# Patient Record
Sex: FEMALE | Race: WHITE | NOT HISPANIC OR LATINO | Employment: OTHER | URBAN - METROPOLITAN AREA
[De-identification: names, ages, dates, MRNs, and addresses within clinical notes are randomized per-mention and may not be internally consistent; named-entity substitution may affect disease eponyms.]

---

## 2017-08-20 RX ORDER — GABAPENTIN 300 MG/1
CAPSULE ORAL
Qty: 270 CAPSULE | Refills: 0 | Status: SHIPPED | OUTPATIENT
Start: 2017-08-20 | End: 2017-11-07

## 2017-08-20 RX ORDER — POTASSIUM CHLORIDE 600 MG/1
CAPSULE, EXTENDED RELEASE ORAL
Qty: 90 CAPSULE | Refills: 0 | Status: SHIPPED | OUTPATIENT
Start: 2017-08-20 | End: 2017-11-18 | Stop reason: SDUPTHER

## 2017-08-23 RX ORDER — CYCLOBENZAPRINE HCL 10 MG
TABLET ORAL
Qty: 30 TABLET | Refills: 1 | Status: SHIPPED | OUTPATIENT
Start: 2017-08-23 | End: 2018-12-17 | Stop reason: ALTCHOICE

## 2017-08-29 RX ORDER — MONTELUKAST SODIUM 10 MG/1
TABLET ORAL
Qty: 90 TABLET | Refills: 1 | Status: SHIPPED | OUTPATIENT
Start: 2017-08-29 | End: 2017-11-07

## 2017-08-29 RX ORDER — FUROSEMIDE 20 MG/1
TABLET ORAL
Qty: 90 TABLET | Refills: 1 | Status: SHIPPED | OUTPATIENT
Start: 2017-08-29 | End: 2018-02-25 | Stop reason: SDUPTHER

## 2017-08-29 RX ORDER — AMLODIPINE AND BENAZEPRIL HYDROCHLORIDE 5; 20 MG/1; MG/1
CAPSULE ORAL
Qty: 90 CAPSULE | Refills: 1 | Status: SHIPPED | OUTPATIENT
Start: 2017-08-29 | End: 2017-11-07 | Stop reason: CLARIF

## 2017-08-31 ENCOUNTER — TELEPHONE (OUTPATIENT)
Dept: PRIMARY CARE CLINIC | Facility: CLINIC | Age: 67
End: 2017-08-31

## 2017-08-31 ENCOUNTER — NURSE TRIAGE (OUTPATIENT)
Dept: ADMINISTRATIVE | Facility: CLINIC | Age: 67
End: 2017-08-31

## 2017-08-31 NOTE — TELEPHONE ENCOUNTER
Reason for Disposition   Caller has already spoken to PCP or another triager   Caller has already spoken with the PCP and has no further questions.    Protocols used: ST INFORMATION ONLY CALL-A-AH, ST NO CONTACT OR DUPLICATE CONTACT CALL-A-AH    Pt states an hour ago she almost passed out, feels dizzy, headache and funny filling to one side of face. States she already spoke to PCP office who told her to go to ER. Pt en route to ER.

## 2017-08-31 NOTE — TELEPHONE ENCOUNTER
----- Message from Karina Lyons sent at 8/31/2017 10:47 AM CDT -----  Mother stated patient is experiencing strange feeling on right side of face and head/almost passed out/gave call to screening nurse (Jackie)

## 2017-09-12 ENCOUNTER — OFFICE VISIT (OUTPATIENT)
Dept: PRIMARY CARE CLINIC | Facility: CLINIC | Age: 67
End: 2017-09-12
Payer: MEDICARE

## 2017-09-12 VITALS
OXYGEN SATURATION: 98 % | DIASTOLIC BLOOD PRESSURE: 67 MMHG | HEART RATE: 69 BPM | SYSTOLIC BLOOD PRESSURE: 97 MMHG | HEIGHT: 71 IN | RESPIRATION RATE: 18 BRPM | BODY MASS INDEX: 34.44 KG/M2 | WEIGHT: 246 LBS | TEMPERATURE: 98 F

## 2017-09-12 DIAGNOSIS — G45.9 TRANSIENT CEREBRAL ISCHEMIA, UNSPECIFIED TYPE: Primary | ICD-10-CM

## 2017-09-12 DIAGNOSIS — I65.29 STENOSIS OF CAROTID ARTERY, UNSPECIFIED LATERALITY: ICD-10-CM

## 2017-09-12 DIAGNOSIS — I10 ESSENTIAL HYPERTENSION, BENIGN: ICD-10-CM

## 2017-09-12 DIAGNOSIS — E78.5 HYPERLIPIDEMIA, UNSPECIFIED HYPERLIPIDEMIA TYPE: ICD-10-CM

## 2017-09-12 DIAGNOSIS — Z23 NEED FOR VACCINATION: ICD-10-CM

## 2017-09-12 PROCEDURE — 99214 OFFICE O/P EST MOD 30 MIN: CPT | Mod: S$GLB,,, | Performed by: FAMILY MEDICINE

## 2017-09-12 RX ORDER — ATORVASTATIN CALCIUM 20 MG/1
1 TABLET, FILM COATED ORAL DAILY
COMMUNITY
Start: 2017-08-14

## 2017-09-12 RX ORDER — PROMETHAZINE HYDROCHLORIDE 25 MG/1
25 TABLET ORAL EVERY 6 HOURS PRN
COMMUNITY
End: 2017-11-07

## 2017-09-12 RX ORDER — TRAZODONE HYDROCHLORIDE 100 MG/1
1 TABLET ORAL NIGHTLY
COMMUNITY
Start: 2017-06-11 | End: 2017-10-02 | Stop reason: SDUPTHER

## 2017-09-12 RX ORDER — LORATADINE 10 MG/1
1 TABLET ORAL DAILY PRN
COMMUNITY
Start: 2017-07-26 | End: 2017-11-07

## 2017-09-12 RX ORDER — SERTRALINE HYDROCHLORIDE 100 MG/1
1 TABLET, FILM COATED ORAL DAILY
COMMUNITY
Start: 2017-07-09 | End: 2018-04-05 | Stop reason: SDUPTHER

## 2017-09-12 RX ORDER — ALBUTEROL SULFATE 90 UG/1
1 AEROSOL, METERED RESPIRATORY (INHALATION) 2 TIMES DAILY
COMMUNITY
Start: 2017-07-17 | End: 2018-09-26

## 2017-09-12 RX ORDER — ASPIRIN 81 MG/1
81 TABLET ORAL DAILY
COMMUNITY
End: 2017-11-07

## 2017-09-12 RX ORDER — PANTOPRAZOLE SODIUM 40 MG/1
1 TABLET, DELAYED RELEASE ORAL DAILY
COMMUNITY
Start: 2017-07-20 | End: 2017-11-07

## 2017-09-12 NOTE — PROGRESS NOTES
Subjective:       Patient ID: Sveta العراقي is a 67 y.o. female.    Chief Complaint: Follow-up (pt is here for hospital f/u- pt was told from the ER she had a mini stroke but when admitted she was told something different )    Admitted to [facility] ~2 weeks ago with sudden onset right sided HA and facial numbness. Symptoms resolved within 24h. No acute abnormalities on CT or MRI. CTA of head and neck showed 40% carotid stenosis on right. Only med adjustment was addition of low-dose ASA. Since discharge, pt has been feeling fine. No recurrent symptoms.      Review of Systems   Constitutional: Negative for fever.   Eyes: Negative for visual disturbance.   Respiratory: Negative for shortness of breath.    Cardiovascular: Negative for chest pain.   Gastrointestinal: Negative for nausea and vomiting.   Genitourinary: Negative for difficulty urinating.   Neurological: Negative for dizziness, facial asymmetry, speech difficulty, weakness and light-headedness.       Objective:       Vitals:    09/12/17 1448   BP: 97/67   Pulse: 69   Resp: 18   Temp: 98.3 °F (36.8 °C)       Physical Exam   Constitutional: She is oriented to person, place, and time. She appears well-developed and well-nourished.   HENT:   Head: Normocephalic and atraumatic.   Cardiovascular: Normal rate, regular rhythm and normal heart sounds.    Pulmonary/Chest: Effort normal and breath sounds normal.   Musculoskeletal: She exhibits no edema.   Neurological: She is alert and oriented to person, place, and time. No cranial nerve deficit.   Skin: Skin is warm and dry.   Vitals reviewed.      Assessment:       1. Transient cerebral ischemia, unspecified type    2. Stenosis of carotid artery, unspecified laterality    3. Essential hypertension, benign    4. Hyperlipidemia, unspecified hyperlipidemia type    5. Need for vaccination        Plan:       Transient cerebral ischemia, unspecified type  Comments:  continue ASA, statin, BP meds    Stenosis of carotid  artery, unspecified laterality  -     CBC auto differential; Future; Expected date: 12/12/2017    Essential hypertension, benign  -     CBC auto differential; Future; Expected date: 12/12/2017    Hyperlipidemia, unspecified hyperlipidemia type  -     Comprehensive metabolic panel; Future; Expected date: 12/12/2017  -     Lipid panel; Future; Expected date: 12/12/2017    Need for vaccination  Comments:  pt declined flu shot

## 2017-10-02 RX ORDER — TRAZODONE HYDROCHLORIDE 100 MG/1
TABLET ORAL
Qty: 90 TABLET | Refills: 1 | Status: SHIPPED | OUTPATIENT
Start: 2017-10-02 | End: 2018-05-30 | Stop reason: SDUPTHER

## 2017-11-08 PROBLEM — I48.0 PAROXYSMAL ATRIAL FIBRILLATION: Status: ACTIVE | Noted: 2017-11-08

## 2017-11-20 RX ORDER — POTASSIUM CHLORIDE 600 MG/1
CAPSULE, EXTENDED RELEASE ORAL
Qty: 90 CAPSULE | Refills: 0 | Status: SHIPPED | OUTPATIENT
Start: 2017-11-20 | End: 2018-07-09 | Stop reason: SDUPTHER

## 2017-12-08 ENCOUNTER — TELEPHONE (OUTPATIENT)
Dept: PRIMARY CARE CLINIC | Facility: CLINIC | Age: 67
End: 2017-12-08

## 2017-12-08 NOTE — TELEPHONE ENCOUNTER
----- Message from Chavez Saleem sent at 12/8/2017  9:08 AM CST -----  Contact: patient  Patient called requesting to reschedule appointment,I offered appointment patient stated too far.please call back at [phone]. Thanks,

## 2017-12-11 ENCOUNTER — CLINICAL SUPPORT (OUTPATIENT)
Dept: PRIMARY CARE CLINIC | Facility: CLINIC | Age: 67
End: 2017-12-11
Payer: MEDICARE

## 2017-12-11 DIAGNOSIS — I65.29 STENOSIS OF CAROTID ARTERY, UNSPECIFIED LATERALITY: ICD-10-CM

## 2017-12-11 DIAGNOSIS — E66.3 OVER WEIGHT: ICD-10-CM

## 2017-12-11 DIAGNOSIS — R73.9 HYPERGLYCEMIA: ICD-10-CM

## 2017-12-11 DIAGNOSIS — E78.2 MIXED HYPERLIPIDEMIA: ICD-10-CM

## 2017-12-11 DIAGNOSIS — E78.5 HYPERLIPIDEMIA, UNSPECIFIED HYPERLIPIDEMIA TYPE: Primary | ICD-10-CM

## 2017-12-11 DIAGNOSIS — I10 ESSENTIAL HYPERTENSION, BENIGN: ICD-10-CM

## 2017-12-11 LAB
ALBUMIN SERPL BCP-MCNC: 3.7 G/DL
ALP SERPL-CCNC: 76 U/L
ALT SERPL W/O P-5'-P-CCNC: 10 U/L
ANION GAP SERPL CALC-SCNC: 4 MMOL/L
AST SERPL-CCNC: 18 U/L
BASOPHILS # BLD AUTO: 0.04 K/UL
BASOPHILS NFR BLD: 0.6 %
BILIRUB SERPL-MCNC: 0.4 MG/DL
BUN SERPL-MCNC: 27 MG/DL
CALCIUM SERPL-MCNC: 9.9 MG/DL
CHLORIDE SERPL-SCNC: 80 MMOL/L
CHOLEST SERPL-MCNC: 166 MG/DL
CHOLEST/HDLC SERPL: 2.6 {RATIO}
CO2 SERPL-SCNC: 25 MMOL/L
CREAT SERPL-MCNC: 1.2 MG/DL
DIFFERENTIAL METHOD: ABNORMAL
EOSINOPHIL # BLD AUTO: 0.3 K/UL
EOSINOPHIL NFR BLD: 3.8 %
ERYTHROCYTE [DISTWIDTH] IN BLOOD BY AUTOMATED COUNT: 12.1 %
EST. GFR  (AFRICAN AMERICAN): 54.8 ML/MIN/1.73 M^2
EST. GFR  (NON AFRICAN AMERICAN): 47.5 ML/MIN/1.73 M^2
GLUCOSE SERPL-MCNC: 99 MG/DL
HCT VFR BLD AUTO: 35.7 %
HDLC SERPL-MCNC: 65 MG/DL
HDLC SERPL: 39.2 %
HGB BLD-MCNC: 11.9 G/DL
IMM GRANULOCYTES # BLD AUTO: 0.02 K/UL
IMM GRANULOCYTES NFR BLD AUTO: 0.3 %
LDLC SERPL CALC-MCNC: 83.2 MG/DL
LYMPHOCYTES # BLD AUTO: 2.1 K/UL
LYMPHOCYTES NFR BLD: 32.3 %
MCH RBC QN AUTO: 31.2 PG
MCHC RBC AUTO-ENTMCNC: 33.3 G/DL
MCV RBC AUTO: 94 FL
MONOCYTES # BLD AUTO: 0.3 K/UL
MONOCYTES NFR BLD: 4.9 %
NEUTROPHILS # BLD AUTO: 3.8 K/UL
NEUTROPHILS NFR BLD: 58.1 %
NONHDLC SERPL-MCNC: 101 MG/DL
NRBC BLD-RTO: 0 /100 WBC
PLATELET # BLD AUTO: 238 K/UL
PMV BLD AUTO: 10.9 FL
POTASSIUM SERPL-SCNC: 3.8 MMOL/L
PROT SERPL-MCNC: 7.8 G/DL
RBC # BLD AUTO: 3.81 M/UL
SODIUM SERPL-SCNC: 109 MMOL/L
TRIGL SERPL-MCNC: 89 MG/DL
TSH SERPL DL<=0.005 MIU/L-ACNC: 2.31 UIU/ML
WBC # BLD AUTO: 6.5 K/UL

## 2017-12-11 PROCEDURE — 80053 COMPREHEN METABOLIC PANEL: CPT

## 2017-12-11 PROCEDURE — 85025 COMPLETE CBC W/AUTO DIFF WBC: CPT

## 2017-12-11 PROCEDURE — 84443 ASSAY THYROID STIM HORMONE: CPT

## 2017-12-11 PROCEDURE — 99999 PR PBB SHADOW E&M-EST. PATIENT-LVL II: CPT | Mod: PBBFAC,,,

## 2017-12-11 PROCEDURE — 83036 HEMOGLOBIN GLYCOSYLATED A1C: CPT

## 2017-12-11 PROCEDURE — 80061 LIPID PANEL: CPT

## 2017-12-11 PROCEDURE — 99212 OFFICE O/P EST SF 10 MIN: CPT | Mod: PBBFAC,PN

## 2017-12-12 ENCOUNTER — OFFICE VISIT (OUTPATIENT)
Dept: PRIMARY CARE CLINIC | Facility: CLINIC | Age: 67
End: 2017-12-12
Payer: MEDICARE

## 2017-12-12 ENCOUNTER — TELEPHONE (OUTPATIENT)
Dept: PRIMARY CARE CLINIC | Facility: CLINIC | Age: 67
End: 2017-12-12

## 2017-12-12 VITALS
BODY MASS INDEX: 35.99 KG/M2 | OXYGEN SATURATION: 99 % | HEIGHT: 69 IN | TEMPERATURE: 98 F | SYSTOLIC BLOOD PRESSURE: 111 MMHG | DIASTOLIC BLOOD PRESSURE: 75 MMHG | HEART RATE: 68 BPM | WEIGHT: 243 LBS | RESPIRATION RATE: 18 BRPM

## 2017-12-12 DIAGNOSIS — E87.1 HYPONATREMIA: Primary | ICD-10-CM

## 2017-12-12 DIAGNOSIS — I48.0 PAROXYSMAL ATRIAL FIBRILLATION: ICD-10-CM

## 2017-12-12 DIAGNOSIS — J45.909 ASTHMA, UNSPECIFIED ASTHMA SEVERITY, UNSPECIFIED WHETHER COMPLICATED, UNSPECIFIED WHETHER PERSISTENT: ICD-10-CM

## 2017-12-12 DIAGNOSIS — R00.2 PALPITATIONS: ICD-10-CM

## 2017-12-12 DIAGNOSIS — R06.09 EXERTIONAL DYSPNEA: ICD-10-CM

## 2017-12-12 PROBLEM — M50.30 DDD (DEGENERATIVE DISC DISEASE), CERVICAL: Status: ACTIVE | Noted: 2017-12-12

## 2017-12-12 PROBLEM — K21.9 GASTROESOPHAGEAL REFLUX DISEASE WITHOUT ESOPHAGITIS: Status: ACTIVE | Noted: 2017-12-12

## 2017-12-12 PROBLEM — M51.36 DDD (DEGENERATIVE DISC DISEASE), LUMBAR: Status: ACTIVE | Noted: 2017-12-12

## 2017-12-12 PROBLEM — M51.369 DDD (DEGENERATIVE DISC DISEASE), LUMBAR: Status: ACTIVE | Noted: 2017-12-12

## 2017-12-12 LAB
ESTIMATED AVG GLUCOSE: 100 MG/DL
HBA1C MFR BLD HPLC: 5.1 %

## 2017-12-12 PROCEDURE — 99213 OFFICE O/P EST LOW 20 MIN: CPT | Mod: PBBFAC,PN | Performed by: FAMILY MEDICINE

## 2017-12-12 PROCEDURE — 99214 OFFICE O/P EST MOD 30 MIN: CPT | Mod: S$PBB,,, | Performed by: FAMILY MEDICINE

## 2017-12-12 PROCEDURE — 99999 PR PBB SHADOW E&M-EST. PATIENT-LVL III: CPT | Mod: PBBFAC,,, | Performed by: FAMILY MEDICINE

## 2017-12-12 RX ORDER — TRAMADOL HYDROCHLORIDE 50 MG/1
50 TABLET ORAL EVERY 8 HOURS PRN
Qty: 30 TABLET | Refills: 1 | Status: SHIPPED | OUTPATIENT
Start: 2017-12-12 | End: 2018-04-23 | Stop reason: SDUPTHER

## 2017-12-12 RX ORDER — FLUTICASONE PROPIONATE AND SALMETEROL 250; 50 UG/1; UG/1
1 POWDER RESPIRATORY (INHALATION) 2 TIMES DAILY
Qty: 3 EACH | Refills: 3 | Status: SHIPPED | OUTPATIENT
Start: 2017-12-12 | End: 2018-09-26

## 2017-12-12 RX ORDER — MONTELUKAST SODIUM 10 MG/1
10 TABLET ORAL NIGHTLY
COMMUNITY
Start: 2017-11-27 | End: 2018-02-25 | Stop reason: SDUPTHER

## 2017-12-12 NOTE — TELEPHONE ENCOUNTER
----- Message from Nidia Self sent at 12/12/2017  2:16 PM CST -----  Contact: Patient  Sveta, patient [phone], Calling because she saw you this morning, forgot to ask for pain medication for her back. Please advise. Thanks.        [id number] [address] [address]  Phone: [phone] Fax: [phone]

## 2017-12-12 NOTE — PROGRESS NOTES
2 patient identifiers used , ( name &  )order checked , EKG performed as ordered ,results given to provider.

## 2017-12-12 NOTE — PROGRESS NOTES
"Subjective:       Patient ID: Sveta العراقي is a 67 y.o. female.    Chief Complaint: Follow-up (Pt. had a TIA x 2 weeks ago. Pt. was admitteed to [facility]. ); Shortness of Breath (Pt. c/o SOB with talking and activity. Heart racing, denies chest pain. Pt. has an ICM and has had to push her button for the monitor. ); and Neck Pain (Pt. c/o upper neck pain and lower back pain. No known injury. )    Admitted to [facility] for 24h on 11/27 with a TIA. Started feeling "weak and woozy" while out shopping with her daughter. Had MRI/MRA of head and neck, told no stroke, symptoms resolved within 24h. Since discharge, has been getting PT and OT at home, still feels like right leg is weak at times.  Has implanted LOOP recorder placed by Dr. Massey. Has been getting SoB with activity, feels like heart "pounding" with any type of activity, for the past 7-10 days.  Routine labs done yesterday showed profound hyponatremia/hypochloremia. Drinks 5-6 bottles of water per day.       Review of Systems   Constitutional: Negative for fever.   HENT: Negative for trouble swallowing.    Eyes: Negative for visual disturbance.   Respiratory: Positive for shortness of breath. Negative for cough.    Cardiovascular: Positive for palpitations. Negative for chest pain.   Gastrointestinal: Negative for nausea and vomiting.   Endocrine: Positive for polydipsia.   Genitourinary: Negative for difficulty urinating.   Skin: Negative for rash.   Neurological: Negative for dizziness, seizures and weakness.   Hematological: Does not bruise/bleed easily.       Objective:      Vitals:    12/12/17 1046   BP: 111/75   BP Location: Left arm   Patient Position: Sitting   BP Method: Large (Automatic)   Pulse: 68   Resp: 18   Temp: 97.6 °F (36.4 °C)   TempSrc: Oral   SpO2: 99%   Weight: 110.2 kg (243 lb)   Height: 5' 9" (1.753 m)     Lab Results   Component Value Date    WBC 6.50 12/11/2017    HGB 11.9 (L) 12/11/2017    HCT 35.7 (L) 12/11/2017     12/11/2017    " CHOL 166 12/11/2017    TRIG 89 12/11/2017    HDL 65 12/11/2017    ALT 10 12/11/2017    AST 18 12/11/2017     12/12/2017    K 4.3 12/12/2017     12/12/2017    CREATININE 1.2 (H) 12/12/2017    BUN 27 (H) 12/11/2017    CO2 24 12/12/2017    TSH 2.310 12/11/2017    HGBA1C 5.1 12/11/2017     Physical Exam   Constitutional: She is oriented to person, place, and time. She appears well-developed and well-nourished.   HENT:   Head: Normocephalic and atraumatic.   Neck: Neck supple. No JVD present.   Cardiovascular: Normal rate, regular rhythm and normal heart sounds.    Pulmonary/Chest: Effort normal and breath sounds normal.   Musculoskeletal: She exhibits no edema.   Neurological: She is alert and oriented to person, place, and time.   5/5 strength LLE, 4/5 RLE   Skin: Skin is warm and dry.   Psychiatric: She has a normal mood and affect.   Nursing note and vitals reviewed.      Assessment:       1. Hyponatremia    2. Asthma, unspecified asthma severity, unspecified whether complicated, unspecified whether persistent    3. Paroxysmal atrial fibrillation    4. Palpitations    5. Exertional dyspnea        Plan:       Hyponatremia  Comments:  Repeat sodium level normal.  Suspect lab error on yesterday's lab  Orders:  -     Basic metabolic panel; Future; Expected date: 12/12/2017    Asthma, unspecified asthma severity, unspecified whether complicated, unspecified whether persistent  -     fluticasone-salmeterol 250-50 mcg/dose (ADVAIR DISKUS) 250-50 mcg/dose diskus inhaler; Inhale 1 puff into the lungs 2 (two) times daily. Controller  Dispense: 3 each; Refill: 3  -     NEBULIZER FOR HOME USE  -     NEBULIZER KIT (SUPPLIES) FOR HOME USE    Paroxysmal atrial fibrillation  Comments:  Normal sinus rhythm on EKG today  Orders:  -     POCT EKG 12-LEAD (NOT FOR [facility] USE)    Palpitations  -     POCT EKG 12-LEAD (NOT FOR [facility] USE)    Exertional dyspnea  Comments:  Follow-up with cardiology as scheduled next  week  Orders:  -     POCT EKG 12-LEAD (NOT FOR [facility] USE)      Medication List with Changes/Refills   Current Medications    ALBUTEROL (ACCUNEB) 1.25 MG/3 ML NEBU    Take 1.25 mg by nebulization every 8 (eight) hours as needed. Rescue    AMLODIPINE-BENAZEPRIL 5-20 MG (LOTREL) 5-20 MG PER CAPSULE    Take 1 capsule by mouth every evening.    ATORVASTATIN (LIPITOR) 20 MG TABLET    Take 1 tablet by mouth once daily.    CLOPIDOGREL (PLAVIX) 75 MG TABLET    Take 75 mg by mouth once daily.    CYCLOBENZAPRINE (FLEXERIL) 10 MG TABLET    TAKE ONE TABLET BY MOUTH THREE TIMES A DAY    FUROSEMIDE (LASIX) 20 MG TABLET    TAKE 1 TABLET ONCE DAILY    METOPROLOL SUCCINATE (TOPROL-XL) 25 MG 24 HR TABLET    Take 25 mg by mouth once daily.    MONTELUKAST (SINGULAIR) 10 MG TABLET    Take 10 mg by mouth once daily.    POTASSIUM CHLORIDE (MICRO-K) 8 MEQ CPSR    TAKE 1 CAPSULE ONCE DAILY    PROAIR HFA 90 MCG/ACTUATION INHALER    1 puff 2 (two) times daily.    PROMETHAZINE (PHENERGAN) 25 MG TABLET    Take 25 mg by mouth daily as needed for Nausea.    RIVAROXABAN (XARELTO) 20 MG TAB    Take 20 mg by mouth once daily.    SERTRALINE (ZOLOFT) 100 MG TABLET    Take 1 tablet by mouth once daily.    TRAZODONE (DESYREL) 100 MG TABLET    TAKE 1 TABLET AT BEDTIME ONCE A DAY   Changed and/or Refilled Medications    Modified Medication Previous Medication    FLUTICASONE-SALMETEROL 250-50 MCG/DOSE (ADVAIR DISKUS) 250-50 MCG/DOSE DISKUS INHALER fluticasone-salmeterol 250-50 mcg/dose (ADVAIR DISKUS) 250-50 mcg/dose diskus inhaler       Inhale 1 puff into the lungs 2 (two) times daily. Controller    Inhale 1 puff into the lungs 2 (two) times daily. Controller

## 2017-12-12 NOTE — TELEPHONE ENCOUNTER
Patient forgot to ask for pain medication for her back at her appointment wants to know if you would send her in  Some to the pharmacy Please advise.

## 2017-12-13 NOTE — TELEPHONE ENCOUNTER
----- Message from Kaylyn Malik sent at 12/12/2017  3:10 PM CST -----  Contact: [facility] called regarding an order for the patient a nebulizer. Checked eligibility, not eligible, received one in 2014. Please contact [phone]

## 2017-12-20 ENCOUNTER — TELEPHONE (OUTPATIENT)
Dept: PRIMARY CARE CLINIC | Facility: CLINIC | Age: 67
End: 2017-12-20

## 2017-12-20 NOTE — TELEPHONE ENCOUNTER
Pt notified that medicare guidelines stat that the pt can not have another neb machine for 5 years. Pt states understanding. She says she still had her old one but sometimes she has to bang on it for it to work

## 2017-12-20 NOTE — TELEPHONE ENCOUNTER
----- Message from Eusebiodrake Stephanie sent at 12/19/2017 10:32 AM CST -----  Contact: Patient  Patient stated that if she didn't hear anything about receiving a Nebulizer to call the doctor back.  She has not gotten one from the supply company.  Can you please call her back at [phone].  Thank you

## 2018-01-26 PROBLEM — R07.9 CHEST PAIN: Status: ACTIVE | Noted: 2018-01-26

## 2018-01-26 PROBLEM — R06.02 SOB (SHORTNESS OF BREATH): Status: ACTIVE | Noted: 2018-01-26

## 2018-02-25 RX ORDER — FUROSEMIDE 20 MG/1
TABLET ORAL
Qty: 90 TABLET | Refills: 1 | Status: ON HOLD | OUTPATIENT
Start: 2018-02-25 | End: 2018-10-30 | Stop reason: HOSPADM

## 2018-02-25 RX ORDER — MONTELUKAST SODIUM 10 MG/1
TABLET ORAL
Qty: 90 TABLET | Refills: 1 | Status: SHIPPED | OUTPATIENT
Start: 2018-02-25 | End: 2019-01-14 | Stop reason: SDUPTHER

## 2018-03-14 ENCOUNTER — TELEPHONE (OUTPATIENT)
Dept: PRIMARY CARE CLINIC | Facility: CLINIC | Age: 68
End: 2018-03-14

## 2018-03-14 NOTE — TELEPHONE ENCOUNTER
----- Message from Nidia Self sent at 3/13/2018  3:27 PM CDT -----  Contact: Patient  Sveta, patient [phone], Calling to ask if it is alright for her to take Promethazine DM cough syrup and Flonase with the medication she is on. Please call her. Thanks.

## 2018-03-19 ENCOUNTER — OFFICE VISIT (OUTPATIENT)
Dept: PRIMARY CARE CLINIC | Facility: CLINIC | Age: 68
End: 2018-03-19
Payer: MEDICARE

## 2018-03-19 VITALS
DIASTOLIC BLOOD PRESSURE: 63 MMHG | TEMPERATURE: 98 F | HEART RATE: 73 BPM | WEIGHT: 243.13 LBS | RESPIRATION RATE: 18 BRPM | HEIGHT: 71 IN | SYSTOLIC BLOOD PRESSURE: 95 MMHG | OXYGEN SATURATION: 98 % | BODY MASS INDEX: 34.04 KG/M2

## 2018-03-19 DIAGNOSIS — J20.9 ACUTE BRONCHITIS, UNSPECIFIED ORGANISM: Primary | ICD-10-CM

## 2018-03-19 DIAGNOSIS — R07.9 CHEST PAIN, UNSPECIFIED TYPE: ICD-10-CM

## 2018-03-19 PROCEDURE — 99213 OFFICE O/P EST LOW 20 MIN: CPT | Mod: PBBFAC,PN | Performed by: FAMILY MEDICINE

## 2018-03-19 PROCEDURE — 99214 OFFICE O/P EST MOD 30 MIN: CPT | Mod: S$PBB,,, | Performed by: FAMILY MEDICINE

## 2018-03-19 PROCEDURE — 99999 PR PBB SHADOW E&M-EST. PATIENT-LVL III: CPT | Mod: PBBFAC,,, | Performed by: FAMILY MEDICINE

## 2018-03-19 RX ORDER — METHYLPREDNISOLONE 4 MG/1
TABLET ORAL
Qty: 1 PACKAGE | Refills: 0 | Status: SHIPPED | OUTPATIENT
Start: 2018-03-19 | End: 2018-04-23

## 2018-03-19 RX ORDER — AZITHROMYCIN 250 MG/1
TABLET, FILM COATED ORAL
Qty: 6 TABLET | Refills: 0 | Status: SHIPPED | OUTPATIENT
Start: 2018-03-19 | End: 2018-03-23

## 2018-03-19 NOTE — PROGRESS NOTES
"Subjective:       Patient ID: Sveta العراقي is a 67 y.o. female.    Chief Complaint: Cough (congestion x 1 1/2 weeks) and Chest Pain ( when coughing)    Worsening chest congestion, SoB, and nonproductive cough for the past ~10 days. Feeling weak, no appetite.      Review of Systems   Constitutional: Positive for chills. Negative for fever.   HENT: Negative for trouble swallowing.    Eyes: Negative for visual disturbance.   Respiratory: Positive for cough and shortness of breath.    Cardiovascular: Positive for chest pain.   Gastrointestinal: Positive for nausea and vomiting.   Genitourinary: Negative for difficulty urinating.   Musculoskeletal: Negative for joint swelling.   Skin: Negative for rash.   Neurological: Positive for weakness.   Psychiatric/Behavioral: Negative for agitation and confusion.       Objective:      Vitals:    03/19/18 1400   BP: 95/63   BP Location: Left arm   Patient Position: Sitting   BP Method: Medium (Automatic)   Pulse: 73   Resp: 18   Temp: 98.1 °F (36.7 °C)   TempSrc: Oral   SpO2: 98%   Weight: 110.3 kg (243 lb 1.6 oz)   Height: 5' 11" (1.803 m)     Lab Results   Component Value Date    WBC 7.10 03/19/2018    HGB 12.5 03/19/2018    HCT 36.4 (L) 03/19/2018     03/19/2018    CHOL 166 12/11/2017    TRIG 89 12/11/2017    HDL 65 12/11/2017    ALT 12 (L) 03/19/2018    AST 20 03/19/2018     03/19/2018    K 4.5 03/19/2018     03/19/2018    CREATININE 1.3 (H) 03/19/2018    BUN 30 (H) 03/19/2018    CO2 24 03/19/2018    TSH 2.310 12/11/2017    INR 1.3 (H) 01/29/2018    HGBA1C 5.1 12/11/2017     Physical Exam   Constitutional: She is oriented to person, place, and time. She appears well-developed and well-nourished.   HENT:   Head: Normocephalic and atraumatic.   Neck: Neck supple. No JVD present.   Cardiovascular: Normal rate, regular rhythm and normal heart sounds.    Pulmonary/Chest: Effort normal. No respiratory distress. She has wheezes. She has rhonchi. "   Musculoskeletal: She exhibits no edema.   Neurological: She is alert and oriented to person, place, and time.   Skin: Skin is warm and dry.   Psychiatric: She has a normal mood and affect. Her behavior is normal.   Nursing note and vitals reviewed.      Assessment:       1. Acute bronchitis, unspecified organism    2. Chest pain, unspecified type        Plan:       Acute bronchitis, unspecified organism  Comments:  CXR clear. Advised to go to ER for any new or worsening symptoms. Continue albuterol PRN  Orders:  -     X-Ray Chest PA And Lateral; Future; Expected date: 03/19/2018  -     azithromycin (Z-ACE) 250 MG tablet; 2 tabs by mouth day 1, then 1 tab by mouth daily x 4 days  Dispense: 6 tablet; Refill: 0  -     methylPREDNISolone (MEDROL DOSEPACK) 4 mg tablet; use as directed  Dispense: 1 Package; Refill: 0    Chest pain, unspecified type  Comments:  EKG normal, cardiac enzymes negative  Orders:  -     POCT EKG 12-LEAD (NOT FOR [facility] USE)  -     CBC auto differential; Future; Expected date: 03/19/2018  -     Comprehensive metabolic panel; Future; Expected date: 03/19/2018  -     Troponin I; Future; Expected date: 03/19/2018  -     CK-MB; Future; Expected date: 03/19/2018      Medication List with Changes/Refills   New Medications    AZITHROMYCIN (Z-ACE) 250 MG TABLET    2 tabs by mouth day 1, then 1 tab by mouth daily x 4 days    METHYLPREDNISOLONE (MEDROL DOSEPACK) 4 MG TABLET    use as directed   Current Medications    ALBUTEROL (ACCUNEB) 1.25 MG/3 ML NEBU    Take 1.25 mg by nebulization every 8 (eight) hours as needed. Rescue    AMLODIPINE-BENAZEPRIL 5-20 MG (LOTREL) 5-20 MG PER CAPSULE    Take 1 capsule by mouth every evening.    ATORVASTATIN (LIPITOR) 20 MG TABLET    Take 1 tablet by mouth once daily.    CLOPIDOGREL (PLAVIX) 75 MG TABLET    Take 75 mg by mouth once daily.    CYCLOBENZAPRINE (FLEXERIL) 10 MG TABLET    TAKE ONE TABLET BY MOUTH THREE TIMES A DAY    FLUTICASONE-SALMETEROL 250-50 MCG/DOSE  (ADVAIR DISKUS) 250-50 MCG/DOSE DISKUS INHALER    Inhale 1 puff into the lungs 2 (two) times daily. Controller    FUROSEMIDE (LASIX) 20 MG TABLET    TAKE 1 TABLET ONCE DAILY    METOPROLOL SUCCINATE (TOPROL-XL) 25 MG 24 HR TABLET    Take 25 mg by mouth once daily.    MONTELUKAST (SINGULAIR) 10 MG TABLET    TAKE 1 TABLET ONCE DAILY IN THE EVENING    POTASSIUM CHLORIDE (MICRO-K) 8 MEQ CPSR    TAKE 1 CAPSULE ONCE DAILY    PROAIR HFA 90 MCG/ACTUATION INHALER    1 puff 2 (two) times daily.    PROMETHAZINE (PHENERGAN) 25 MG TABLET    Take 25 mg by mouth daily as needed for Nausea.    RIVAROXABAN (XARELTO) 20 MG TAB    Take 20 mg by mouth once daily.    SERTRALINE (ZOLOFT) 100 MG TABLET    Take 1 tablet by mouth once daily.    TRAMADOL (ULTRAM) 50 MG TABLET    Take 1 tablet (50 mg total) by mouth every 8 (eight) hours as needed for Pain.    TRAZODONE (DESYREL) 100 MG TABLET    TAKE 1 TABLET AT BEDTIME ONCE A DAY

## 2018-04-05 RX ORDER — SERTRALINE HYDROCHLORIDE 100 MG/1
TABLET, FILM COATED ORAL
Qty: 90 TABLET | Refills: 3 | Status: SHIPPED | OUTPATIENT
Start: 2018-04-05 | End: 2019-05-08 | Stop reason: SDUPTHER

## 2018-04-23 ENCOUNTER — OFFICE VISIT (OUTPATIENT)
Dept: PRIMARY CARE CLINIC | Facility: CLINIC | Age: 68
End: 2018-04-23
Payer: MEDICARE

## 2018-04-23 VITALS
DIASTOLIC BLOOD PRESSURE: 70 MMHG | RESPIRATION RATE: 18 BRPM | BODY MASS INDEX: 35 KG/M2 | SYSTOLIC BLOOD PRESSURE: 104 MMHG | OXYGEN SATURATION: 98 % | HEIGHT: 71 IN | WEIGHT: 250 LBS | HEART RATE: 64 BPM | TEMPERATURE: 98 F

## 2018-04-23 DIAGNOSIS — M51.36 DDD (DEGENERATIVE DISC DISEASE), LUMBAR: ICD-10-CM

## 2018-04-23 DIAGNOSIS — L23.7 CONTACT DERMATITIS DUE TO POISON OAK: Primary | ICD-10-CM

## 2018-04-23 PROCEDURE — 99999 PR PBB SHADOW E&M-EST. PATIENT-LVL III: CPT | Mod: PBBFAC,,, | Performed by: FAMILY MEDICINE

## 2018-04-23 PROCEDURE — 96372 THER/PROPH/DIAG INJ SC/IM: CPT | Mod: PBBFAC,PN

## 2018-04-23 PROCEDURE — 99213 OFFICE O/P EST LOW 20 MIN: CPT | Mod: PBBFAC,PN | Performed by: FAMILY MEDICINE

## 2018-04-23 PROCEDURE — 99213 OFFICE O/P EST LOW 20 MIN: CPT | Mod: S$PBB,,, | Performed by: FAMILY MEDICINE

## 2018-04-23 RX ORDER — BETAMETHASONE SODIUM PHOSPHATE AND BETAMETHASONE ACETATE 3; 3 MG/ML; MG/ML
12 INJECTION, SUSPENSION INTRA-ARTICULAR; INTRALESIONAL; INTRAMUSCULAR; SOFT TISSUE
Status: COMPLETED | OUTPATIENT
Start: 2018-04-23 | End: 2018-04-23

## 2018-04-23 RX ORDER — TRAMADOL HYDROCHLORIDE 50 MG/1
50 TABLET ORAL EVERY 8 HOURS PRN
Qty: 30 TABLET | Refills: 2 | Status: SHIPPED | OUTPATIENT
Start: 2018-04-23 | End: 2018-07-12

## 2018-04-23 RX ORDER — METHYLPREDNISOLONE 4 MG/1
TABLET ORAL
Qty: 1 PACKAGE | Refills: 0 | Status: SHIPPED | OUTPATIENT
Start: 2018-04-23 | End: 2018-05-30

## 2018-04-23 RX ADMIN — BETAMETHASONE SODIUM PHOSPHATE AND BETAMETHASONE ACETATE 12 MG: 3; 3 INJECTION, SUSPENSION INTRA-ARTICULAR; INTRALESIONAL; INTRAMUSCULAR at 10:04

## 2018-04-23 NOTE — PROGRESS NOTES
"Subjective:       Patient ID: Sveta العراقي is a 67 y.o. female.    Chief Complaint: Poison Ivy    Poison Ivy   This is a new problem. The current episode started in the past 7 days. The problem has been gradually worsening since onset. The affected locations include the face, left arm, right arm, right foot and left foot. The rash is characterized by itchiness, redness and blistering. She was exposed to plant contact. Pertinent negatives include no facial edema, fever, joint pain, shortness of breath, sore throat or vomiting. Past treatments include anti-itch cream. The treatment provided no relief.     Review of Systems   Constitutional: Negative for fever.   HENT: Negative for sore throat and trouble swallowing.    Respiratory: Negative for shortness of breath.    Cardiovascular: Negative for chest pain.   Gastrointestinal: Negative for vomiting.   Musculoskeletal: Negative for joint pain.   Skin: Positive for rash.       Objective:      Vitals:    04/23/18 0957   BP: 104/70   BP Location: Left arm   Patient Position: Sitting   BP Method: Large (Automatic)   Pulse: 64   Resp: 18   Temp: 97.8 °F (36.6 °C)   TempSrc: Oral   SpO2: 98%   Weight: 113.4 kg (250 lb)   Height: 5' 11" (1.803 m)     Physical Exam   Constitutional: She is oriented to person, place, and time. She appears well-developed and well-nourished.   HENT:   Head: Normocephalic and atraumatic.   Cardiovascular: Normal rate, regular rhythm and normal heart sounds.    Pulmonary/Chest: Effort normal and breath sounds normal.   Musculoskeletal: She exhibits no edema.   Neurological: She is alert and oriented to person, place, and time.   Skin: Skin is warm and dry. Rash (erythematous vesicular rash in linear patterns on tops of feet, forearms, and underside of chin) noted.   Nursing note and vitals reviewed.      Assessment:       1. Contact dermatitis due to poison oak    2. DDD (degenerative disc disease), lumbar        Plan:       Contact dermatitis " due to poison oak  -     betamethasone acetate-betamethasone sodium phosphate injection 12 mg; Inject 2 mLs (12 mg total) into the muscle one time.  -     methylPREDNISolone (MEDROL DOSEPACK) 4 mg tablet; use as directed  Dispense: 1 Package; Refill: 0    DDD (degenerative disc disease), lumbar  -     traMADol (ULTRAM) 50 mg tablet; Take 1 tablet (50 mg total) by mouth every 8 (eight) hours as needed for Pain.  Dispense: 30 tablet; Refill: 2      Medication List with Changes/Refills   New Medications    METHYLPREDNISOLONE (MEDROL DOSEPACK) 4 MG TABLET    use as directed   Current Medications    ALBUTEROL (ACCUNEB) 1.25 MG/3 ML NEBU    Take 1.25 mg by nebulization every 8 (eight) hours as needed. Rescue    AMLODIPINE-BENAZEPRIL 5-20 MG (LOTREL) 5-20 MG PER CAPSULE    Take 1 capsule by mouth every evening.    ATORVASTATIN (LIPITOR) 20 MG TABLET    Take 1 tablet by mouth once daily.    CLOPIDOGREL (PLAVIX) 75 MG TABLET    Take 75 mg by mouth once daily.    CYCLOBENZAPRINE (FLEXERIL) 10 MG TABLET    TAKE ONE TABLET BY MOUTH THREE TIMES A DAY    FLUTICASONE-SALMETEROL 250-50 MCG/DOSE (ADVAIR DISKUS) 250-50 MCG/DOSE DISKUS INHALER    Inhale 1 puff into the lungs 2 (two) times daily. Controller    FUROSEMIDE (LASIX) 20 MG TABLET    TAKE 1 TABLET ONCE DAILY    METOPROLOL SUCCINATE (TOPROL-XL) 25 MG 24 HR TABLET    Take 25 mg by mouth once daily.    MONTELUKAST (SINGULAIR) 10 MG TABLET    TAKE 1 TABLET ONCE DAILY IN THE EVENING    POTASSIUM CHLORIDE (MICRO-K) 8 MEQ CPSR    TAKE 1 CAPSULE ONCE DAILY    PROAIR HFA 90 MCG/ACTUATION INHALER    1 puff 2 (two) times daily.    PROMETHAZINE (PHENERGAN) 25 MG TABLET    Take 25 mg by mouth daily as needed for Nausea.    RIVAROXABAN (XARELTO) 20 MG TAB    Take 20 mg by mouth once daily.    SERTRALINE (ZOLOFT) 100 MG TABLET    TAKE 1 TABLET DAILY    TRAZODONE (DESYREL) 100 MG TABLET    TAKE 1 TABLET AT BEDTIME ONCE A DAY   Changed and/or Refilled Medications    Modified Medication  Previous Medication    TRAMADOL (ULTRAM) 50 MG TABLET traMADol (ULTRAM) 50 mg tablet       Take 1 tablet (50 mg total) by mouth every 8 (eight) hours as needed for Pain.    Take 1 tablet (50 mg total) by mouth every 8 (eight) hours as needed for Pain.   Discontinued Medications    METHYLPREDNISOLONE (MEDROL DOSEPACK) 4 MG TABLET    use as directed

## 2018-05-30 ENCOUNTER — OFFICE VISIT (OUTPATIENT)
Dept: PRIMARY CARE CLINIC | Facility: CLINIC | Age: 68
End: 2018-05-30
Payer: MEDICARE

## 2018-05-30 VITALS
HEART RATE: 65 BPM | DIASTOLIC BLOOD PRESSURE: 66 MMHG | RESPIRATION RATE: 18 BRPM | WEIGHT: 246.5 LBS | HEIGHT: 71 IN | BODY MASS INDEX: 34.51 KG/M2 | TEMPERATURE: 99 F | OXYGEN SATURATION: 96 % | SYSTOLIC BLOOD PRESSURE: 99 MMHG

## 2018-05-30 DIAGNOSIS — F51.01 PRIMARY INSOMNIA: ICD-10-CM

## 2018-05-30 DIAGNOSIS — J20.9 ACUTE BRONCHITIS, UNSPECIFIED ORGANISM: Primary | ICD-10-CM

## 2018-05-30 PROCEDURE — 99214 OFFICE O/P EST MOD 30 MIN: CPT | Mod: S$PBB,,, | Performed by: FAMILY MEDICINE

## 2018-05-30 PROCEDURE — 99213 OFFICE O/P EST LOW 20 MIN: CPT | Mod: PBBFAC,PN | Performed by: FAMILY MEDICINE

## 2018-05-30 PROCEDURE — 99999 PR PBB SHADOW E&M-EST. PATIENT-LVL III: CPT | Mod: PBBFAC,,, | Performed by: FAMILY MEDICINE

## 2018-05-30 RX ORDER — DOXYCYCLINE 100 MG/1
100 CAPSULE ORAL EVERY 12 HOURS
Qty: 20 CAPSULE | Refills: 0 | Status: SHIPPED | OUTPATIENT
Start: 2018-05-30 | End: 2018-06-09

## 2018-05-30 RX ORDER — METHYLPREDNISOLONE 4 MG/1
TABLET ORAL
Qty: 1 PACKAGE | Refills: 0 | Status: SHIPPED | OUTPATIENT
Start: 2018-05-30 | End: 2018-07-12

## 2018-05-30 RX ORDER — TRAZODONE HYDROCHLORIDE 100 MG/1
TABLET ORAL
Qty: 90 TABLET | Refills: 1 | Status: SHIPPED | OUTPATIENT
Start: 2018-05-30 | End: 2018-12-11 | Stop reason: SDUPTHER

## 2018-05-30 NOTE — PROGRESS NOTES
"Subjective:       Patient ID: Sveta العراقي is a 68 y.o. female.    Chief Complaint: Sore Throat and Cough    Cough   This is a new problem. The current episode started 1 to 4 weeks ago. The problem has been gradually worsening. The problem occurs every few minutes. The cough is non-productive. Associated symptoms include chest pain, chills, a fever, a sore throat, shortness of breath, sweats and wheezing. Pertinent negatives include no hemoptysis, rash or weight loss. Risk factors for lung disease include travel. She has tried a beta-agonist inhaler for the symptoms. The treatment provided no relief. Her past medical history is significant for asthma and pneumonia.     Review of Systems   Constitutional: Positive for chills and fever. Negative for weight loss.   HENT: Positive for sore throat.    Eyes: Negative for visual disturbance.   Respiratory: Positive for cough, shortness of breath and wheezing. Negative for hemoptysis.    Cardiovascular: Positive for chest pain.   Gastrointestinal: Negative for vomiting.   Genitourinary: Negative for difficulty urinating.   Musculoskeletal: Negative for joint swelling.   Skin: Negative for rash.   Neurological: Negative for seizures and syncope.   Psychiatric/Behavioral: Negative for agitation and confusion.       Objective:      Vitals:    05/30/18 1421   BP: 99/66   BP Location: Right arm   Patient Position: Sitting   BP Method: Large (Automatic)   Pulse: 65   Resp: 18   Temp: 98.5 °F (36.9 °C)   TempSrc: Oral   SpO2: 96%   Weight: 111.8 kg (246 lb 8 oz)   Height: 5' 11" (1.803 m)     Physical Exam   Constitutional: She is oriented to person, place, and time. She appears well-developed and well-nourished.   HENT:   Head: Normocephalic and atraumatic.   Right Ear: Tympanic membrane normal.   Left Ear: Tympanic membrane normal.   Nose: Right sinus exhibits no maxillary sinus tenderness. Left sinus exhibits no maxillary sinus tenderness.   Mouth/Throat: Posterior " oropharyngeal erythema present. No oropharyngeal exudate.   Eyes: EOM are normal.   Neck: Neck supple. No JVD present.   Cardiovascular: Normal rate, regular rhythm and normal heart sounds.    Pulmonary/Chest: Effort normal. No respiratory distress. She has wheezes in the right middle field, the right lower field, the left middle field and the left lower field. She has rhonchi in the right middle field, the right lower field, the left middle field and the left lower field.   Musculoskeletal: She exhibits no edema.   Neurological: She is alert and oriented to person, place, and time.   Skin: Skin is warm and dry.   Nursing note and vitals reviewed.      Assessment:       1. Acute bronchitis, unspecified organism    2. Primary insomnia        Plan:       Acute bronchitis, unspecified organism  Comments:  Chest x-ray reviewed.  No definite infiltrate noted. Advised to go to the emergency room for any new or worsening symptoms. Use albuterol as needed  Orders:  -     X-Ray Chest PA And Lateral; Future; Expected date: 05/30/2018  -     doxycycline (VIBRAMYCIN) 100 MG Cap; Take 1 capsule (100 mg total) by mouth every 12 (twelve) hours.  Dispense: 20 capsule; Refill: 0  -     methylPREDNISolone (MEDROL DOSEPACK) 4 mg tablet; use as directed  Dispense: 1 Package; Refill: 0    Primary insomnia  -     traZODone (DESYREL) 100 MG tablet; TAKE 1 TABLET AT BEDTIME ONCE A DAY  Dispense: 90 tablet; Refill: 1      Medication List with Changes/Refills   New Medications    DOXYCYCLINE (VIBRAMYCIN) 100 MG CAP    Take 1 capsule (100 mg total) by mouth every 12 (twelve) hours.    METHYLPREDNISOLONE (MEDROL DOSEPACK) 4 MG TABLET    use as directed   Current Medications    ALBUTEROL (ACCUNEB) 1.25 MG/3 ML NEBU    Take 1.25 mg by nebulization every 8 (eight) hours as needed. Rescue    AMLODIPINE-BENAZEPRIL 5-20 MG (LOTREL) 5-20 MG PER CAPSULE    Take 1 capsule by mouth every evening.    ATORVASTATIN (LIPITOR) 20 MG TABLET    Take 1 tablet  by mouth once daily.    CLOPIDOGREL (PLAVIX) 75 MG TABLET    Take 75 mg by mouth once daily.    CYCLOBENZAPRINE (FLEXERIL) 10 MG TABLET    TAKE ONE TABLET BY MOUTH THREE TIMES A DAY    FLUTICASONE-SALMETEROL 250-50 MCG/DOSE (ADVAIR DISKUS) 250-50 MCG/DOSE DISKUS INHALER    Inhale 1 puff into the lungs 2 (two) times daily. Controller    FUROSEMIDE (LASIX) 20 MG TABLET    TAKE 1 TABLET ONCE DAILY    METOPROLOL SUCCINATE (TOPROL-XL) 25 MG 24 HR TABLET    Take 25 mg by mouth once daily.    MONTELUKAST (SINGULAIR) 10 MG TABLET    TAKE 1 TABLET ONCE DAILY IN THE EVENING    POTASSIUM CHLORIDE (MICRO-K) 8 MEQ CPSR    TAKE 1 CAPSULE ONCE DAILY    PROAIR HFA 90 MCG/ACTUATION INHALER    1 puff 2 (two) times daily.    PROMETHAZINE (PHENERGAN) 25 MG TABLET    Take 25 mg by mouth daily as needed for Nausea.    RIVAROXABAN (XARELTO) 20 MG TAB    Take 20 mg by mouth once daily.    SERTRALINE (ZOLOFT) 100 MG TABLET    TAKE 1 TABLET DAILY    TRAMADOL (ULTRAM) 50 MG TABLET    Take 1 tablet (50 mg total) by mouth every 8 (eight) hours as needed for Pain.   Changed and/or Refilled Medications    Modified Medication Previous Medication    TRAZODONE (DESYREL) 100 MG TABLET trazodone (DESYREL) 100 MG tablet       TAKE 1 TABLET AT BEDTIME ONCE A DAY    TAKE 1 TABLET AT BEDTIME ONCE A DAY   Discontinued Medications    METHYLPREDNISOLONE (MEDROL DOSEPACK) 4 MG TABLET    use as directed

## 2018-06-01 ENCOUNTER — TELEPHONE (OUTPATIENT)
Dept: PRIMARY CARE CLINIC | Facility: CLINIC | Age: 68
End: 2018-06-01

## 2018-06-01 RX ORDER — PROMETHAZINE HYDROCHLORIDE AND DEXTROMETHORPHAN HYDROBROMIDE 6.25; 15 MG/5ML; MG/5ML
5 SYRUP ORAL EVERY 6 HOURS PRN
Qty: 180 ML | Refills: 1 | Status: SHIPPED | OUTPATIENT
Start: 2018-06-01 | End: 2018-07-12

## 2018-06-01 NOTE — TELEPHONE ENCOUNTER
----- Message from Michelle Dorsey sent at 6/1/2018  4:57 PM CDT -----  Contact: pt  Pt would like to be called back regarding a RX promephadine PHILLIP ruby has a bad cough    Pt can be reached at [phone]

## 2018-07-09 RX ORDER — POTASSIUM CHLORIDE 600 MG/1
CAPSULE, EXTENDED RELEASE ORAL
Qty: 90 CAPSULE | Refills: 0 | Status: SHIPPED | OUTPATIENT
Start: 2018-07-09 | End: 2018-10-16 | Stop reason: SDUPTHER

## 2018-07-12 ENCOUNTER — OFFICE VISIT (OUTPATIENT)
Dept: PRIMARY CARE CLINIC | Facility: CLINIC | Age: 68
End: 2018-07-12
Payer: MEDICARE

## 2018-07-12 VITALS
SYSTOLIC BLOOD PRESSURE: 103 MMHG | TEMPERATURE: 98 F | HEIGHT: 71 IN | WEIGHT: 251 LBS | BODY MASS INDEX: 35.14 KG/M2 | DIASTOLIC BLOOD PRESSURE: 68 MMHG | OXYGEN SATURATION: 96 % | RESPIRATION RATE: 19 BRPM | HEART RATE: 65 BPM

## 2018-07-12 DIAGNOSIS — E78.5 HYPERLIPIDEMIA, UNSPECIFIED HYPERLIPIDEMIA TYPE: ICD-10-CM

## 2018-07-12 DIAGNOSIS — M46.1 SACROILIITIS: ICD-10-CM

## 2018-07-12 DIAGNOSIS — I48.0 PAROXYSMAL ATRIAL FIBRILLATION: ICD-10-CM

## 2018-07-12 DIAGNOSIS — F43.10 POSTTRAUMATIC STRESS DISORDER: ICD-10-CM

## 2018-07-12 DIAGNOSIS — J45.909 ASTHMA, UNSPECIFIED ASTHMA SEVERITY, UNSPECIFIED WHETHER COMPLICATED, UNSPECIFIED WHETHER PERSISTENT: ICD-10-CM

## 2018-07-12 DIAGNOSIS — S39.012A LUMBOSACRAL STRAIN, INITIAL ENCOUNTER: Primary | ICD-10-CM

## 2018-07-12 DIAGNOSIS — E87.6 HYPOKALEMIA: ICD-10-CM

## 2018-07-12 DIAGNOSIS — M54.30 SCIATICA, UNSPECIFIED LATERALITY: ICD-10-CM

## 2018-07-12 DIAGNOSIS — M51.36 DDD (DEGENERATIVE DISC DISEASE), LUMBAR: ICD-10-CM

## 2018-07-12 DIAGNOSIS — I10 ESSENTIAL HYPERTENSION, BENIGN: ICD-10-CM

## 2018-07-12 DIAGNOSIS — K21.9 GASTROESOPHAGEAL REFLUX DISEASE WITHOUT ESOPHAGITIS: ICD-10-CM

## 2018-07-12 DIAGNOSIS — G45.9 TRANSIENT CEREBRAL ISCHEMIA, UNSPECIFIED TYPE: ICD-10-CM

## 2018-07-12 DIAGNOSIS — M50.30 DDD (DEGENERATIVE DISC DISEASE), CERVICAL: ICD-10-CM

## 2018-07-12 DIAGNOSIS — R51.9 NONINTRACTABLE HEADACHE, UNSPECIFIED CHRONICITY PATTERN, UNSPECIFIED HEADACHE TYPE: ICD-10-CM

## 2018-07-12 PROCEDURE — 99999 PR PBB SHADOW E&M-EST. PATIENT-LVL V: CPT | Mod: PBBFAC,,, | Performed by: FAMILY MEDICINE

## 2018-07-12 PROCEDURE — 99215 OFFICE O/P EST HI 40 MIN: CPT | Mod: PBBFAC,PN,25 | Performed by: FAMILY MEDICINE

## 2018-07-12 PROCEDURE — 99213 OFFICE O/P EST LOW 20 MIN: CPT | Mod: S$PBB,,, | Performed by: FAMILY MEDICINE

## 2018-07-12 PROCEDURE — 96372 THER/PROPH/DIAG INJ SC/IM: CPT | Mod: PBBFAC,PN

## 2018-07-12 RX ORDER — HYDROCODONE BITARTRATE AND ACETAMINOPHEN 5; 325 MG/1; MG/1
1 TABLET ORAL EVERY 6 HOURS PRN
Qty: 30 TABLET | Refills: 0 | Status: SHIPPED | OUTPATIENT
Start: 2018-07-12 | End: 2018-08-08 | Stop reason: SDUPTHER

## 2018-07-12 RX ORDER — PREDNISONE 5 MG/1
TABLET ORAL
Qty: 20 TABLET | Refills: 0 | Status: SHIPPED | OUTPATIENT
Start: 2018-07-12 | End: 2018-08-08

## 2018-07-12 RX ORDER — BUTALBITAL, ACETAMINOPHEN AND CAFFEINE 50; 325; 40 MG/1; MG/1; MG/1
1 TABLET ORAL EVERY 4 HOURS PRN
Qty: 30 TABLET | Refills: 0 | Status: SHIPPED | OUTPATIENT
Start: 2018-07-12 | End: 2018-08-11

## 2018-07-12 RX ORDER — BETAMETHASONE SODIUM PHOSPHATE AND BETAMETHASONE ACETATE 3; 3 MG/ML; MG/ML
12 INJECTION, SUSPENSION INTRA-ARTICULAR; INTRALESIONAL; INTRAMUSCULAR; SOFT TISSUE
Status: COMPLETED | OUTPATIENT
Start: 2018-07-12 | End: 2018-07-12

## 2018-07-12 RX ORDER — BETAMETHASONE SODIUM PHOSPHATE AND BETAMETHASONE ACETATE 3; 3 MG/ML; MG/ML
12 INJECTION, SUSPENSION INTRA-ARTICULAR; INTRALESIONAL; INTRAMUSCULAR; SOFT TISSUE
Status: DISCONTINUED | OUTPATIENT
Start: 2018-07-12 | End: 2018-07-12

## 2018-07-12 RX ORDER — GABAPENTIN 300 MG/1
300 CAPSULE ORAL 3 TIMES DAILY
Qty: 90 CAPSULE | Refills: 11 | Status: SHIPPED | OUTPATIENT
Start: 2018-07-12 | End: 2018-07-13 | Stop reason: SDUPTHER

## 2018-07-12 RX ADMIN — BETAMETHASONE SODIUM PHOSPHATE AND BETAMETHASONE ACETATE 12 MG: 3; 3 INJECTION, SUSPENSION INTRA-ARTICULAR; INTRALESIONAL; INTRAMUSCULAR at 04:07

## 2018-07-12 NOTE — PROGRESS NOTES
Subjective:       Patient ID: Sveta العراقي is a 68 y.o. female.    Chief Complaint: Hospital Follow Up (K+ high )    HPI:  68-year-old female patient with the [facility] Emergency Room---07/01/2018---told had possible TIA or mini stroke.  Patient was sent to [facility] and was admitted here kept x5 day--patient was getting severe pain in the left side of the neck in the entire head was hurting.  Head MRI  Brain, , CT scan of the brain.  An MRI of the neck.  Told has a slipped disc.  Patient was told when she was discharged did not have a stroke had a slipped disc during the hospitalization potassium high was given Kayexalate.       Patient states now-- unable to walk or stand--due to back pain. Started 2 days ago--pain with sitting, bending, standing going up and down steps.  Told in the past that she had 3 herniated disc in her back.  History of DDD cervical spine, history of DDD lumbar spine, no lupus rheumatoid gout.  No fracture    ROS:  Skin: no psoriasis, eczema, skin cancer  HEENT: + Yakelin headache--headache from the slip disc---, ocular pain, blurred vision, diplopia, epistaxis, hoarseness change in voice, thyroid trouble  Lung: No pneumonia, +asthma,no  Tb, wheezing, SOB, has not had to use inhalers in a while  Heart: No chest pain, ankle edema, palpitations, MI, josselin murmur,+ hypertension,+hyperlipidemia +atrial fib patient has a loop recorder has a history of carotid stenosis and a history of a TIA  Abdomen:  History of GERD doing fair No nausea, vomiting, diarrhea, constipation, ulcers, hepatitis, gallbladder disease, melena, hematochezia, hematemesis  : no UTI, renal disease, stones told had a damage kidney and creatinine kept going up  GYN hysterectomy usually gets a mammogram every year miss 1 so far this year  MS: no fractures, O/A, lupus, rheumatoid, gout-see history of present illness neck with headache and back pain  Neuro: No dizziness, LOC, seizures   No diabetes, no  anemia, + anxiety, + depression --patient states found perfect combination trazodone and Zoloft so stayed on that     of lung cancer 2012, 3 children, son and daughter-in-law and 2 grandchildren live with patient, disabled since  due to posttraumatic stress disorder and  a bad back      Objective:   Physical Exam:  General: Well nourished, well developed, no acute distress +obese  Skin: No lesions  HEENT: Eyes PERRLA, EOM intact, nose patent, throat non-erythematous years to clear  NECK: Supple, no bruits, No JVD, no nodes  Lungs: Clear, no rales, rhonchi, wheezing-decreased breath sounds  Heart: Regular rate and rhythm, no murmurs, gallops, or rubs  Abdomen: flat, bowel sounds positive, no tenderness, or organomegaly  MS:  Tenderness in the lumbar spine L1-S1 bilaterally and tenderness in the right sacroiliac area severe pain as it tends to rise from the sitting position if attempts to walk especially with weight-bearing on the right leg--patient is status post bilateral knee replacements right hip replacement history a DDD cervical and lumbar spine  Neuro: Alert, CN intact, oriented X 3  Extremities: No cyanosis, clubbing, or edema         Assessment:       1. Lumbosacral strain, initial encounter    2. Sacroiliitis    3. DDD (degenerative disc disease), lumbar    4. DDD (degenerative disc disease), cervical    5. Transient cerebral ischemia, unspecified type    6. Asthma, unspecified asthma severity, unspecified whether complicated, unspecified whether persistent    7. Essential hypertension, benign    8. Hyperlipidemia, unspecified hyperlipidemia type    9. Paroxysmal atrial fibrillation    10. Gastroesophageal reflux disease without esophagitis    11. Nonintractable headache, unspecified chronicity pattern, unspecified headache type    12. Posttraumatic stress disorder        Plan:       Lumbosacral strain, initial encounter  -     X-Ray Lumbar Spine Complete 5 View; Future;  Expected date: 07/12/2018    Sacroiliitis  -     X-Ray Hip 2 or 3 views Right; Future; Expected date: 07/12/2018    DDD (degenerative disc disease), lumbar    DDD (degenerative disc disease), cervical    Transient cerebral ischemia, unspecified type    Asthma, unspecified asthma severity, unspecified whether complicated, unspecified whether persistent    Essential hypertension, benign    Hyperlipidemia, unspecified hyperlipidemia type    Paroxysmal atrial fibrillation    Gastroesophageal reflux disease without esophagitis    Nonintractable headache, unspecified chronicity pattern, unspecified headache type    Posttraumatic stress disorder      Moist heat, Theragesic, range-of-motion exercise  Celestone last shot 04/23/2018 the patient is unable to take NSAIDs--Norco/prednisone tapering dose no steroids for 3 months/continue Flexeril  X-ray lumbar spine and right hip  Due to hyperkalemia CBC CMP lipid and magnesium U/A   See Dr. Colin in 3 weeks if not better get MRI of the lumbar spine  Patient needs to see Dr. hsieh for intractable headache neck pain with DDD back pain with DDD right leg with sciatica  Fioricet for headache--Norco prednisone room Flexeril no NSAIDs to be on Plavix for neck and back pain--gabapentin for sciatica 300 b.i.d. we increase to t.i.d. if needed

## 2018-07-13 NOTE — TELEPHONE ENCOUNTER
----- Message from Alverto Walters sent at 7/13/2018 11:22 AM CDT -----  Contact: same  Patient called in and stated her Rx for gabapentin (NEURONTIN) 300 MG capsule was sent to the incorrect pharmacy and needs to be resent to the following:      [facility] - [place], [place] - [address] [place] [id number]  Phone: [phone] Fax: [phone]    Patient call back number is [phone]    Also, patient wanted to check the status of the x-ray she had done yesterday 7/12/18 at [facility].

## 2018-07-15 RX ORDER — GABAPENTIN 300 MG/1
300 CAPSULE ORAL 3 TIMES DAILY
Qty: 90 CAPSULE | Refills: 2 | Status: SHIPPED | OUTPATIENT
Start: 2018-07-15 | End: 2018-10-16 | Stop reason: SDUPTHER

## 2018-07-16 NOTE — TELEPHONE ENCOUNTER
Call tell ptroutinely gabapentin txed like control medications and we do not call these in over the phone INeeds get these refills with office visit only Will give 3 mo refills this time will not refiull over the phone in the future

## 2018-07-18 ENCOUNTER — TELEPHONE (OUTPATIENT)
Dept: PRIMARY CARE CLINIC | Facility: CLINIC | Age: 68
End: 2018-07-18

## 2018-07-18 DIAGNOSIS — M19.90 OSTEOARTHRITIS, UNSPECIFIED OSTEOARTHRITIS TYPE, UNSPECIFIED SITE: Primary | ICD-10-CM

## 2018-07-18 DIAGNOSIS — M89.9 DISORDER OF BONE: ICD-10-CM

## 2018-07-18 DIAGNOSIS — M85.80 OSTEOPENIA, UNSPECIFIED LOCATION: ICD-10-CM

## 2018-07-18 DIAGNOSIS — M51.36 DDD (DEGENERATIVE DISC DISEASE), LUMBAR: ICD-10-CM

## 2018-07-18 NOTE — TELEPHONE ENCOUNTER
----- Message from Nidia Self sent at 7/18/2018  2:28 PM CDT -----  Contact: Patient  Type:  Patient Returning Call    Who Called:  frannie Bangura  Who Left Message for Patient:  ?  Does the patient know what this is regarding?:  ?  Best Call Back Number:  [phone] or [phone]  Additional Information:  Missed your call, tomasa call her back. Thanks.

## 2018-08-08 ENCOUNTER — OFFICE VISIT (OUTPATIENT)
Dept: PRIMARY CARE CLINIC | Facility: CLINIC | Age: 68
End: 2018-08-08
Payer: MEDICARE

## 2018-08-08 ENCOUNTER — CLINICAL SUPPORT (OUTPATIENT)
Dept: PRIMARY CARE CLINIC | Facility: CLINIC | Age: 68
End: 2018-08-08
Payer: MEDICARE

## 2018-08-08 VITALS
WEIGHT: 255.63 LBS | SYSTOLIC BLOOD PRESSURE: 117 MMHG | HEART RATE: 67 BPM | RESPIRATION RATE: 18 BRPM | BODY MASS INDEX: 35.79 KG/M2 | OXYGEN SATURATION: 95 % | HEIGHT: 71 IN | TEMPERATURE: 98 F | DIASTOLIC BLOOD PRESSURE: 71 MMHG

## 2018-08-08 DIAGNOSIS — Z23 NEED FOR VACCINATION: ICD-10-CM

## 2018-08-08 DIAGNOSIS — E78.5 HYPERLIPIDEMIA, UNSPECIFIED HYPERLIPIDEMIA TYPE: ICD-10-CM

## 2018-08-08 DIAGNOSIS — E87.6 HYPOKALEMIA: ICD-10-CM

## 2018-08-08 DIAGNOSIS — Z11.59 NEED FOR HEPATITIS C SCREENING TEST: ICD-10-CM

## 2018-08-08 DIAGNOSIS — Z12.31 ENCOUNTER FOR SCREENING MAMMOGRAM FOR BREAST CANCER: ICD-10-CM

## 2018-08-08 DIAGNOSIS — M50.30 DDD (DEGENERATIVE DISC DISEASE), CERVICAL: ICD-10-CM

## 2018-08-08 DIAGNOSIS — M89.9 DISORDER OF BONE: ICD-10-CM

## 2018-08-08 DIAGNOSIS — M51.36 DDD (DEGENERATIVE DISC DISEASE), LUMBAR: Primary | ICD-10-CM

## 2018-08-08 DIAGNOSIS — Z12.11 COLON CANCER SCREENING: ICD-10-CM

## 2018-08-08 PROBLEM — R06.02 SOB (SHORTNESS OF BREATH): Status: RESOLVED | Noted: 2018-01-26 | Resolved: 2018-08-08

## 2018-08-08 PROBLEM — R07.9 CHEST PAIN: Status: RESOLVED | Noted: 2018-01-26 | Resolved: 2018-08-08

## 2018-08-08 LAB
ALBUMIN SERPL BCP-MCNC: 4.1 G/DL
ALP SERPL-CCNC: 58 U/L
ALT SERPL W/O P-5'-P-CCNC: 19 U/L
ANION GAP SERPL CALC-SCNC: 9 MMOL/L
AST SERPL-CCNC: 28 U/L
BASOPHILS # BLD AUTO: 0 K/UL
BASOPHILS NFR BLD: 0.7 %
BILIRUB SERPL-MCNC: 0.6 MG/DL
BUN SERPL-MCNC: 31 MG/DL
CALCIUM SERPL-MCNC: 9.2 MG/DL
CHLORIDE SERPL-SCNC: 102 MMOL/L
CHOLEST SERPL-MCNC: 173 MG/DL
CHOLEST/HDLC SERPL: 1.9 {RATIO}
CO2 SERPL-SCNC: 25 MMOL/L
CREAT SERPL-MCNC: 1.3 MG/DL
DIFFERENTIAL METHOD: ABNORMAL
EOSINOPHIL # BLD AUTO: 0.2 K/UL
EOSINOPHIL NFR BLD: 2.7 %
ERYTHROCYTE [DISTWIDTH] IN BLOOD BY AUTOMATED COUNT: 13.4 %
EST. GFR  (AFRICAN AMERICAN): 49.4 ML/MIN/1.73 M^2
EST. GFR  (NON AFRICAN AMERICAN): 42.9 ML/MIN/1.73 M^2
GLUCOSE SERPL-MCNC: 85 MG/DL
HCT VFR BLD AUTO: 34.3 %
HDLC SERPL-MCNC: 89 MG/DL
HDLC SERPL: 51.4 %
HGB BLD-MCNC: 11.6 G/DL
LDLC SERPL CALC-MCNC: 69 MG/DL
LYMPHOCYTES # BLD AUTO: 1.8 K/UL
LYMPHOCYTES NFR BLD: 27.4 %
MAGNESIUM SERPL-MCNC: 1.8 MG/DL
MCH RBC QN AUTO: 31.4 PG
MCHC RBC AUTO-ENTMCNC: 33.8 G/DL
MCV RBC AUTO: 93 FL
MONOCYTES # BLD AUTO: 0.3 K/UL
MONOCYTES NFR BLD: 4.8 %
NEUTROPHILS # BLD AUTO: 4.1 K/UL
NEUTROPHILS NFR BLD: 64.4 %
NONHDLC SERPL-MCNC: 84 MG/DL
PLATELET # BLD AUTO: 231 K/UL
PMV BLD AUTO: 8.8 FL
POTASSIUM SERPL-SCNC: 4.1 MMOL/L
PROT SERPL-MCNC: 7.9 G/DL
RBC # BLD AUTO: 3.69 M/UL
SODIUM SERPL-SCNC: 136 MMOL/L
TRIGL SERPL-MCNC: 77 MG/DL
WBC # BLD AUTO: 6.4 K/UL

## 2018-08-08 PROCEDURE — 99214 OFFICE O/P EST MOD 30 MIN: CPT | Mod: S$PBB,,, | Performed by: FAMILY MEDICINE

## 2018-08-08 PROCEDURE — 99999 PR PBB SHADOW E&M-EST. PATIENT-LVL II: CPT | Mod: PBBFAC,,,

## 2018-08-08 PROCEDURE — 86803 HEPATITIS C AB TEST: CPT

## 2018-08-08 PROCEDURE — 99212 OFFICE O/P EST SF 10 MIN: CPT | Mod: PBBFAC,27,PN,25

## 2018-08-08 PROCEDURE — G0009 ADMIN PNEUMOCOCCAL VACCINE: HCPCS | Mod: PBBFAC,PN

## 2018-08-08 PROCEDURE — 99999 PR PBB SHADOW E&M-EST. PATIENT-LVL IV: CPT | Mod: PBBFAC,,, | Performed by: FAMILY MEDICINE

## 2018-08-08 PROCEDURE — 99214 OFFICE O/P EST MOD 30 MIN: CPT | Mod: PBBFAC,PN | Performed by: FAMILY MEDICINE

## 2018-08-08 RX ORDER — HYDROCODONE BITARTRATE AND ACETAMINOPHEN 5; 325 MG/1; MG/1
1 TABLET ORAL EVERY 6 HOURS PRN
Qty: 30 TABLET | Refills: 0 | Status: SHIPPED | OUTPATIENT
Start: 2018-08-08 | End: 2018-09-26

## 2018-08-08 NOTE — PROGRESS NOTES
Patient ID by name and . Allergies verified. Prevnar 13 vaccine given in left deltoid using aseptic technique. Aspirated with no blood noted. Patient tolerated well. Given per physicians order. No adverse reaction noted.

## 2018-08-08 NOTE — PROGRESS NOTES
"Subjective:       Patient ID: Sveta العراقي is a 68 y.o. female.    Chief Complaint: Back Pain and Follow-up ([facility] in July)    Patient was admitted to [facility] last month with severe headache and neck pain, apparently initial concern for TIA/stroke.  Ruled out for acute neurologic event, symptoms felt to be due to worsening cervical degenerative disc disease.  Hospital course complicated by hypokalemia, subsequently treated.  Due for updated labs.  However, since discharge, she has had progressively worsening lower back pain and gait difficulty as a result.  No falls, but says she has come close recently.  Legs frequently feel like they are going to give out on her, to the point that she is now ambulating with a cane.  In the past, has had epidural injections while living in [place] with little to no relief of her discomfort.  She is willing to consider surgical options, if that would be beneficial.      Review of Systems   Constitutional: Negative for fever.   Respiratory: Negative for shortness of breath.    Cardiovascular: Negative for chest pain.   Gastrointestinal: Negative for vomiting.   Musculoskeletal: Positive for back pain and gait problem.   Skin: Negative for wound.   Neurological: Negative for syncope.       Objective:      Vitals:    08/08/18 1417   BP: 117/71   BP Location: Right arm   Patient Position: Sitting   BP Method: Large (Automatic)   Pulse: 67   Resp: 18   Temp: 98.1 °F (36.7 °C)   TempSrc: Oral   SpO2: 95%   Weight: 115.9 kg (255 lb 9.6 oz)   Height: 5' 11" (1.803 m)     Physical Exam   Constitutional: She is oriented to person, place, and time. She appears well-developed and well-nourished.   HENT:   Head: Normocephalic and atraumatic.   Cardiovascular: Normal rate, regular rhythm and normal heart sounds.    Pulmonary/Chest: Effort normal and breath sounds normal.   Musculoskeletal: She exhibits no edema.        Lumbar back: She exhibits decreased range of " motion and tenderness. She exhibits no deformity.   Neurological: She is alert and oriented to person, place, and time. She has normal strength.   Reflex Scores:       Patellar reflexes are 2+ on the right side and 2+ on the left side.  Skin: Skin is warm and dry.   Psychiatric: Her speech is normal and behavior is normal. She exhibits a depressed mood.   Nursing note and vitals reviewed.      Assessment:       1. DDD (degenerative disc disease), lumbar    2. Need for hepatitis C screening test    3. Encounter for screening mammogram for breast cancer    4. Need for vaccination    5. Colon cancer screening    6. Hyperlipidemia, unspecified hyperlipidemia type    7. DDD (degenerative disc disease), cervical    8. Hypokalemia    9. Disorder of bone        Plan:       DDD (degenerative disc disease), lumbar  Comments:  pt willing to consider surgery if necessary  Orders:  -     MRI Lumbar Spine Without Contrast; Future; Expected date: 08/08/2018  -     HYDROcodone-acetaminophen (NORCO) 5-325 mg per tablet; Take 1 tablet by mouth every 6 (six) hours as needed for Pain.  Dispense: 30 tablet; Refill: 0    Need for hepatitis C screening test  -     Hepatitis C antibody; Future; Expected date: 08/08/2018    Encounter for screening mammogram for breast cancer  -     Mammo Digital Screening Bilat without CA; Future; Expected date: 08/22/2018    Need for vaccination  -     Pneumococcal Conjugate Vaccine (13 Valent) (IM)    Colon cancer screening    Hyperlipidemia, unspecified hyperlipidemia type  -     CBC auto differential; Future; Expected date: 08/08/2018  -     Comprehensive metabolic panel; Future; Expected date: 08/08/2018  -     Lipid panel; Future; Expected date: 08/08/2018    DDD (degenerative disc disease), cervical    Hypokalemia  -     Magnesium; Future; Expected date: 08/08/2018    Disorder of bone  -     DXA Bone Density Spine And Hip; Future; Expected date: 08/15/2018      Medication List with Changes/Refills    Current Medications    ALBUTEROL (ACCUNEB) 1.25 MG/3 ML NEBU    Take 1.25 mg by nebulization every 8 (eight) hours as needed. Rescue    AMLODIPINE (NORVASC) 5 MG TABLET    Take 5 mg by mouth once daily.    ATORVASTATIN (LIPITOR) 20 MG TABLET    Take 1 tablet by mouth once daily.    BUTALBITAL-ACETAMINOPHEN-CAFFEINE -40 MG (FIORICET, ESGIC) -40 MG PER TABLET    Take 1 tablet by mouth every 4 (four) hours as needed.    CLOPIDOGREL (PLAVIX) 75 MG TABLET    Take 75 mg by mouth once daily.    CYCLOBENZAPRINE (FLEXERIL) 10 MG TABLET    TAKE ONE TABLET BY MOUTH THREE TIMES A DAY    FLUTICASONE-SALMETEROL 250-50 MCG/DOSE (ADVAIR DISKUS) 250-50 MCG/DOSE DISKUS INHALER    Inhale 1 puff into the lungs 2 (two) times daily. Controller    FUROSEMIDE (LASIX) 20 MG TABLET    TAKE 1 TABLET ONCE DAILY    GABAPENTIN (NEURONTIN) 300 MG CAPSULE    Take 1 capsule (300 mg total) by mouth 3 (three) times daily.    LISINOPRIL (PRINIVIL,ZESTRIL) 20 MG TABLET    Take 20 mg by mouth once daily.    METOPROLOL SUCCINATE (TOPROL-XL) 25 MG 24 HR TABLET    Take 25 mg by mouth once daily.    MONTELUKAST (SINGULAIR) 10 MG TABLET    TAKE 1 TABLET ONCE DAILY IN THE EVENING    POTASSIUM CHLORIDE (MICRO-K) 8 MEQ CPSR    TAKE 1 CAPSULE DAILY    PROAIR HFA 90 MCG/ACTUATION INHALER    1 puff 2 (two) times daily.    PROMETHAZINE (PHENERGAN) 25 MG TABLET    Take 25 mg by mouth daily as needed for Nausea.    RIVAROXABAN (XARELTO) 20 MG TAB    Take 20 mg by mouth once daily.    SERTRALINE (ZOLOFT) 100 MG TABLET    TAKE 1 TABLET DAILY    TRAZODONE (DESYREL) 100 MG TABLET    TAKE 1 TABLET AT BEDTIME ONCE A DAY   Changed and/or Refilled Medications    Modified Medication Previous Medication    HYDROCODONE-ACETAMINOPHEN (NORCO) 5-325 MG PER TABLET HYDROcodone-acetaminophen (NORCO) 5-325 mg per tablet       Take 1 tablet by mouth every 6 (six) hours as needed for Pain.    Take 1 tablet by mouth every 6 (six) hours as needed.   Discontinued Medications     PREDNISONE (DELTASONE) 5 MG TABLET    4 po qd x 2, 3 po qd x2, 2 po qd x2, 1 po qd x2

## 2018-08-09 LAB — HCV AB SERPL QL IA: NEGATIVE

## 2018-08-21 DIAGNOSIS — M51.36 DDD (DEGENERATIVE DISC DISEASE), LUMBAR: Primary | ICD-10-CM

## 2018-08-21 DIAGNOSIS — M85.80 OSTEOPENIA, UNSPECIFIED LOCATION: ICD-10-CM

## 2018-08-21 RX ORDER — LYSINE HCL 500 MG
1 TABLET ORAL 2 TIMES DAILY
Qty: 180 TABLET | Refills: 3 | Status: SHIPPED | OUTPATIENT
Start: 2018-08-21

## 2018-08-21 RX ORDER — ALENDRONATE SODIUM 35 MG/1
35 TABLET ORAL
Qty: 12 TABLET | Refills: 3 | Status: SHIPPED | OUTPATIENT
Start: 2018-08-21 | End: 2019-10-05 | Stop reason: SDUPTHER

## 2018-08-29 ENCOUNTER — TELEPHONE (OUTPATIENT)
Dept: PAIN MEDICINE | Facility: CLINIC | Age: 68
End: 2018-08-29

## 2018-08-29 NOTE — TELEPHONE ENCOUNTER
Called and spoke with patient regarding coming appointment. Informed patient of [facility] practice and treatment plans. Patient indicated understanding the information provided and would like to proceed with the scheduled appointment. No further issues discussed.

## 2018-09-05 ENCOUNTER — OFFICE VISIT (OUTPATIENT)
Dept: PRIMARY CARE CLINIC | Facility: CLINIC | Age: 68
End: 2018-09-05
Payer: MEDICARE

## 2018-09-05 VITALS
DIASTOLIC BLOOD PRESSURE: 66 MMHG | TEMPERATURE: 98 F | HEIGHT: 71 IN | BODY MASS INDEX: 35.56 KG/M2 | RESPIRATION RATE: 16 BRPM | HEART RATE: 67 BPM | OXYGEN SATURATION: 98 % | SYSTOLIC BLOOD PRESSURE: 102 MMHG | WEIGHT: 254 LBS

## 2018-09-05 DIAGNOSIS — E78.5 HYPERLIPIDEMIA, UNSPECIFIED HYPERLIPIDEMIA TYPE: ICD-10-CM

## 2018-09-05 DIAGNOSIS — Z23 NEED FOR VACCINATION: ICD-10-CM

## 2018-09-05 DIAGNOSIS — M51.36 DDD (DEGENERATIVE DISC DISEASE), LUMBAR: ICD-10-CM

## 2018-09-05 DIAGNOSIS — L01.00 IMPETIGO: Primary | ICD-10-CM

## 2018-09-05 DIAGNOSIS — I10 ESSENTIAL HYPERTENSION, BENIGN: ICD-10-CM

## 2018-09-05 DIAGNOSIS — M85.80 OSTEOPENIA, UNSPECIFIED LOCATION: ICD-10-CM

## 2018-09-05 PROCEDURE — 99999 PR PBB SHADOW E&M-EST. PATIENT-LVL III: CPT | Mod: PBBFAC,,, | Performed by: FAMILY MEDICINE

## 2018-09-05 PROCEDURE — 99213 OFFICE O/P EST LOW 20 MIN: CPT | Mod: PBBFAC,PN,25 | Performed by: FAMILY MEDICINE

## 2018-09-05 PROCEDURE — 90714 TD VACC NO PRESV 7 YRS+ IM: CPT | Mod: PBBFAC,PN

## 2018-09-05 PROCEDURE — 99213 OFFICE O/P EST LOW 20 MIN: CPT | Mod: S$PBB,,, | Performed by: FAMILY MEDICINE

## 2018-09-05 RX ORDER — MUPIROCIN 20 MG/G
OINTMENT TOPICAL 3 TIMES DAILY
Qty: 22 G | Refills: 1 | Status: SHIPPED | OUTPATIENT
Start: 2018-09-05 | End: 2018-12-28

## 2018-09-05 RX ORDER — DOXYCYCLINE 100 MG/1
100 CAPSULE ORAL EVERY 12 HOURS
Qty: 20 CAPSULE | Refills: 0 | Status: SHIPPED | OUTPATIENT
Start: 2018-09-05 | End: 2018-09-15

## 2018-09-05 RX ORDER — TRAMADOL HYDROCHLORIDE 50 MG/1
1 TABLET ORAL DAILY
COMMUNITY
Start: 2018-09-02 | End: 2018-10-18 | Stop reason: SDUPTHER

## 2018-09-05 NOTE — PROGRESS NOTES
"Subjective:       Patient ID: Sveta العراقي is a 68 y.o. female.    Chief Complaint: Results (patient is here to discuss MRI, Dexa, and Mammogram ) and Ear Drainage (patient has a sore behind her ear that is draining )     DEXA scan and MRI results reviewed with patient.    Complains of minimally tender, crusty lesion behind right ear for the past week or so, occasionally drains small amount of clear yellow fluid      Review of Systems   Constitutional: Negative for fever.   Respiratory: Negative for shortness of breath.    Cardiovascular: Negative for chest pain.   Musculoskeletal: Positive for arthralgias and back pain.       Objective:      Vitals:    09/05/18 1040   BP: 102/66   BP Location: Left arm   Patient Position: Sitting   BP Method: Large (Automatic)   Pulse: 67   Resp: 16   Temp: 98.1 °F (36.7 °C)   TempSrc: Oral   SpO2: 98%   Weight: 115.2 kg (254 lb)   Height: 5' 11" (1.803 m)     Physical Exam   Constitutional: She is oriented to person, place, and time. She appears well-developed and well-nourished.   HENT:   Head: Normocephalic and atraumatic.   Cardiovascular: Normal rate, regular rhythm and normal heart sounds.   Pulmonary/Chest: Effort normal and breath sounds normal.   Musculoskeletal: She exhibits no edema.   Neurological: She is alert and oriented to person, place, and time.   Skin: Skin is warm.   Erythematous, scaling, crusted lesion behind right ear with scant serous exudate   Nursing note and vitals reviewed.      Assessment:       1. Impetigo    2. DDD (degenerative disc disease), lumbar    3. Osteopenia, unspecified location    4. Hyperlipidemia, unspecified hyperlipidemia type    5. Essential hypertension, benign    6. Need for vaccination        Plan:       Impetigo  -     mupirocin (BACTROBAN) 2 % ointment; Apply topically 3 (three) times daily.  Dispense: 22 g; Refill: 1  -     doxycycline (VIBRAMYCIN) 100 MG Cap; Take 1 capsule (100 mg total) by mouth every 12 (twelve) hours. " for 10 days  Dispense: 20 capsule; Refill: 0   return to clinic for any new or worsening symptoms  DDD (degenerative disc disease), lumbar   follow-up with Dr. Riddle  Osteopenia, unspecified location   Teto low-dose alendronate, calcium and vitamin-D  Hyperlipidemia, unspecified hyperlipidemia type  -     Comprehensive metabolic panel; Future; Expected date: 03/05/2019  -     Lipid panel; Future; Expected date: 03/05/2019    Essential hypertension, benign  -     CBC auto differential; Future; Expected date: 03/05/2019    Need for vaccination  -     Td Vaccine (Adult) - Preservative Free      Medication List with Changes/Refills   New Medications    DOXYCYCLINE (VIBRAMYCIN) 100 MG CAP    Take 1 capsule (100 mg total) by mouth every 12 (twelve) hours. for 10 days    MUPIROCIN (BACTROBAN) 2 % OINTMENT    Apply topically 3 (three) times daily.   Current Medications    ALBUTEROL (ACCUNEB) 1.25 MG/3 ML NEBU    Take 1.25 mg by nebulization every 8 (eight) hours as needed. Rescue    ALENDRONATE (FOSAMAX) 35 MG TABLET    Take 1 tablet (35 mg total) by mouth every 7 days.    AMLODIPINE (NORVASC) 5 MG TABLET    Take 5 mg by mouth once daily.    ATORVASTATIN (LIPITOR) 20 MG TABLET    Take 1 tablet by mouth once daily.    CALCIUM CARBONATE-VIT D3- MG CALCIUM- 400 UNIT TAB    Take 1 tablet by mouth 2 (two) times daily.    CLOPIDOGREL (PLAVIX) 75 MG TABLET    Take 75 mg by mouth once daily.    CYCLOBENZAPRINE (FLEXERIL) 10 MG TABLET    TAKE ONE TABLET BY MOUTH THREE TIMES A DAY    FLUTICASONE-SALMETEROL 250-50 MCG/DOSE (ADVAIR DISKUS) 250-50 MCG/DOSE DISKUS INHALER    Inhale 1 puff into the lungs 2 (two) times daily. Controller    FUROSEMIDE (LASIX) 20 MG TABLET    TAKE 1 TABLET ONCE DAILY    GABAPENTIN (NEURONTIN) 300 MG CAPSULE    Take 1 capsule (300 mg total) by mouth 3 (three) times daily.    HYDROCODONE-ACETAMINOPHEN (NORCO) 5-325 MG PER TABLET    Take 1 tablet by mouth every 6 (six) hours as needed for Pain.     LISINOPRIL (PRINIVIL,ZESTRIL) 20 MG TABLET    Take 20 mg by mouth once daily.    METOPROLOL SUCCINATE (TOPROL-XL) 25 MG 24 HR TABLET    Take 25 mg by mouth once daily.    MONTELUKAST (SINGULAIR) 10 MG TABLET    TAKE 1 TABLET ONCE DAILY IN THE EVENING    POTASSIUM CHLORIDE (MICRO-K) 8 MEQ CPSR    TAKE 1 CAPSULE DAILY    PROAIR HFA 90 MCG/ACTUATION INHALER    1 puff 2 (two) times daily.    PROMETHAZINE (PHENERGAN) 25 MG TABLET    Take 25 mg by mouth daily as needed for Nausea.    RIVAROXABAN (XARELTO) 20 MG TAB    Take 20 mg by mouth once daily.    SERTRALINE (ZOLOFT) 100 MG TABLET    TAKE 1 TABLET DAILY    TRAMADOL (ULTRAM) 50 MG TABLET    Take 1 tablet by mouth once daily.    TRAZODONE (DESYREL) 100 MG TABLET    TAKE 1 TABLET AT BEDTIME ONCE A DAY

## 2018-09-05 NOTE — PROGRESS NOTES
Patient ID by name and . Allergies verified. Tetanus vaccine given IM in left deltoid using aseptic technique. Aspirated with no blood noted. Patient tolerated well. Given per physicians order. No adverse reactions noted.

## 2018-09-06 ENCOUNTER — OFFICE VISIT (OUTPATIENT)
Dept: PAIN MEDICINE | Facility: CLINIC | Age: 68
End: 2018-09-06
Attending: ANESTHESIOLOGY
Payer: MEDICARE

## 2018-09-06 VITALS
HEIGHT: 71 IN | SYSTOLIC BLOOD PRESSURE: 106 MMHG | WEIGHT: 256.13 LBS | TEMPERATURE: 98 F | BODY MASS INDEX: 35.86 KG/M2 | DIASTOLIC BLOOD PRESSURE: 71 MMHG | HEART RATE: 70 BPM

## 2018-09-06 DIAGNOSIS — E66.9 OBESITY WITH BODY MASS INDEX OF 30.0-39.9: ICD-10-CM

## 2018-09-06 DIAGNOSIS — M54.16 LUMBAR RADICULOPATHY: ICD-10-CM

## 2018-09-06 DIAGNOSIS — M51.36 DDD (DEGENERATIVE DISC DISEASE), LUMBAR: Primary | ICD-10-CM

## 2018-09-06 PROCEDURE — 99204 OFFICE O/P NEW MOD 45 MIN: CPT | Mod: S$PBB,,, | Performed by: ANESTHESIOLOGY

## 2018-09-06 PROCEDURE — 99999 PR PBB SHADOW E&M-EST. PATIENT-LVL V: CPT | Mod: PBBFAC,,, | Performed by: ANESTHESIOLOGY

## 2018-09-06 PROCEDURE — 99215 OFFICE O/P EST HI 40 MIN: CPT | Mod: PBBFAC,PN | Performed by: ANESTHESIOLOGY

## 2018-09-06 NOTE — PROGRESS NOTES
Chronic Pain - New Consult    Referring Physician: Kash Colin MD      Chief Complaint   Patient presents with    Low-back Pain        SUBJECTIVE:    Sveta العراقي is a 69 y/o female with hx of a-fib and TIA on Xarelto and Plavix who presents to the clinic for the evaluation of lower back pain. The pain started in July 2018 and symptoms have been persistent. No recent trauma or inciting event. She reports a history of chronic low back problems that have been on and off for years. The pain is located in the bilateral lower back area and radiates down the lateral aspect of the right leg stopping in the calf.  The pain is described as aching, burning, sharp and stabbing and is rated as 9/10. The pain is rated with a score of  7/10 on the BEST day and a score of 10/10 on the WORST day.  Symptoms interfere with daily activity and sleeping. The pain is exacerbated by Standing, Walking and Getting out of bed/chair.  The pain is mitigated by heat and rest.  The patient reports 3 hours of uninterrupted sleep per night.    Patient denies bowel/bladder incontinence, significant motor weakness and loss of sensations.    Physical Therapy/Home Exercise: yes      Pain Disability Index Review:  Last 3 PDI Scores 9/6/2018   Pain Disability Index (PDI) 50       Pain Medications:    - Opioids: Ultram (Tramadol HCL) 50 mg daily as needed  - Gabapentin 300 mg TID  - Flexeril 10 mg q8 hours as needed    Anti-coagulants:    - Xarelto 20 mg daily  - Plavix 75 mg daily     report: Reviewed    Pain Procedures:   Epidural steroid injections around 2011 in [place]    Imaging:     Lumbar MRI (8/16/2018):    FINDINGS:  No destructive bone lesions are identified.  The cord ends at L1-2 interspace.  Small focal areas of increased T2 signal in the kidneys are less numerous at least 1 is seen in the left kidney upper pole.  Consistent with cyst.    At T12-L1, L1-2, L2-3 normal disc, central canal, foramina and facets.    At L3-4 normal  disc height with disc desiccation.  Redemonstration of broad-based disc herniation, a contained protrusion 3.4 mm thick, and posterior ligamentous hypertrophy.  AP central canal diameter 10 mm.  There is contact with the transiting L4 nerve roots bilaterally without definite displacement.  Normal foramina and facets.    At L4-5 normal disc height with disc desiccation.  Broad-based herniation, a contained protrusion 4.3 mm thickness, minimal ligament hypertrophy.  AP central canal diameter 11 mm.  Normal foramina.  Facet hypertrophy and degeneration bilaterally.    At L5-S1 normal disc height with disc desiccation.  Broad-based herniation a contain protrusion that is mildly asymmetrical across the central canal, maximum thickness to the right of the canal 5 mm, with displacement and mild compression of the transiting right S1 nerve root.  AP central canal diameter 11 mm.  The left foramen shows contact of the herniated disc with the exiting L5 nerve root displacing it cephalad, loss of perineural fat approximately 50%.  The right foramen appears to have adequate perineural fat.  Facet degeneration and hypertrophy bilaterally.    Normal sacroiliac joints.    Past Medical History:   Diagnosis Date    A-fib 2017    Abnormal stress test 2017    Asthma     Chest pain 2018    Cholelithiasis     Chronic back pain     Depression     GERD (gastroesophageal reflux disease)     Hernia     Hyperlipidemia 2017    Hypertension     Osteoarthritis     knee and back pain    Osteoporosis     Pneumonia     history    PTSD (post-traumatic stress disorder)     Sleep apnea     could not tolerate c pap    Stroke 2017    no residual   tia     Past Surgical History:   Procedure Laterality Date    ABDOMINAL SURGERY      bile duct repair x2    APPENDECTOMY      CARDIAC CATHETERIZATION  01/26/2018    CHOLECYSTECTOMY      EYE SURGERY      cataracts    HIP ARTHROSCOPY W/ LABRAL REPAIR      right    HYSTERECTOMY      loop  recorder  11/2017    ROTATOR CUFF REPAIR      TOTAL KNEE ARTHROPLASTY      kash.     Social History     Socioeconomic History    Marital status:      Spouse name: Not on file    Number of children: Not on file    Years of education: Not on file    Highest education level: Not on file   Social Needs    Financial resource strain: Not on file    Food insecurity - worry: Not on file    Food insecurity - inability: Not on file    Transportation needs - medical: Not on file    Transportation needs - non-medical: Not on file   Occupational History    Not on file   Tobacco Use    Smoking status: Never Smoker    Smokeless tobacco: Never Used   Substance and Sexual Activity    Alcohol use: Yes     Comment: social    Drug use: No    Sexual activity: Not on file   Other Topics Concern    Not on file   Social History Narrative    Not on file     Family History   Family history unknown: Yes       Review of patient's allergies indicates:   Allergen Reactions    Baclofen Hives    Pamelor [nortriptyline] Palpitations       Current Outpatient Medications   Medication Sig    alendronate (FOSAMAX) 35 MG tablet Take 1 tablet (35 mg total) by mouth every 7 days.    amLODIPine (NORVASC) 5 MG tablet Take 5 mg by mouth once daily.    atorvastatin (LIPITOR) 20 MG tablet Take 1 tablet by mouth once daily.    calcium carbonate-vit D3-min 600 mg calcium- 400 unit Tab Take 1 tablet by mouth 2 (two) times daily.    clopidogrel (PLAVIX) 75 mg tablet Take 75 mg by mouth once daily.    cyclobenzaprine (FLEXERIL) 10 MG tablet TAKE ONE TABLET BY MOUTH THREE TIMES A DAY (Patient taking differently: TAKE ONE TABLET BY MOUTH BID)    doxycycline (VIBRAMYCIN) 100 MG Cap Take 1 capsule (100 mg total) by mouth every 12 (twelve) hours. for 10 days    furosemide (LASIX) 20 MG tablet TAKE 1 TABLET ONCE DAILY    gabapentin (NEURONTIN) 300 MG capsule Take 1 capsule (300 mg total) by mouth 3 (three) times daily.    lisinopril  (PRINIVIL,ZESTRIL) 20 MG tablet Take 20 mg by mouth once daily.    metoprolol succinate (TOPROL-XL) 25 MG 24 hr tablet Take 25 mg by mouth once daily.    montelukast (SINGULAIR) 10 mg tablet TAKE 1 TABLET ONCE DAILY IN THE EVENING    mupirocin (BACTROBAN) 2 % ointment Apply topically 3 (three) times daily.    potassium chloride (MICRO-K) 8 mEq CpSR TAKE 1 CAPSULE DAILY    promethazine (PHENERGAN) 25 MG tablet Take 25 mg by mouth daily as needed for Nausea.    rivaroxaban (XARELTO) 20 mg Tab Take 20 mg by mouth once daily.    sertraline (ZOLOFT) 100 MG tablet TAKE 1 TABLET DAILY    traMADol (ULTRAM) 50 mg tablet Take 1 tablet by mouth once daily.    traZODone (DESYREL) 100 MG tablet TAKE 1 TABLET AT BEDTIME ONCE A DAY    albuterol (ACCUNEB) 1.25 mg/3 mL Nebu Take 1.25 mg by nebulization every 8 (eight) hours as needed. Rescue    fluticasone-salmeterol 250-50 mcg/dose (ADVAIR DISKUS) 250-50 mcg/dose diskus inhaler Inhale 1 puff into the lungs 2 (two) times daily. Controller    HYDROcodone-acetaminophen (NORCO) 5-325 mg per tablet Take 1 tablet by mouth every 6 (six) hours as needed for Pain.    PROAIR HFA 90 mcg/actuation inhaler 1 puff 2 (two) times daily.     No current facility-administered medications for this visit.        REVIEW OF SYSTEMS:    GENERAL:  No weight loss or fevers.  HEENT:  Negative for frequent or significant headaches.  NECK:  + neck pain  RESPIRATORY:  Negative for wheezing or shortness of breath.  CARDIOVASCULAR:  Negative for chest pain or palpitations.  GI:  Negative for abdominal discomfort, blood in stools or black stools or change in bowel habits.  MUSCULOSKELETAL:  See HPI.  SKIN:  Negative for lesions, rash, and itching.  PSYCH:  + sleep disturbance  HEMATOLOGY/LYMPHOLOGY:    Takes Xarelto and Plavix.  NEURO:   No history of seizures or tremors. Hx of TIA  All other reviewed and negative other than HPI.    OBJECTIVE:    /71 (BP Location: Left arm, Patient Position:  "Sitting)   Pulse 70   Temp 97.9 °F (36.6 °C) (Oral)   Ht 5' 11" (1.803 m)   Wt 116.2 kg (256 lb 1.6 oz)   BMI 35.72 kg/m²     PHYSICAL EXAMINATION:    General appearance: Well appearing, in no acute distress, alert and oriented x3. Obese.  Psych:  Mood and affect appropriate.  Skin: Skin color, texture, turgor normal, no rashes or lesions, in both upper and lower body.  Head/face:  Normocephalic, atraumatic.   Neck: No pain to palpation over the cervical paraspinous muscles.  No pain with neck flexion, extension, or lateral flexion.   Cor: RRR  Pulm: Breathing unlabored.  GI:  Soft and non-tender.  Back: Straight leg raising is negative to radicular pain. Tenderness to palpation over the lumbar spine and paraspinous muscles. + pain with back extension. Decreased range of motion on extension/flexion.  Extremities: Peripheral joint ROM is full and pain free without obvious instability or laxity in all four extremities. No deformities, edema, or skin discoloration.  Musculoskeletal: Bilateral upper and lower extremity strength is normal and symmetric.  No atrophy or tone abnormalities are noted.  Neuro: Bilateral lower extremity coordination and muscle stretch reflexes are physiologic and symmetric. No loss of sensation is noted.  Gait: Antalgic, uses cane.    ASSESSMENT: 68 y.o. obese female with chronic low back and right leg pain, consistent with discogenic pain and radiculitis.     1. DDD (degenerative disc disease), lumbar    2. Lumbar radiculopathy    3. Obesity with body mass index of 30.0-39.9        PLAN:      - I have stressed the importance of physical activity and a home exercise plan to help with pain and improve health.  - Referral placed to Physical therapy for modalities, lumbar stabilization, range of motion exercises and HEP.  - Schedule for a Right Transforaminal epidural steroid injection at L4/5 and L5/S1 to help with his pain and progress with a home exercise plan.  - Counseled patient " regarding the importance of physical therapy and weight loss.  - RTC after procedure.    The above plan and management options were discussed at length with patient. Patient is in agreement with the above and verbalized understanding. It will be communicated with the referring physician via electronic record, fax, or mail.    Rony Riddle III  09/06/2018

## 2018-09-06 NOTE — LETTER
September 6, 2018      Kash Colin MD  [address]  [address]  [place] [address]           [facility]  [address], [facility] [address]  [place] [address]  Phone: [phone]  Fax: [phone]          Patient: Sveta العراقي   MR Number: [id number]   YOB: 1950   Date of Visit: 9/6/2018       Dear Dr. Kash Colin:    Thank you for referring Sveta العراقي to me for evaluation. Attached you will find relevant portions of my assessment and plan of care.    If you have questions, please do not hesitate to call me. I look forward to following Sveta العراقي along with you.    Sincerely,    Rony Riddle III, MD    Enclosure  CC:  No Recipients    If you would like to receive this communication electronically, please contact [email or web address] or [phone] to request more information on [employer] Link access.    For providers and/or their staff who would like to refer a patient to [facility], please contact us through our one-stop-shop provider referral line, [facility], at [phone].    If you feel you have received this communication in error or would no longer like to receive these types of communications, please e-mail [email or web address]

## 2018-09-09 ENCOUNTER — NURSE TRIAGE (OUTPATIENT)
Dept: ADMINISTRATIVE | Facility: CLINIC | Age: 68
End: 2018-09-09

## 2018-09-10 NOTE — TELEPHONE ENCOUNTER
"    Reason for Disposition   [1] SEVERE pain (e.g. excruciating)  AND [2] not improved 2 hours after pain medicine/ice packs    Answer Assessment - Initial Assessment Questions  1. MECHANISM: "How did the injury happen?" (e.g., twisting injury, direct blow)       fell  2. ONSET: "When did the injury happen?" (Minutes or hours ago)      2 hrs ago  3. LOCATION: "Where is the injury located?"      Right lateral thigh leg  4. APPEARANCE of INJURY: "What does the injury look like?"  (e.g., deformity of leg)      Black and blue lump   5. SEVERITY: "Can you put weight on that leg?" "Can you walk?"    no  6. SIZE: For cuts, bruises, or swelling, ask: "How large is it?" (e.g., inches or centimeters)     Half dollar  7. PAIN: "Is there pain?" If so, ask: "How bad is the pain?"  (Scale 1-10; or mild, moderate, severe)     Severe when walking  8. TETANUS: For any breaks in the skin, ask: "When was the last tetanus booster?"   n/a  9. OTHER SYMPTOMS: "Do you have any other symptoms?"     no  10. PREGNANCY: "Is there any chance you are pregnant?" "When was your last menstrual period?"  n/a    Protocols used: ST LEG INJURY-A-      "

## 2018-09-21 ENCOUNTER — TELEPHONE (OUTPATIENT)
Dept: PAIN MEDICINE | Facility: CLINIC | Age: 68
End: 2018-09-21

## 2018-09-21 NOTE — TELEPHONE ENCOUNTER
Spoke with patient's daughter in law, Katherine, as patient was unavailable. Informed her that the patient was to stop taking her Plavix on 9/22 and Xarelto on 9/24 for her coming procedure. Katherine indicated understanding and will inform her mother in law. No further issues discussed.

## 2018-09-27 PROBLEM — M54.16 LUMBAR RADICULOPATHY: Status: ACTIVE | Noted: 2018-09-27

## 2018-09-27 PROBLEM — M51.36 LUMBAR DEGENERATIVE DISC DISEASE: Status: ACTIVE | Noted: 2018-09-27

## 2018-09-27 PROBLEM — M51.369 LUMBAR DEGENERATIVE DISC DISEASE: Status: ACTIVE | Noted: 2018-09-27

## 2018-10-17 RX ORDER — GABAPENTIN 300 MG/1
CAPSULE ORAL
Qty: 270 CAPSULE | Refills: 0 | Status: SHIPPED | OUTPATIENT
Start: 2018-10-17 | End: 2018-12-27 | Stop reason: SDUPTHER

## 2018-10-17 RX ORDER — POTASSIUM CHLORIDE 600 MG/1
CAPSULE, EXTENDED RELEASE ORAL
Qty: 90 CAPSULE | Refills: 0 | Status: ON HOLD | OUTPATIENT
Start: 2018-10-17 | End: 2018-10-30 | Stop reason: HOSPADM

## 2018-10-17 NOTE — TELEPHONE ENCOUNTER
Call tell gabapentin not usually refilled over the phone please get refills with office viists to prevent unnecesaary phone calls

## 2018-10-18 ENCOUNTER — OFFICE VISIT (OUTPATIENT)
Dept: PAIN MEDICINE | Facility: CLINIC | Age: 68
End: 2018-10-18
Attending: ANESTHESIOLOGY
Payer: MEDICARE

## 2018-10-18 VITALS
DIASTOLIC BLOOD PRESSURE: 67 MMHG | HEIGHT: 71 IN | HEART RATE: 68 BPM | BODY MASS INDEX: 35.14 KG/M2 | SYSTOLIC BLOOD PRESSURE: 116 MMHG | WEIGHT: 251 LBS

## 2018-10-18 DIAGNOSIS — M54.16 LUMBAR RADICULOPATHY: ICD-10-CM

## 2018-10-18 DIAGNOSIS — M51.36 DDD (DEGENERATIVE DISC DISEASE), LUMBAR: Primary | ICD-10-CM

## 2018-10-18 DIAGNOSIS — E66.9 OBESITY WITH BODY MASS INDEX OF 30.0-39.9: ICD-10-CM

## 2018-10-18 PROCEDURE — 99999 PR PBB SHADOW E&M-EST. PATIENT-LVL III: CPT | Mod: PBBFAC,,, | Performed by: ANESTHESIOLOGY

## 2018-10-18 PROCEDURE — 99214 OFFICE O/P EST MOD 30 MIN: CPT | Mod: S$PBB,,, | Performed by: ANESTHESIOLOGY

## 2018-10-18 PROCEDURE — 99213 OFFICE O/P EST LOW 20 MIN: CPT | Mod: PBBFAC,PN | Performed by: ANESTHESIOLOGY

## 2018-10-18 RX ORDER — TRAMADOL HYDROCHLORIDE 50 MG/1
50 TABLET ORAL DAILY PRN
Qty: 30 TABLET | Refills: 0 | Status: SHIPPED | OUTPATIENT
Start: 2018-10-18 | End: 2019-01-15 | Stop reason: SDUPTHER

## 2018-10-18 NOTE — PROGRESS NOTES
Chronic Pain - Established    INTERVAL HISTORY (10/18/2018):    Sveta العراقي presents to the clinic today for a follow-up appointment for low back pain. The patient reports 80% pain relief after Right Transforaminal epidural steroid injection at L4/5 and L5/S1 which lasted 11 days. She reports the pain in her low back and right leg returned after changing the sheets in her bed on October 8th. Current pain intensity is 9/10. The pain is located in the right lumbar paraspinal area and radiates down the lateral aspect of the leg into the foot. She has not started physical therapy. She is concerned about transportation to .    Patient denies bowel/bladder incontinence, significant motor weakness and loss of sensations.       SUBJECTIVE:    Sveta العراقي is a 67 y/o female with hx of a-fib and TIA on Xarelto and Plavix who presents to the clinic for the evaluation of lower back pain. The pain started in July 2018 and symptoms have been persistent. No recent trauma or inciting event. She reports a history of chronic low back problems that have been on and off for years. The pain is located in the bilateral lower back area and radiates down the lateral aspect of the right leg stopping in the calf.  The pain is described as aching, burning, sharp and stabbing and is rated as 9/10. The pain is rated with a score of  7/10 on the BEST day and a score of 10/10 on the WORST day.  Symptoms interfere with daily activity and sleeping. The pain is exacerbated by Standing, Walking and Getting out of bed/chair.  The pain is mitigated by heat and rest.  The patient reports 3 hours of uninterrupted sleep per night.    Patient denies bowel/bladder incontinence, significant motor weakness and loss of sensations.    Physical Therapy/Home Exercise: yes      Pain Disability Index Review:  Last 3 PDI Scores 10/18/2018 9/6/2018   Pain Disability Index (PDI) 55 50       Pain Medications:    - Opioids: Ultram (Tramadol HCL) 50 mg daily  as needed  - Gabapentin 300 mg TID  - Flexeril 10 mg q8 hours as needed    Anti-coagulants:    - Xarelto 20 mg daily  - Plavix 75 mg daily     report: Reviewed    Pain Procedures:   Right Transforaminal epidural steroid injection at L4/5 and L5/S1 (9/27/2018)  Epidural steroid injections around 2011 in [place]    Imaging:     Lumbar MRI (8/16/2018):    FINDINGS:  No destructive bone lesions are identified.  The cord ends at L1-2 interspace.  Small focal areas of increased T2 signal in the kidneys are less numerous at least 1 is seen in the left kidney upper pole.  Consistent with cyst.    At T12-L1, L1-2, L2-3 normal disc, central canal, foramina and facets.    At L3-4 normal disc height with disc desiccation.  Redemonstration of broad-based disc herniation, a contained protrusion 3.4 mm thick, and posterior ligamentous hypertrophy.  AP central canal diameter 10 mm.  There is contact with the transiting L4 nerve roots bilaterally without definite displacement.  Normal foramina and facets.    At L4-5 normal disc height with disc desiccation.  Broad-based herniation, a contained protrusion 4.3 mm thickness, minimal ligament hypertrophy.  AP central canal diameter 11 mm.  Normal foramina.  Facet hypertrophy and degeneration bilaterally.    At L5-S1 normal disc height with disc desiccation.  Broad-based herniation a contain protrusion that is mildly asymmetrical across the central canal, maximum thickness to the right of the canal 5 mm, with displacement and mild compression of the transiting right S1 nerve root.  AP central canal diameter 11 mm.  The left foramen shows contact of the herniated disc with the exiting L5 nerve root displacing it cephalad, loss of perineural fat approximately 50%.  The right foramen appears to have adequate perineural fat.  Facet degeneration and hypertrophy bilaterally.    Normal sacroiliac joints.    Past Medical History:   Diagnosis Date    A-fib 2017    Abnormal stress test 2017     Asthma     Chest pain 2018    Cholelithiasis     Chronic back pain     Depression     GERD (gastroesophageal reflux disease)     Hernia     Hyperlipidemia 2017    Hypertension     Osteoarthritis     knee and back pain    Osteoporosis     Pneumonia     history    PTSD (post-traumatic stress disorder)     Sleep apnea     could not tolerate c pap    Stroke 2017    no residual   tia     Past Surgical History:   Procedure Laterality Date    ABDOMINAL SURGERY      bile duct repair x2    APPENDECTOMY      CARDIAC CATHETERIZATION  01/26/2018    CHOLECYSTECTOMY      EYE SURGERY      cataracts    HIP ARTHROSCOPY W/ LABRAL REPAIR      right    HYSTERECTOMY      Injection,steroid,epidural,transforaminal approach RIGHT L4/5 AND L5/S1 LUMBAR Right 9/27/2018    Performed by Rony Riddle III, [facility] at [facility]    Left heart cath Left 1/26/2018    Performed by Nadeem Massey MD at [facility]    Loop insertion Left 11/8/2017    Performed by [facility]ade[facility]sey [facility] at [facility]    loop recorder  11/2017    Right heart cath Right 1/26/2018    Performed by [facility]    ROTATOR CUFF REPAIR      CHARISSA During Cath Case N/A 1/26/2018    Performed by [facility]ade[facility]sey [facility] at [facility]    TOTAL KNEE ARTHROPLASTY      kash.     Social History     Socioeconomic History    Marital status:      Spouse name: Not on file    Number of children: Not on file    Years of education: Not on file    Highest education level: Not on file   Social Needs    Financial resource strain: Not on file    Food insecurity - worry: Not on file    Food insecurity - inability: Not on file    Transportation needs - medical: Not on file    Transportation needs - non-medical: Not on file   Occupational History    Not on file   Tobacco Use    Smoking status: Never Smoker    Smokeless tobacco: Never Used   Substance and Sexual Activity    Alcohol use: Yes     Comment: social    Drug  use: No    Sexual activity: Not on file   Other Topics Concern    Not on file   Social History Narrative    Not on file     Family History   Family history unknown: Yes       Review of patient's allergies indicates:   Allergen Reactions    Baclofen Hives    Pamelor [nortriptyline] Palpitations       Current Outpatient Medications   Medication Sig    albuterol (ACCUNEB) 1.25 mg/3 mL Nebu Take 1.25 mg by nebulization every 8 (eight) hours as needed. Rescue    alendronate (FOSAMAX) 35 MG tablet Take 1 tablet (35 mg total) by mouth every 7 days.    amLODIPine (NORVASC) 5 MG tablet Take 5 mg by mouth once daily.    atorvastatin (LIPITOR) 20 MG tablet Take 1 tablet by mouth once daily.    calcium carbonate-vit D3-min 600 mg calcium- 400 unit Tab Take 1 tablet by mouth 2 (two) times daily.    clopidogrel (PLAVIX) 75 mg tablet Take 75 mg by mouth once daily.    cyclobenzaprine (FLEXERIL) 10 MG tablet TAKE ONE TABLET BY MOUTH THREE TIMES A DAY (Patient taking differently: TAKE ONE TABLET BY MOUTH BID)    furosemide (LASIX) 20 MG tablet TAKE 1 TABLET ONCE DAILY    gabapentin (NEURONTIN) 300 MG capsule TAKE 1 CAPSULE THREE TIMES A DAY    lisinopril (PRINIVIL,ZESTRIL) 20 MG tablet Take 20 mg by mouth once daily.    metoprolol succinate (TOPROL-XL) 25 MG 24 hr tablet Take 25 mg by mouth once daily.    montelukast (SINGULAIR) 10 mg tablet TAKE 1 TABLET ONCE DAILY IN THE EVENING    mupirocin (BACTROBAN) 2 % ointment Apply topically 3 (three) times daily.    potassium chloride (MICRO-K) 8 mEq CpSR TAKE 1 CAPSULE DAILY    promethazine (PHENERGAN) 25 MG tablet Take 25 mg by mouth daily as needed for Nausea.    rivaroxaban (XARELTO) 20 mg Tab Take 20 mg by mouth once daily.    sertraline (ZOLOFT) 100 MG tablet TAKE 1 TABLET DAILY    traMADol (ULTRAM) 50 mg tablet Take 1 tablet (50 mg total) by mouth daily as needed for Pain.    traZODone (DESYREL) 100 MG tablet TAKE 1 TABLET AT BEDTIME ONCE A DAY     No current  "facility-administered medications for this visit.        REVIEW OF SYSTEMS:    GENERAL:  No weight loss or fevers.  HEENT:  Negative for frequent or significant headaches.  NECK:  + neck pain  RESPIRATORY:  Negative for wheezing or shortness of breath.  CARDIOVASCULAR:  Negative for chest pain or palpitations.  GI:  Negative for abdominal discomfort, blood in stools or black stools or change in bowel habits.  MUSCULOSKELETAL:  See HPI.  SKIN:  Negative for lesions, rash, and itching.  PSYCH:  + sleep disturbance  HEMATOLOGY/LYMPHOLOGY:    Takes Xarelto and Plavix.  NEURO:   No history of seizures or tremors. Hx of TIA  All other reviewed and negative other than HPI.    OBJECTIVE:    /67 (BP Location: Right arm, Patient Position: Sitting, BP Method: Large (Automatic))   Pulse 68   Ht 5' 11" (1.803 m)   Wt 113.9 kg (251 lb)   BMI 35.01 kg/m²     PHYSICAL EXAMINATION:    General appearance: Well appearing, in no acute distress, alert and oriented x3. Obese.  Psych:  Mood and affect appropriate.  Skin: Skin color, texture, turgor normal, no rashes or lesions, in both upper and lower body.  Head/face:  Normocephalic, atraumatic.   Neck: No pain to palpation over the cervical paraspinous muscles.  No pain with neck flexion, extension, or lateral flexion.   Cor: RRR  Pulm: Breathing unlabored.  GI:  Soft and non-tender.  Back: Straight leg raising is negative to radicular pain. Tenderness to palpation over the lumbar spine and paraspinous muscles. Decreased range of motion on extension/flexion.  Extremities: Peripheral joint ROM is full and pain free without obvious instability or laxity in all four extremities. No deformities, edema, or skin discoloration.  Musculoskeletal: Bilateral upper and lower extremity strength is normal and symmetric.  No atrophy or tone abnormalities are noted.  Neuro: No loss of sensation is noted.  Gait: Antalgic, uses cane.    ASSESSMENT:     1. DDD (degenerative disc disease), " lumbar    2. Lumbar radiculopathy    3. Obesity with body mass index of 30.0-39.9            PLAN:      - I have stressed the importance of physical activity and a home exercise plan to help with pain and improve health.  - Recommend starting Physical therapy for modalities, lumbar stabilization, range of motion exercises and HEP. Order placed at last visit.  - Refill Tramadol 50 mg daily as needed.  - Schedule for a Repeat Right Transforaminal epidural steroid injection at L4/5 and L5/S1 to help with pain and progress with a home exercise plan.  - Counseled patient regarding the importance of physical therapy and weight loss.  - RTC after procedure.    The above plan and management options were discussed at length with patient. Patient is in agreement with the above and verbalized understanding. It will be communicated with the referring physician via electronic record, fax, or mail.    Rony Riddle III  10/18/2018

## 2018-10-29 ENCOUNTER — TELEPHONE (OUTPATIENT)
Dept: PRIMARY CARE CLINIC | Facility: CLINIC | Age: 68
End: 2018-10-29

## 2018-10-29 PROBLEM — R19.7 DIARRHEA: Status: ACTIVE | Noted: 2018-10-29

## 2018-10-29 PROBLEM — E87.1 HYPONATREMIA: Status: ACTIVE | Noted: 2018-10-29

## 2018-10-29 PROBLEM — N18.9 ACUTE-ON-CHRONIC KIDNEY INJURY: Status: ACTIVE | Noted: 2018-10-29

## 2018-10-29 PROBLEM — E86.0 DEHYDRATION: Status: ACTIVE | Noted: 2018-10-29

## 2018-10-29 PROBLEM — N17.9 ACUTE-ON-CHRONIC KIDNEY INJURY: Status: ACTIVE | Noted: 2018-10-29

## 2018-11-30 ENCOUNTER — OFFICE VISIT (OUTPATIENT)
Dept: PRIMARY CARE CLINIC | Facility: CLINIC | Age: 68
End: 2018-11-30
Payer: MEDICARE

## 2018-11-30 VITALS
DIASTOLIC BLOOD PRESSURE: 65 MMHG | SYSTOLIC BLOOD PRESSURE: 97 MMHG | TEMPERATURE: 98 F | HEART RATE: 66 BPM | OXYGEN SATURATION: 98 % | RESPIRATION RATE: 18 BRPM | WEIGHT: 254 LBS | BODY MASS INDEX: 35.56 KG/M2 | HEIGHT: 71 IN

## 2018-11-30 DIAGNOSIS — J18.9 PNEUMONIA OF LEFT UPPER LOBE DUE TO INFECTIOUS ORGANISM: Primary | ICD-10-CM

## 2018-11-30 PROCEDURE — 99214 OFFICE O/P EST MOD 30 MIN: CPT | Mod: S$PBB,,, | Performed by: FAMILY MEDICINE

## 2018-11-30 PROCEDURE — 99999 PR PBB SHADOW E&M-EST. PATIENT-LVL III: CPT | Mod: PBBFAC,,, | Performed by: FAMILY MEDICINE

## 2018-11-30 PROCEDURE — 99213 OFFICE O/P EST LOW 20 MIN: CPT | Mod: PBBFAC,PN | Performed by: FAMILY MEDICINE

## 2018-11-30 RX ORDER — LEVOFLOXACIN 500 MG/1
500 TABLET, FILM COATED ORAL DAILY
Qty: 7 TABLET | Refills: 0 | Status: SHIPPED | OUTPATIENT
Start: 2018-11-30 | End: 2018-12-07

## 2018-11-30 RX ORDER — ALBUTEROL SULFATE 90 UG/1
2 AEROSOL, METERED RESPIRATORY (INHALATION) EVERY 6 HOURS PRN
COMMUNITY
End: 2020-01-03

## 2018-11-30 RX ORDER — PREDNISONE 20 MG/1
TABLET ORAL
Qty: 10 TABLET | Refills: 0 | Status: SHIPPED | OUTPATIENT
Start: 2018-11-30 | End: 2018-12-07

## 2018-11-30 RX ORDER — DOXYCYCLINE 100 MG/1
100 CAPSULE ORAL EVERY 12 HOURS
Qty: 20 CAPSULE | Refills: 0 | Status: SHIPPED | OUTPATIENT
Start: 2018-11-30 | End: 2018-11-30

## 2018-11-30 NOTE — PROGRESS NOTES
"Subjective:       Patient ID: Sveta العراقي is a 68 y.o. female.    Chief Complaint: Chest Congestion (x1 week ) and Cough    Cough   This is a new problem. The current episode started in the past 7 days. The problem has been unchanged. The problem occurs every few minutes. The cough is non-productive. Associated symptoms include shortness of breath and wheezing. Pertinent negatives include no chest pain, fever, hemoptysis or rash. The symptoms are aggravated by lying down. She has tried a beta-agonist inhaler for the symptoms. The treatment provided mild relief. Her past medical history is significant for asthma.     Review of Systems   Constitutional: Negative for fever.   HENT: Negative for trouble swallowing.    Eyes: Negative for visual disturbance.   Respiratory: Positive for cough, shortness of breath and wheezing. Negative for hemoptysis.    Cardiovascular: Negative for chest pain.   Gastrointestinal: Positive for nausea.   Genitourinary: Negative for difficulty urinating.   Skin: Negative for rash.   Psychiatric/Behavioral: Negative for agitation and confusion.       Objective:      Vitals:    11/30/18 1248   BP: 97/65   BP Location: Left arm   Patient Position: Sitting   BP Method: Large (Automatic)   Pulse: 66   Resp: 18   Temp: 98.4 °F (36.9 °C)   TempSrc: Oral   SpO2: 98%   Weight: 115.2 kg (254 lb)   Height: 5' 11" (1.803 m)     Physical Exam   Constitutional: She is oriented to person, place, and time. She appears well-developed and well-nourished.   HENT:   Head: Normocephalic and atraumatic.   Mouth/Throat: Oropharynx is clear and moist.   Neck: No JVD present.   Cardiovascular: Normal rate, regular rhythm and normal heart sounds.   Pulmonary/Chest: Effort normal. No respiratory distress. She has wheezes in the right lower field and the left lower field. She has no rhonchi. She has no rales.   Musculoskeletal: She exhibits no edema.   Neurological: She is alert and oriented to person, place, " and time.   Skin: Skin is warm and dry.   Psychiatric: She has a normal mood and affect. Her behavior is normal.   Nursing note and vitals reviewed.      Assessment:       1. Pneumonia of left upper lobe due to infectious organism        Plan:       Pneumonia of left upper lobe due to infectious organism  -     Discontinue: doxycycline (VIBRAMYCIN) 100 MG Cap; Take 1 capsule (100 mg total) by mouth every 12 (twelve) hours. for 10 days  Dispense: 20 capsule; Refill: 0  -     predniSONE (DELTASONE) 20 MG tablet; Take 2 tablets (40 mg total) by mouth once daily for 3 days, THEN 1 tablet (20 mg total) once daily for 4 days.  Dispense: 10 tablet; Refill: 0  -     X-Ray Chest PA And Lateral; Future; Expected date: 11/30/2018  -     levoFLOXacin (LEVAQUIN) 500 MG tablet; Take 1 tablet (500 mg total) by mouth once daily. for 7 days  Dispense: 7 tablet; Refill: 0  -     X-Ray Chest PA And Lateral; Future; Expected date: 12/14/2018           Medication List           Accurate as of 11/30/18  2:03 PM. If you have any questions, ask your nurse or doctor.               START taking these medications    levoFLOXacin 500 MG tablet  Commonly known as:  LEVAQUIN  Take 1 tablet (500 mg total) by mouth once daily. for 7 days  Started by:  Kash Colin MD     predniSONE 20 MG tablet  Commonly known as:  DELTASONE  Take 2 tablets (40 mg total) by mouth once daily for 3 days, THEN 1 tablet (20 mg total) once daily for 4 days.  Start taking on:  11/30/2018  Started by:  Kash Colin MD        CHANGE how you take these medications    cyclobenzaprine 10 MG tablet  Commonly known as:  FLEXERIL  TAKE ONE TABLET BY MOUTH THREE TIMES A DAY  What changed:    · how much to take  · how to take this  · when to take this        CONTINUE taking these medications    albuterol 90 mcg/actuation inhaler  Commonly known as:  PROVENTIL/VENTOLIN HFA     alendronate 35 MG tablet  Commonly known as:  FOSAMAX  Take 1 tablet (35 mg total) by mouth every  7 days.     amLODIPine 5 MG tablet  Commonly known as:  NORVASC     atorvastatin 20 MG tablet  Commonly known as:  LIPITOR     calcium carbonate-vit D3-min 600 mg calcium- 400 unit Tab  Take 1 tablet by mouth 2 (two) times daily.     clopidogrel 75 mg tablet  Commonly known as:  PLAVIX     gabapentin 300 MG capsule  Commonly known as:  NEURONTIN  TAKE 1 CAPSULE THREE TIMES A DAY     metoprolol succinate 25 MG 24 hr tablet  Commonly known as:  TOPROL-XL     montelukast 10 mg tablet  Commonly known as:  SINGULAIR  TAKE 1 TABLET ONCE DAILY IN THE EVENING     mupirocin 2 % ointment  Commonly known as:  BACTROBAN  Apply topically 3 (three) times daily.     promethazine 25 MG tablet  Commonly known as:  PHENERGAN     sertraline 100 MG tablet  Commonly known as:  ZOLOFT  TAKE 1 TABLET DAILY     traMADol 50 mg tablet  Commonly known as:  ULTRAM  Take 1 tablet (50 mg total) by mouth daily as needed for Pain.     traZODone 100 MG tablet  Commonly known as:  DESYREL  TAKE 1 TABLET AT BEDTIME ONCE A DAY     XARELTO 20 mg Tab  Generic drug:  rivaroxaban           Where to Get Your Medications      These medications were sent to [id number] - [address] [address]    Phone:  [phone]   · levoFLOXacin 500 MG tablet  · predniSONE 20 MG tablet

## 2018-12-07 ENCOUNTER — PATIENT MESSAGE (OUTPATIENT)
Dept: PAIN MEDICINE | Facility: CLINIC | Age: 68
End: 2018-12-07

## 2018-12-11 DIAGNOSIS — F51.01 PRIMARY INSOMNIA: ICD-10-CM

## 2018-12-11 RX ORDER — TRAZODONE HYDROCHLORIDE 100 MG/1
TABLET ORAL
Qty: 90 TABLET | Refills: 1 | Status: SHIPPED | OUTPATIENT
Start: 2018-12-11 | End: 2019-08-01 | Stop reason: SDUPTHER

## 2018-12-27 ENCOUNTER — TELEPHONE (OUTPATIENT)
Dept: PAIN MEDICINE | Facility: CLINIC | Age: 68
End: 2018-12-27

## 2018-12-27 NOTE — TELEPHONE ENCOUNTER
Spoke with patient regarding scheduling her appointment for procedure that was previously canceled. Patient would like to reschedule for 1/3/19. Informed patient that cardiac clearance was obtained from Dr Massey's office and she is to hold her Plavix starting 12/29/18 and the Xarelto starting 12/31/18. Patient indicated understanding of the information provided to her. No further issues discussed.

## 2018-12-28 DIAGNOSIS — M54.16 LUMBAR RADICULOPATHY: Primary | ICD-10-CM

## 2018-12-28 DIAGNOSIS — M51.36 DDD (DEGENERATIVE DISC DISEASE), LUMBAR: ICD-10-CM

## 2019-01-02 RX ORDER — GABAPENTIN 300 MG/1
300 CAPSULE ORAL 3 TIMES DAILY
Qty: 270 CAPSULE | Refills: 1 | Status: SHIPPED | OUTPATIENT
Start: 2019-01-02 | End: 2019-08-01 | Stop reason: SDUPTHER

## 2019-01-05 ENCOUNTER — PATIENT MESSAGE (OUTPATIENT)
Dept: PRIMARY CARE CLINIC | Facility: CLINIC | Age: 69
End: 2019-01-05

## 2019-01-07 ENCOUNTER — OFFICE VISIT (OUTPATIENT)
Dept: PRIMARY CARE CLINIC | Facility: CLINIC | Age: 69
End: 2019-01-07
Payer: MEDICARE

## 2019-01-07 VITALS
RESPIRATION RATE: 18 BRPM | WEIGHT: 257.69 LBS | TEMPERATURE: 98 F | HEIGHT: 71 IN | HEART RATE: 90 BPM | OXYGEN SATURATION: 99 % | BODY MASS INDEX: 36.08 KG/M2 | SYSTOLIC BLOOD PRESSURE: 133 MMHG | DIASTOLIC BLOOD PRESSURE: 81 MMHG

## 2019-01-07 DIAGNOSIS — I48.0 PAROXYSMAL ATRIAL FIBRILLATION: Primary | ICD-10-CM

## 2019-01-07 DIAGNOSIS — M26.621 ARTHRALGIA OF RIGHT TEMPOROMANDIBULAR JOINT: ICD-10-CM

## 2019-01-07 DIAGNOSIS — R06.09 DOE (DYSPNEA ON EXERTION): ICD-10-CM

## 2019-01-07 PROBLEM — N18.9 ACUTE-ON-CHRONIC KIDNEY INJURY: Status: RESOLVED | Noted: 2018-10-29 | Resolved: 2019-01-07

## 2019-01-07 PROBLEM — E87.1 HYPONATREMIA: Status: RESOLVED | Noted: 2018-10-29 | Resolved: 2019-01-07

## 2019-01-07 PROBLEM — E86.0 DEHYDRATION: Status: RESOLVED | Noted: 2018-10-29 | Resolved: 2019-01-07

## 2019-01-07 PROBLEM — R19.7 DIARRHEA: Status: RESOLVED | Noted: 2018-10-29 | Resolved: 2019-01-07

## 2019-01-07 PROBLEM — N17.9 ACUTE-ON-CHRONIC KIDNEY INJURY: Status: RESOLVED | Noted: 2018-10-29 | Resolved: 2019-01-07

## 2019-01-07 PROCEDURE — 99999 PR PBB SHADOW E&M-EST. PATIENT-LVL IV: ICD-10-PCS | Mod: PBBFAC,,, | Performed by: FAMILY MEDICINE

## 2019-01-07 PROCEDURE — 93010 ELECTROCARDIOGRAM REPORT: CPT | Mod: S$PBB,,, | Performed by: INTERNAL MEDICINE

## 2019-01-07 PROCEDURE — 99214 PR OFFICE/OUTPT VISIT, EST, LEVL IV, 30-39 MIN: ICD-10-PCS | Mod: S$PBB,,, | Performed by: FAMILY MEDICINE

## 2019-01-07 PROCEDURE — 93010 EKG 12-LEAD: ICD-10-PCS | Mod: S$PBB,,, | Performed by: INTERNAL MEDICINE

## 2019-01-07 PROCEDURE — 99214 OFFICE O/P EST MOD 30 MIN: CPT | Mod: PBBFAC,PN,25 | Performed by: FAMILY MEDICINE

## 2019-01-07 PROCEDURE — 99999 PR PBB SHADOW E&M-EST. PATIENT-LVL IV: CPT | Mod: PBBFAC,,, | Performed by: FAMILY MEDICINE

## 2019-01-07 PROCEDURE — 93005 ELECTROCARDIOGRAM TRACING: CPT | Mod: PBBFAC,PN | Performed by: INTERNAL MEDICINE

## 2019-01-07 PROCEDURE — 99214 OFFICE O/P EST MOD 30 MIN: CPT | Mod: S$PBB,,, | Performed by: FAMILY MEDICINE

## 2019-01-07 RX ORDER — LIDOCAINE AND PRILOCAINE 25; 25 MG/G; MG/G
CREAM TOPICAL 3 TIMES DAILY PRN
COMMUNITY

## 2019-01-07 RX ORDER — PROMETHAZINE HYDROCHLORIDE 25 MG/1
25 TABLET ORAL EVERY 6 HOURS PRN
Qty: 60 TABLET | Refills: 1 | Status: SHIPPED | OUTPATIENT
Start: 2019-01-07 | End: 2019-12-05 | Stop reason: SDUPTHER

## 2019-01-07 RX ORDER — POTASSIUM CHLORIDE 600 MG/1
CAPSULE, EXTENDED RELEASE ORAL
Qty: 90 CAPSULE | Refills: 0 | Status: SHIPPED | OUTPATIENT
Start: 2019-01-07 | End: 2019-01-07 | Stop reason: SDUPTHER

## 2019-01-07 NOTE — PROGRESS NOTES
"Subjective:       Patient ID: Sveta العراقي is a 68 y.o. female.    Chief Complaint: Follow-up (f/u from [facility] ER for TMJ)    Went to ER with right sided jaw pain, maxillofacial CT showed finding c/w bilateral TMJ. Still having pain.  Also says she has been feeling bad for the past several days with sore throat, heart racing with minimal activity, SoB with activity, wheezing, nonproductive cough.      Review of Systems   Constitutional: Positive for fatigue. Negative for fever.   HENT: Positive for sore throat. Negative for trouble swallowing.    Eyes: Negative for visual disturbance.   Respiratory: Positive for cough, shortness of breath and wheezing.    Cardiovascular: Positive for palpitations. Negative for chest pain and leg swelling.   Genitourinary: Negative for difficulty urinating.   Musculoskeletal: Positive for arthralgias.   Skin: Negative for rash.   Neurological: Negative for seizures.   Hematological: Bruises/bleeds easily.   Psychiatric/Behavioral: Negative for agitation and confusion.       Objective:      Vitals:    01/07/19 1336   BP: 133/81   BP Location: Right arm   Patient Position: Sitting   BP Method: Large (Automatic)   Pulse: 90   Resp: 18   Temp: 98.1 °F (36.7 °C)   TempSrc: Oral   SpO2: 99%   Weight: 116.9 kg (257 lb 11.2 oz)   Height: 5' 11" (1.803 m)     Physical Exam   Constitutional: She is oriented to person, place, and time. She appears well-developed and well-nourished.   HENT:   Head: Normocephalic and atraumatic.       Right Ear: External ear normal.   Left Ear: External ear normal.   Mouth/Throat: Oropharynx is clear and moist.   Cardiovascular: Regular rhythm, normal heart sounds and normal pulses. Tachycardia present.   Pulmonary/Chest: Effort normal and breath sounds normal. No respiratory distress. She has no wheezes. She has no rhonchi. She has no rales.   Musculoskeletal: She exhibits no edema.   Neurological: She is alert and oriented to person, place, and time. "   Skin: Skin is warm and dry.   Nursing note and vitals reviewed.      Assessment:       1. Paroxysmal atrial fibrillation    2. BAEZ (dyspnea on exertion)    3. Arthralgia of right temporomandibular joint        Plan:       Paroxysmal atrial fibrillation  -     EKG 12-lead  Normal sinus rhythm on EKG, no acute changes.  Patient instructed to follow up with cardiologist for further evaluation  BAEZ (dyspnea on exertion)  -     X-Ray Chest PA And Lateral; Future; Expected date: 01/07/2019  Chest x-ray reviewed, unremarkable.  Follow up with Cardiology  Arthralgia of right temporomandibular joint  Use ice, follow-up with oral surgeon  Other orders  -     Cancel: Pneumococcal Polysaccharide Vaccine (23 Valent) (SQ/IM)         Medication List           Accurate as of 1/7/19  5:50 PM. If you have any questions, ask your nurse or doctor.               CONTINUE taking these medications    albuterol 90 mcg/actuation inhaler  Commonly known as:  PROVENTIL/VENTOLIN HFA     alendronate 35 MG tablet  Commonly known as:  FOSAMAX  Take 1 tablet (35 mg total) by mouth every 7 days.     amLODIPine 5 MG tablet  Commonly known as:  NORVASC     atorvastatin 20 MG tablet  Commonly known as:  LIPITOR     calcium carbonate-vit D3-min 600 mg calcium- 400 unit Tab  Take 1 tablet by mouth 2 (two) times daily.     clopidogrel 75 mg tablet  Commonly known as:  PLAVIX     gabapentin 300 MG capsule  Commonly known as:  NEURONTIN  Take 1 capsule (300 mg total) by mouth 3 (three) times daily.     ketorolac 10 mg tablet  Commonly known as:  TORADOL  Take 1 tablet (10 mg total) by mouth every 6 (six) hours as needed for Pain.     lidocaine-prilocaine cream  Commonly known as:  EMLA     metoprolol succinate 25 MG 24 hr tablet  Commonly known as:  TOPROL-XL     montelukast 10 mg tablet  Commonly known as:  SINGULAIR  TAKE 1 TABLET ONCE DAILY IN THE EVENING     potassium chloride 8 MEQ Tbsr  Commonly known as:  KLOR-CON     primidone 50 MG Tab  Commonly  known as:  MYSOLINE     promethazine 25 MG tablet  Commonly known as:  PHENERGAN  Take 1 tablet (25 mg total) by mouth every 6 (six) hours as needed for Nausea.     sertraline 100 MG tablet  Commonly known as:  ZOLOFT  TAKE 1 TABLET DAILY     traMADol 50 mg tablet  Commonly known as:  ULTRAM  Take 1 tablet (50 mg total) by mouth daily as needed for Pain.     traZODone 100 MG tablet  Commonly known as:  DESYREL  TAKE 1 TABLET DAILY AT BEDTIME     XARELTO 20 mg Tab  Generic drug:  rivaroxaban

## 2019-01-12 ENCOUNTER — PATIENT MESSAGE (OUTPATIENT)
Dept: PRIMARY CARE CLINIC | Facility: CLINIC | Age: 69
End: 2019-01-12

## 2019-01-14 DIAGNOSIS — M54.16 LUMBAR RADICULOPATHY: ICD-10-CM

## 2019-01-14 DIAGNOSIS — M51.36 DDD (DEGENERATIVE DISC DISEASE), LUMBAR: ICD-10-CM

## 2019-01-14 RX ORDER — MONTELUKAST SODIUM 10 MG/1
TABLET ORAL
Qty: 90 TABLET | Refills: 1 | Status: SHIPPED | OUTPATIENT
Start: 2019-01-14 | End: 2019-07-19 | Stop reason: SDUPTHER

## 2019-01-15 RX ORDER — POTASSIUM CHLORIDE 600 MG/1
8 TABLET, FILM COATED, EXTENDED RELEASE ORAL DAILY
Qty: 90 TABLET | Refills: 1 | Status: SHIPPED | OUTPATIENT
Start: 2019-01-15 | End: 2019-10-07 | Stop reason: SDUPTHER

## 2019-01-15 RX ORDER — TRAMADOL HYDROCHLORIDE 50 MG/1
50 TABLET ORAL DAILY PRN
Qty: 30 TABLET | Refills: 1 | Status: SHIPPED | OUTPATIENT
Start: 2019-01-15 | End: 2019-02-22 | Stop reason: SDUPTHER

## 2019-01-15 NOTE — TELEPHONE ENCOUNTER
----- Message from Kaylyn Malik sent at 1/15/2019 10:26 AM CST -----  Type:  RX Refill Request    Who Called: Patient  Refill or New Rx:  refill  RX Name and Strength:  traMADol (ULTRAM) 50 mg tablet  How is the patient currently taking it? (ex. 1XDay):  Na  Is this a 30 day or 90 day RX:  30  Preferred Pharmacy with phone number:    [id number] [address] [address]  Phone: [phone] Fax: [phone]  Local or Mail Order:  local  Ordering Provider:  Cristi Licea Call Back Number:  [phone] (home)     Additional Information:  Lynn

## 2019-02-07 ENCOUNTER — OFFICE VISIT (OUTPATIENT)
Dept: PAIN MEDICINE | Facility: CLINIC | Age: 69
End: 2019-02-07
Attending: ANESTHESIOLOGY
Payer: MEDICARE

## 2019-02-07 VITALS
HEART RATE: 72 BPM | WEIGHT: 220.38 LBS | DIASTOLIC BLOOD PRESSURE: 64 MMHG | BODY MASS INDEX: 30.85 KG/M2 | HEIGHT: 71 IN | SYSTOLIC BLOOD PRESSURE: 116 MMHG

## 2019-02-07 DIAGNOSIS — G89.4 CHRONIC PAIN SYNDROME: ICD-10-CM

## 2019-02-07 DIAGNOSIS — M47.812 OSTEOARTHRITIS OF CERVICAL SPINE, UNSPECIFIED SPINAL OSTEOARTHRITIS COMPLICATION STATUS: ICD-10-CM

## 2019-02-07 DIAGNOSIS — M51.36 DDD (DEGENERATIVE DISC DISEASE), LUMBAR: ICD-10-CM

## 2019-02-07 DIAGNOSIS — M50.30 DDD (DEGENERATIVE DISC DISEASE), CERVICAL: ICD-10-CM

## 2019-02-07 DIAGNOSIS — M47.816 LUMBAR SPONDYLOSIS: Primary | ICD-10-CM

## 2019-02-07 DIAGNOSIS — F43.10 PTSD (POST-TRAUMATIC STRESS DISORDER): ICD-10-CM

## 2019-02-07 PROCEDURE — 99215 OFFICE O/P EST HI 40 MIN: CPT | Mod: PBBFAC,PN | Performed by: ANESTHESIOLOGY

## 2019-02-07 PROCEDURE — 99999 PR PBB SHADOW E&M-EST. PATIENT-LVL V: CPT | Mod: PBBFAC,,, | Performed by: ANESTHESIOLOGY

## 2019-02-07 PROCEDURE — 99214 PR OFFICE/OUTPT VISIT, EST, LEVL IV, 30-39 MIN: ICD-10-PCS | Mod: S$PBB,,, | Performed by: ANESTHESIOLOGY

## 2019-02-07 PROCEDURE — 99214 OFFICE O/P EST MOD 30 MIN: CPT | Mod: S$PBB,,, | Performed by: ANESTHESIOLOGY

## 2019-02-07 PROCEDURE — 99999 PR PBB SHADOW E&M-EST. PATIENT-LVL V: ICD-10-PCS | Mod: PBBFAC,,, | Performed by: ANESTHESIOLOGY

## 2019-02-07 RX ORDER — FUROSEMIDE 20 MG/1
20 TABLET ORAL DAILY
Qty: 90 TABLET | Refills: 1 | Status: SHIPPED | OUTPATIENT
Start: 2019-02-07 | End: 2019-08-06 | Stop reason: SDUPTHER

## 2019-02-07 RX ORDER — FUROSEMIDE 20 MG/1
20 TABLET ORAL 2 TIMES DAILY
COMMUNITY
End: 2019-02-22

## 2019-02-07 NOTE — TELEPHONE ENCOUNTER
----- Message from Randi Chucky sent at 2/7/2019 11:23 AM CST -----  Type:  RX Refill Request    Who Called:  Sveta Kaba or New Rx:  refill  RX Name and Strength:  furosemide (LASIX) 20 MG tablet  How is the patient currently taking it? (ex. 1XDay):  Take once daily  Is this a 30 day or 90 day RX:  90  Preferred Pharmacy with phone number:    [facility] - [address] [address]  Phone: [phone] Fax: [phone]  Local or Mail Order:  mail  Ordering Provider:  Dr Cristi Licea Call Back Number:  [phone]  Additional Information:  Thank you!

## 2019-02-07 NOTE — PROGRESS NOTES
Chronic Pain - Established    INTERVAL HISTORY (02/07/2019):    Sveta العراقي presents to the clinic today for a follow-up appointment for low back and right leg pain. Since the last visit, the right leg pain has been improving. The patient reports 100% improvement of her right leg pain after Repeat Right L4/5 and L5/S1 Transforaminal epidural steroid injection which continues. However, she continues to experience a significant amount of pain in her low back. The back pain is located in the bilateral lumbar paraspinal area. There is no radiation of the pain. Current pain intensity is 9/10. She describes the back pain as constant and sharp. The pain is made worse with prolonged standing and bending. She also complains of a constant pain in the posterior cervical spine which is made worse with activity. The back pain is more significant than the neck pain. At the last visit a referral was placed for physical therapy.  The patient reports she was never contacted to be set up for evaluation.       INTERVAL HISTORY (10/18/2018):    Sveta العراقي presents to the clinic today for a follow-up appointment for low back pain. The patient reports 80% pain relief after Right Transforaminal epidural steroid injection at L4/5 and L5/S1 which lasted 11 days. She reports the pain in her low back and right leg returned after changing the sheets in her bed on October 8th. Current pain intensity is 9/10. The pain is located in the right lumbar paraspinal area and radiates down the lateral aspect of the leg into the foot. She has not started physical therapy. She is concerned about transportation to .    Patient denies bowel/bladder incontinence, significant motor weakness and loss of sensations.     SUBJECTIVE:    Sveta العراقي is a 69 y/o female with hx of a-fib and TIA on Xarelto and Plavix who presents to the clinic for the evaluation of lower back pain. The pain started in July 2018 and symptoms have been persistent. No  recent trauma or inciting event. She reports a history of chronic low back problems that have been on and off for years. The pain is located in the bilateral lower back area and radiates down the lateral aspect of the right leg stopping in the calf.  The pain is described as aching, burning, sharp and stabbing and is rated as 9/10. The pain is rated with a score of  7/10 on the BEST day and a score of 10/10 on the WORST day.  Symptoms interfere with daily activity and sleeping. The pain is exacerbated by Standing, Walking and Getting out of bed/chair.  The pain is mitigated by heat and rest.  The patient reports 3 hours of uninterrupted sleep per night.    Patient denies bowel/bladder incontinence, significant motor weakness and loss of sensations.    Physical Therapy/Home Exercise: yes      Pain Disability Index Review:  Last 3 PDI Scores 2/7/2019 10/18/2018 9/6/2018   Pain Disability Index (PDI) 43 55 50       Pain Medications:    - Opioids: Ultram (Tramadol HCL) 50 mg daily as needed  - Gabapentin 300 mg TID  - Flexeril 10 mg q8 hours as needed    Anti-coagulants:    - Xarelto 20 mg daily  - Plavix 75 mg daily     report: Reviewed    Pain Procedures:   Right Transforaminal epidural steroid injection at L4/5 and L5/S1 (1/03/2019)  Right Transforaminal epidural steroid injection at L4/5 and L5/S1 (9/27/2018)  Epidural steroid injections around 2011 in [place]    Imaging:     Cervical MRI (10/29/2018):    FINDINGS:  Alignment: Normal.    Vertebrae: Normal marrow signal. No fracture.    Skull base and craniocervical junction: Normal.    C2-C3: No abnormality.    C3-C4: No abnormality.    C4-C5: Slight disc bulge, anterior cord contact slight deformity no change in signal.  AP central canal diameter 11 mm.  Normal foramina.  Mild facet degeneration bilaterally.    C5-C6: Mild disc space narrowing and desiccation.  Broad-based irregular disc bulge, a contain protrusion maximum 2.3 mm thickness, slight cord contact  on the right and displacement no change in signal.  AP central canal diameter redo at 9 mm at the level of the maximum protrusion.  Normal foramina.  Mild facet degeneration.    C6-C7: Mild loss of disc height with disc desiccation.  Normal central canal foramina.  Mild facet degeneration.    C7-T1: No abnormality.    Lumbar MRI (8/16/2018):    FINDINGS:  No destructive bone lesions are identified.  The cord ends at L1-2 interspace.  Small focal areas of increased T2 signal in the kidneys are less numerous at least 1 is seen in the left kidney upper pole.  Consistent with cyst.    At T12-L1, L1-2, L2-3 normal disc, central canal, foramina and facets.    At L3-4 normal disc height with disc desiccation.  Redemonstration of broad-based disc herniation, a contained protrusion 3.4 mm thick, and posterior ligamentous hypertrophy.  AP central canal diameter 10 mm.  There is contact with the transiting L4 nerve roots bilaterally without definite displacement.  Normal foramina and facets.    At L4-5 normal disc height with disc desiccation.  Broad-based herniation, a contained protrusion 4.3 mm thickness, minimal ligament hypertrophy.  AP central canal diameter 11 mm.  Normal foramina.  Facet hypertrophy and degeneration bilaterally.    At L5-S1 normal disc height with disc desiccation.  Broad-based herniation a contain protrusion that is mildly asymmetrical across the central canal, maximum thickness to the right of the canal 5 mm, with displacement and mild compression of the transiting right S1 nerve root.  AP central canal diameter 11 mm.  The left foramen shows contact of the herniated disc with the exiting L5 nerve root displacing it cephalad, loss of perineural fat approximately 50%.  The right foramen appears to have adequate perineural fat.  Facet degeneration and hypertrophy bilaterally.    Normal sacroiliac joints.    Past Medical History:   Diagnosis Date    A-fib 2017    Abnormal stress test 2017    Asthma      Chest pain 2018    Cholelithiasis     Chronic back pain     Chronic neck pain     Depression     GERD (gastroesophageal reflux disease)     Hernia     Hyperlipidemia 2017    Hypertension     Osteoarthritis     knee and back pain    Osteoporosis     Pneumonia     history    PTSD (post-traumatic stress disorder)     Pulmonary embolism 1999    post fall    Sleep apnea     could not tolerate c pap    TIA (transient ischemic attack) 2017    no residual     Past Surgical History:   Procedure Laterality Date    ABDOMINAL SURGERY      bile duct repair x2    APPENDECTOMY      CARDIAC CATHETERIZATION  01/26/2018    CHOLECYSTECTOMY      EYE SURGERY      cataracts    HERNIA REPAIR      HIP ARTHROSCOPY W/ LABRAL REPAIR      right    HYSTERECTOMY      Injection,steroid,epidural,transforaminal approach RIGHT L4/5 AND L5/S1 LUMBAR Right 9/27/2018    Performed [facility] R[facility] R[facility] III, [facility] at [facility]    Injection,steroid,epidural,transforaminal approach---right L4/5, L5/S1 Right 1/3/2019    Per[facility] at [facility] OR    Left heart cath Left 1/26/2018    Performed by Nadeem Massey MD at [facility] CATH LAB    Loop insertion Left 11/8/2017    [facility] C[facility]    loop recorder  11/2017    Right heart cath Right 1/26/2018    Per[facility] CATH LAB    ROTATOR CUFF REPAIR      CHARISSA During Cath Case N/A 1/26/2018    Performed [facility] at [facility] C[facility]    TOTAL KNEE ARTHROPLASTY      bilaterally     Social History     Socioeconomic History    Marital status:      Spouse name: Not on file    Number of children: Not on file    Years of education: Not on file    Highest education level: Not on file   Social Needs    Financial resource strain: Not on file    Food insecurity - worry: Not on file    Food insecurity - inability: Not on file    Transportation needs - medical: Not on file     Transportation needs - non-medical: Not on file   Occupational History    Not on file   Tobacco Use    Smoking status: Never Smoker    Smokeless tobacco: Never Used   Substance and Sexual Activity    Alcohol use: Yes     Comment: socially on rare occasions    Drug use: No    Sexual activity: Not Currently     Partners: Male   Other Topics Concern    Not on file   Social History Narrative    Not on file     Family History   Problem Relation Age of Onset    Diabetes Mother     Cancer Mother     Stroke Mother     Heart disease Father     Cancer Sister     Stroke Brother        Review of patient's allergies indicates:   Allergen Reactions    Baclofen Hives    Pamelor [nortriptyline] Palpitations       Current Outpatient Medications   Medication Sig    albuterol (PROVENTIL/VENTOLIN HFA) 90 mcg/actuation inhaler Inhale 2 puffs into the lungs every 6 (six) hours as needed. Rescue    alendronate (FOSAMAX) 35 MG tablet Take 1 tablet (35 mg total) by mouth every 7 days.    amLODIPine (NORVASC) 5 MG tablet Take 5 mg by mouth once daily.    atorvastatin (LIPITOR) 20 MG tablet Take 1 tablet by mouth once daily.    calcium carbonate-vit D3-min 600 mg calcium- 400 unit Tab Take 1 tablet by mouth 2 (two) times daily.    clopidogrel (PLAVIX) 75 mg tablet Take 75 mg by mouth once daily.    furosemide (LASIX) 20 MG tablet Take 20 mg by mouth 2 (two) times daily.    gabapentin (NEURONTIN) 300 MG capsule Take 1 capsule (300 mg total) by mouth 3 (three) times daily.    ketorolac (TORADOL) 10 mg tablet Take 1 tablet (10 mg total) by mouth every 6 (six) hours as needed for Pain.    lidocaine-prilocaine (EMLA) cream Apply topically 3 (three) times daily as needed.    metoprolol succinate (TOPROL-XL) 25 MG 24 hr tablet Take 25 mg by mouth once daily.    montelukast (SINGULAIR) 10 mg tablet TAKE 1 TABLET ONCE DAILY IN THE EVENING    potassium chloride (KLOR-CON) 8 MEQ TbSR Take 1 tablet (8 mEq total) by mouth once  "daily.    primidone (MYSOLINE) 50 MG Tab Take 50 mg by mouth every evening.    promethazine (PHENERGAN) 25 MG tablet Take 1 tablet (25 mg total) by mouth every 6 (six) hours as needed for Nausea.    rivaroxaban (XARELTO) 20 mg Tab Take 20 mg by mouth once daily.    sertraline (ZOLOFT) 100 MG tablet TAKE 1 TABLET DAILY    traZODone (DESYREL) 100 MG tablet TAKE 1 TABLET DAILY AT BEDTIME    furosemide (LASIX) 20 MG tablet Take 1 tablet (20 mg total) by mouth once daily.    traMADol (ULTRAM) 50 mg tablet Take 1 tablet (50 mg total) by mouth daily as needed for Pain.     No current facility-administered medications for this visit.        REVIEW OF SYSTEMS:    GENERAL:  No weight loss or fevers.  HEENT:  Negative for frequent or significant headaches.  NECK:  + neck pain  RESPIRATORY:  Negative for wheezing or shortness of breath.  CARDIOVASCULAR:  Negative for chest pain or palpitations.  GI:  Negative for abdominal discomfort, blood in stools or black stools or change in bowel habits.  MUSCULOSKELETAL:  See HPI.  SKIN:  Negative for lesions, rash, and itching.  PSYCH:  + sleep disturbance + hx of PTSD and anxiety  HEMATOLOGY/LYMPHOLOGY:    Takes Xarelto and Plavix.  NEURO:   No history of seizures or tremors. Hx of TIA  All other reviewed and negative other than HPI.    OBJECTIVE:    /64   Pulse 72   Ht 5' 11" (1.803 m)   Wt 100 kg (220 lb 5.6 oz)   BMI 30.73 kg/m²     PHYSICAL EXAMINATION:    General appearance: Well appearing, in no acute distress, alert and oriented x3. Obese.  Psych:  Mood and affect appropriate.  Skin: Skin color, texture, turgor normal, no rashes or lesions, in both upper and lower body.  Head/face:  Normocephalic, atraumatic.   Neck: Tenderness to palpation over the lower cervical paraspinous muscles.  No pain with neck flexion, extension, or lateral flexion.   Cor: RRR  Pulm: Breathing unlabored.  GI:  Soft and non-tender.  Back: Straight leg raising is negative to radicular " pain. Diffuse tenderness to palpation over the lumbar spine and paraspinous muscles out of proportion to exam. + pain with back extension. Decreased range of motion on extension/flexion.  Extremities: Peripheral joint ROM is full and pain free without obvious instability or laxity in all four extremities. No deformities, edema, or skin discoloration.  Musculoskeletal: Bilateral upper and lower extremity strength is normal and symmetric.  No atrophy or tone abnormalities are noted.  Neuro: No loss of sensation is noted.  Gait: Antalgic, uses cane.    ASSESSMENT: 67 y/o female with chronic axial low back pain consisetnt with facet arthropathy. Also a likely component of myofascial pain. Right lower extremity pain improved after Right L4/5 and L5/S1 TESI.    1. Lumbar spondylosis    2. DDD (degenerative disc disease), lumbar    3. Osteoarthritis of cervical spine, unspecified spinal osteoarthritis complication status    4. DDD (degenerative disc disease), cervical    5. PTSD (post-traumatic stress disorder)    6. Chronic pain syndrome        PLAN:      - I have stressed the importance of physical activity and a home exercise plan to help with pain and improve health.  - New referral placed for physical therapy for modalities, lumbar stabilization, range of motion exercises and HEP.   - Referral placed to Dr. Newton to help with depression and anxiety.  - Schedule for Bilateral L4/5 and L5/S1 medial branch blocks. If diagnostic will plan for RFA.  - Counseled patient regarding the importance of physical therapy and weight loss.  - RTC after procedure.    The above plan and management options were discussed at length with patient. Patient is in agreement with the above and verbalized understanding. It will be communicated with the referring physician via electronic record, fax, or mail.    Rony Riddle III  02/07/2019

## 2019-02-11 ENCOUNTER — TELEPHONE (OUTPATIENT)
Dept: PRIMARY CARE CLINIC | Facility: CLINIC | Age: 69
End: 2019-02-11

## 2019-02-11 DIAGNOSIS — S03.00XA DISLOCATION OF TEMPOROMANDIBULAR JOINT, INITIAL ENCOUNTER: Primary | ICD-10-CM

## 2019-02-11 NOTE — TELEPHONE ENCOUNTER
----- Message from Karina Lyons sent at 2/11/2019 10:54 AM CST -----  Type: Needs referral sent    Who Called:  Patient  Best Call Back Number: [phone]  Additional Information: Patient needs referral sent to [facility] at fax number [phone]/please include reason patient is being seen and contact number/if any questions call back.

## 2019-02-14 PROBLEM — M47.816 LUMBAR SPONDYLOSIS: Status: ACTIVE | Noted: 2019-02-14

## 2019-02-19 ENCOUNTER — TELEPHONE (OUTPATIENT)
Dept: PRIMARY CARE CLINIC | Facility: CLINIC | Age: 69
End: 2019-02-19

## 2019-02-19 NOTE — TELEPHONE ENCOUNTER
----- Message from Kym Yi sent at 2/19/2019  3:37 PM CST -----  Contact: self  Patient need to speak to nurse regarding she need a referral sent [facility] [phone](fax)      Please call patient to advice [phone]

## 2019-02-19 NOTE — TELEPHONE ENCOUNTER
Tried calling patient to let her know that I faxed the referral on 2/11 but I faxed it again, she did not answer. Message left for her to call me if she has any issues.

## 2019-02-22 ENCOUNTER — OFFICE VISIT (OUTPATIENT)
Dept: PRIMARY CARE CLINIC | Facility: CLINIC | Age: 69
End: 2019-02-22
Payer: MEDICARE

## 2019-02-22 VITALS
HEART RATE: 59 BPM | DIASTOLIC BLOOD PRESSURE: 69 MMHG | TEMPERATURE: 98 F | WEIGHT: 260 LBS | SYSTOLIC BLOOD PRESSURE: 114 MMHG | BODY MASS INDEX: 36.4 KG/M2 | HEIGHT: 71 IN | RESPIRATION RATE: 18 BRPM | OXYGEN SATURATION: 99 %

## 2019-02-22 DIAGNOSIS — M51.36 DDD (DEGENERATIVE DISC DISEASE), LUMBAR: ICD-10-CM

## 2019-02-22 DIAGNOSIS — M54.16 LUMBAR RADICULOPATHY: ICD-10-CM

## 2019-02-22 DIAGNOSIS — R06.09 EXERTIONAL DYSPNEA: ICD-10-CM

## 2019-02-22 DIAGNOSIS — R60.9 EDEMA, UNSPECIFIED TYPE: Primary | ICD-10-CM

## 2019-02-22 PROCEDURE — 99999 PR PBB SHADOW E&M-EST. PATIENT-LVL III: CPT | Mod: PBBFAC,,, | Performed by: FAMILY MEDICINE

## 2019-02-22 PROCEDURE — 99999 PR PBB SHADOW E&M-EST. PATIENT-LVL III: ICD-10-PCS | Mod: PBBFAC,,, | Performed by: FAMILY MEDICINE

## 2019-02-22 PROCEDURE — 99214 OFFICE O/P EST MOD 30 MIN: CPT | Mod: S$PBB,,, | Performed by: FAMILY MEDICINE

## 2019-02-22 PROCEDURE — 93010 EKG 12-LEAD: ICD-10-PCS | Mod: S$PBB,,, | Performed by: INTERNAL MEDICINE

## 2019-02-22 PROCEDURE — 99214 PR OFFICE/OUTPT VISIT, EST, LEVL IV, 30-39 MIN: ICD-10-PCS | Mod: S$PBB,,, | Performed by: FAMILY MEDICINE

## 2019-02-22 PROCEDURE — 99213 OFFICE O/P EST LOW 20 MIN: CPT | Mod: PBBFAC,PN | Performed by: FAMILY MEDICINE

## 2019-02-22 PROCEDURE — 93005 ELECTROCARDIOGRAM TRACING: CPT | Mod: PBBFAC,PN | Performed by: INTERNAL MEDICINE

## 2019-02-22 PROCEDURE — 93010 ELECTROCARDIOGRAM REPORT: CPT | Mod: S$PBB,,, | Performed by: INTERNAL MEDICINE

## 2019-02-22 RX ORDER — TRAMADOL HYDROCHLORIDE 50 MG/1
50 TABLET ORAL DAILY PRN
Qty: 30 TABLET | Refills: 1 | Status: SHIPPED | OUTPATIENT
Start: 2019-02-22 | End: 2019-03-22 | Stop reason: ALTCHOICE

## 2019-02-22 NOTE — PROGRESS NOTES
"Subjective:       Patient ID: Sveta العراقي is a 68 y.o. female.    Chief Complaint: Fluid Retention    Complains of increasing swelling in her feet and hands for the past week or so, slightly more dyspneic with exertion than usual.  Mild orthopnea.  Denies chest pains.  Occasional palpitations.  No recent dietary changes, taking medications consistently.      Review of Systems   Constitutional: Positive for fatigue. Negative for fever.   HENT: Negative for trouble swallowing.    Respiratory: Positive for shortness of breath. Negative for wheezing.    Cardiovascular: Positive for palpitations and leg swelling. Negative for chest pain.   Gastrointestinal: Negative for nausea and vomiting.   Genitourinary: Negative for difficulty urinating.   Skin: Negative for rash and wound.   Neurological: Negative for dizziness and light-headedness.   Hematological: Bruises/bleeds easily.   Psychiatric/Behavioral: Negative for agitation and behavioral problems.       Objective:      Vitals:    02/22/19 1442   BP: 114/69   BP Location: Left arm   Patient Position: Sitting   BP Method: Large (Automatic)   Pulse: (!) 59   Resp: 18   Temp: 97.7 °F (36.5 °C)   TempSrc: Oral   SpO2: 99%   Weight: 117.9 kg (260 lb)   Height: 5' 11" (1.803 m)     Physical Exam   Constitutional: She is oriented to person, place, and time. She appears well-developed and well-nourished.   HENT:   Head: Normocephalic and atraumatic.   Neck: No JVD present.   Cardiovascular: Normal rate, regular rhythm and normal heart sounds.   Pulmonary/Chest: Effort normal and breath sounds normal.   Abdominal: Soft. There is no tenderness.   Musculoskeletal: She exhibits edema (1+).   Neurological: She is alert and oriented to person, place, and time.   Skin: Skin is warm and dry.   Psychiatric: She has a normal mood and affect. Her behavior is normal.   Nursing note and vitals reviewed.      Assessment:       1. Edema, unspecified type    2. DDD (degenerative disc " disease), lumbar    3. Lumbar radiculopathy    4. Exertional dyspnea        Plan:       Edema, unspecified type  -     EKG 12-lead  -     X-Ray Chest PA And Lateral; Future; Expected date: 02/22/2019  -     CBC auto differential; Future; Expected date: 02/22/2019  -     Basic metabolic panel; Future; Expected date: 02/22/2019  -     Troponin I; Future; Expected date: 02/22/2019  -     Brain natriuretic peptide; Future; Expected date: 02/22/2019  Labs all normal, chest x-ray clear, normal sinus rhythm on EKG.  No evidence of CHF.  Advised patient to double Lasix for the next 2-3 days.  Contact her cardiologist if symptoms persist.  DDD (degenerative disc disease), lumbar  -     traMADol (ULTRAM) 50 mg tablet; Take 1 tablet (50 mg total) by mouth daily as needed for Pain.  Dispense: 30 tablet; Refill: 1    Lumbar radiculopathy  -     traMADol (ULTRAM) 50 mg tablet; Take 1 tablet (50 mg total) by mouth daily as needed for Pain.  Dispense: 30 tablet; Refill: 1    Exertional dyspnea  -     EKG 12-lead  -     X-Ray Chest PA And Lateral; Future; Expected date: 02/22/2019  -     CBC auto differential; Future; Expected date: 02/22/2019  -     Basic metabolic panel; Future; Expected date: 02/22/2019  -     Troponin I; Future; Expected date: 02/22/2019  -     Brain natriuretic peptide; Future; Expected date: 02/22/2019      Medication List with Changes/Refills   Current Medications    ALBUTEROL (PROVENTIL/VENTOLIN HFA) 90 MCG/ACTUATION INHALER    Inhale 2 puffs into the lungs every 6 (six) hours as needed. Rescue    ALENDRONATE (FOSAMAX) 35 MG TABLET    Take 1 tablet (35 mg total) by mouth every 7 days.    AMLODIPINE (NORVASC) 5 MG TABLET    Take 5 mg by mouth once daily.    ATORVASTATIN (LIPITOR) 20 MG TABLET    Take 1 tablet by mouth once daily.    CALCIUM CARBONATE-VIT D3- MG CALCIUM- 400 UNIT TAB    Take 1 tablet by mouth 2 (two) times daily.    CLOPIDOGREL (PLAVIX) 75 MG TABLET    Take 75 mg by mouth once daily.     FUROSEMIDE (LASIX) 20 MG TABLET    Take 1 tablet (20 mg total) by mouth once daily.    GABAPENTIN (NEURONTIN) 300 MG CAPSULE    Take 1 capsule (300 mg total) by mouth 3 (three) times daily.    LIDOCAINE-PRILOCAINE (EMLA) CREAM    Apply topically 3 (three) times daily as needed.    METOPROLOL SUCCINATE (TOPROL-XL) 25 MG 24 HR TABLET    Take 25 mg by mouth once daily.    MONTELUKAST (SINGULAIR) 10 MG TABLET    TAKE 1 TABLET ONCE DAILY IN THE EVENING    POTASSIUM CHLORIDE (KLOR-CON) 8 MEQ TBSR    Take 1 tablet (8 mEq total) by mouth once daily.    PRIMIDONE (MYSOLINE) 50 MG TAB    Take 50 mg by mouth every evening.    PROMETHAZINE (PHENERGAN) 25 MG TABLET    Take 1 tablet (25 mg total) by mouth every 6 (six) hours as needed for Nausea.    RIVAROXABAN (XARELTO) 20 MG TAB    Take 20 mg by mouth once daily.    SERTRALINE (ZOLOFT) 100 MG TABLET    TAKE 1 TABLET DAILY    TRAZODONE (DESYREL) 100 MG TABLET    TAKE 1 TABLET DAILY AT BEDTIME   Changed and/or Refilled Medications    Modified Medication Previous Medication    TRAMADOL (ULTRAM) 50 MG TABLET traMADol (ULTRAM) 50 mg tablet       Take 1 tablet (50 mg total) by mouth daily as needed for Pain.    Take 1 tablet (50 mg total) by mouth daily as needed for Pain.   Discontinued Medications    FUROSEMIDE (LASIX) 20 MG TABLET    Take 20 mg by mouth 2 (two) times daily.    KETOROLAC (TORADOL) 10 MG TABLET    Take 1 tablet (10 mg total) by mouth every 6 (six) hours as needed for Pain.

## 2019-02-28 ENCOUNTER — OFFICE VISIT (OUTPATIENT)
Dept: PAIN MEDICINE | Facility: CLINIC | Age: 69
End: 2019-02-28
Attending: ANESTHESIOLOGY
Payer: MEDICARE

## 2019-02-28 VITALS
WEIGHT: 258.38 LBS | DIASTOLIC BLOOD PRESSURE: 71 MMHG | HEIGHT: 71 IN | BODY MASS INDEX: 36.17 KG/M2 | SYSTOLIC BLOOD PRESSURE: 128 MMHG | HEART RATE: 60 BPM

## 2019-02-28 DIAGNOSIS — M51.36 DDD (DEGENERATIVE DISC DISEASE), LUMBAR: ICD-10-CM

## 2019-02-28 DIAGNOSIS — G89.4 CHRONIC PAIN SYNDROME: ICD-10-CM

## 2019-02-28 DIAGNOSIS — E66.9 OBESITY WITH BODY MASS INDEX OF 30.0-39.9: ICD-10-CM

## 2019-02-28 DIAGNOSIS — M47.816 LUMBAR SPONDYLOSIS: Primary | ICD-10-CM

## 2019-02-28 PROCEDURE — 99213 PR OFFICE/OUTPT VISIT, EST, LEVL III, 20-29 MIN: ICD-10-PCS | Mod: S$PBB,,, | Performed by: ANESTHESIOLOGY

## 2019-02-28 PROCEDURE — 99999 PR PBB SHADOW E&M-EST. PATIENT-LVL IV: CPT | Mod: PBBFAC,,, | Performed by: ANESTHESIOLOGY

## 2019-02-28 PROCEDURE — 99214 OFFICE O/P EST MOD 30 MIN: CPT | Mod: PBBFAC,PN | Performed by: ANESTHESIOLOGY

## 2019-02-28 PROCEDURE — 99213 OFFICE O/P EST LOW 20 MIN: CPT | Mod: S$PBB,,, | Performed by: ANESTHESIOLOGY

## 2019-02-28 PROCEDURE — 99999 PR PBB SHADOW E&M-EST. PATIENT-LVL IV: ICD-10-PCS | Mod: PBBFAC,,, | Performed by: ANESTHESIOLOGY

## 2019-02-28 RX ORDER — METHOCARBAMOL 750 MG/1
750 TABLET, FILM COATED ORAL DAILY PRN
COMMUNITY
Start: 2019-02-25 | End: 2020-07-29

## 2019-02-28 NOTE — PROGRESS NOTES
Chronic Pain - Established    INTERVAL HISTORY (02/28/2019):    Sveta العراقي presents to the clinic today for a follow-up appointment for low back pain. Since the last visit, the pain has been persistent. The patient reports 95% pain relief after Bilateral L4/5 and L5/S1 medial branch blocks which lasted 72 hours. Current pain intensity is 8/10. The pain is located in the bilateral lumbar paraspinal area. The pain is made worse with standing, activity, and bending.      INTERVAL HISTORY (02/07/2019):    Sveta العراقي presents to the clinic today for a follow-up appointment for low back and right leg pain. Since the last visit, the right leg pain has been improving. The patient reports 100% improvement of her right leg pain after Repeat Right L4/5 and L5/S1 Transforaminal epidural steroid injection which continues. However, she continues to experience a significant amount of pain in her low back. The back pain is located in the bilateral lumbar paraspinal area. There is no radiation of the pain. Current pain intensity is 9/10. She describes the back pain as constant and sharp. The pain is made worse with prolonged standing and bending. She also complains of a constant pain in the posterior cervical spine which is made worse with activity. The back pain is more significant than the neck pain. At the last visit a referral was placed for physical therapy.  The patient reports she was never contacted to be set up for evaluation.       INTERVAL HISTORY (10/18/2018):    Sveta العراقي presents to the clinic today for a follow-up appointment for low back pain. The patient reports 80% pain relief after Right Transforaminal epidural steroid injection at L4/5 and L5/S1 which lasted 11 days. She reports the pain in her low back and right leg returned after changing the sheets in her bed on October 8th. Current pain intensity is 9/10. The pain is located in the right lumbar paraspinal area and radiates down the lateral aspect  of the leg into the foot. She has not started physical therapy. She is concerned about transportation to .    Patient denies bowel/bladder incontinence, significant motor weakness and loss of sensations.     SUBJECTIVE:    Sveta العراقي is a 69 y/o female with hx of a-fib and TIA on Xarelto and Plavix who presents to the clinic for the evaluation of lower back pain. The pain started in July 2018 and symptoms have been persistent. No recent trauma or inciting event. She reports a history of chronic low back problems that have been on and off for years. The pain is located in the bilateral lower back area and radiates down the lateral aspect of the right leg stopping in the calf.  The pain is described as aching, burning, sharp and stabbing and is rated as 9/10. The pain is rated with a score of  7/10 on the BEST day and a score of 10/10 on the WORST day.  Symptoms interfere with daily activity and sleeping. The pain is exacerbated by Standing, Walking and Getting out of bed/chair.  The pain is mitigated by heat and rest.  The patient reports 3 hours of uninterrupted sleep per night.    Patient denies bowel/bladder incontinence, significant motor weakness and loss of sensations.    Physical Therapy/Home Exercise: yes      Pain Disability Index Review:  Last 3 PDI Scores 2/28/2019 2/7/2019 10/18/2018   Pain Disability Index (PDI) 30 43 55       Pain Medications:    - Opioids: Ultram (Tramadol HCL) 50 mg daily as needed  - Gabapentin 300 mg TID  - Flexeril 10 mg q8 hours as needed    Anti-coagulants:    - Xarelto 20 mg daily  - Plavix 75 mg daily     report: Reviewed    Pain Procedures:   Bilateral L4/5 and L5/S1 medial branch blocks (2/14/2019)  Right Transforaminal epidural steroid injection at L4/5 and L5/S1 (1/03/2019)  Right Transforaminal epidural steroid injection at L4/5 and L5/S1 (9/27/2018)  Epidural steroid injections around 2011 in [place]    Imaging:     Cervical MRI  (10/29/2018):    FINDINGS:  Alignment: Normal.    Vertebrae: Normal marrow signal. No fracture.    Skull base and craniocervical junction: Normal.    C2-C3: No abnormality.    C3-C4: No abnormality.    C4-C5: Slight disc bulge, anterior cord contact slight deformity no change in signal.  AP central canal diameter 11 mm.  Normal foramina.  Mild facet degeneration bilaterally.    C5-C6: Mild disc space narrowing and desiccation.  Broad-based irregular disc bulge, a contain protrusion maximum 2.3 mm thickness, slight cord contact on the right and displacement no change in signal.  AP central canal diameter redo at 9 mm at the level of the maximum protrusion.  Normal foramina.  Mild facet degeneration.    C6-C7: Mild loss of disc height with disc desiccation.  Normal central canal foramina.  Mild facet degeneration.    C7-T1: No abnormality.    Lumbar MRI (8/16/2018):    FINDINGS:  No destructive bone lesions are identified.  The cord ends at L1-2 interspace.  Small focal areas of increased T2 signal in the kidneys are less numerous at least 1 is seen in the left kidney upper pole.  Consistent with cyst.    At T12-L1, L1-2, L2-3 normal disc, central canal, foramina and facets.    At L3-4 normal disc height with disc desiccation.  Redemonstration of broad-based disc herniation, a contained protrusion 3.4 mm thick, and posterior ligamentous hypertrophy.  AP central canal diameter 10 mm.  There is contact with the transiting L4 nerve roots bilaterally without definite displacement.  Normal foramina and facets.    At L4-5 normal disc height with disc desiccation.  Broad-based herniation, a contained protrusion 4.3 mm thickness, minimal ligament hypertrophy.  AP central canal diameter 11 mm.  Normal foramina.  Facet hypertrophy and degeneration bilaterally.    At L5-S1 normal disc height with disc desiccation.  Broad-based herniation a contain protrusion that is mildly asymmetrical across the central canal, maximum thickness  to the right of the canal 5 mm, with displacement and mild compression of the transiting right S1 nerve root.  AP central canal diameter 11 mm.  The left foramen shows contact of the herniated disc with the exiting L5 nerve root displacing it cephalad, loss of perineural fat approximately 50%.  The right foramen appears to have adequate perineural fat.  Facet degeneration and hypertrophy bilaterally.    Normal sacroiliac joints.    Past Medical History:   Diagnosis Date    A-fib 2017    Abnormal stress test 2017    Asthma     Chest pain 2018    Cholelithiasis     Chronic back pain     Chronic neck pain     Depression     GERD (gastroesophageal reflux disease)     Hernia     Hyperlipidemia 2017    Hypertension     Osteoarthritis     knee and back pain    Osteoporosis     Pneumonia     history    PTSD (post-traumatic stress disorder)     Pulmonary embolism 1999    post fall    Sleep apnea     could not tolerate c pap    TIA (transient ischemic attack) 2017    no residual     Past Surgical History:   Procedure Laterality Date    ABDOMINAL SURGERY      bile duct repair x2    APPENDECTOMY      BLOCK, NERVE, FACET JOINT, LUMBAR, MEDIAL BRANCH-kash MBB L4/5,L5/S1 Bilateral 2/14/2019    [facility]    CARDIAC CATHETERIZATION  01/26/2018    CHOLECYSTECTOMY      EYE SURGERY      cataracts    HERNIA REPAIR      HIP ARTHROSCOPY W/ LABRAL REPAIR      right    HYSTERECTOMY      Injection,steroid,epidural,transforaminal approach RIGHT L4/5 AND L5/S1 LUMBAR Right 9/27/2018    [facility]    Injection,steroid,epidural,transforaminal approach---right L4/5, L5/S1 Right 1/3/2019    [facility] OR    Left heart cath Left 1/26/2018    Performed by [facility]adeem Massey MD at [facility] CATH LAB    Loop insertion Left 11/8/2017    [facility] CATH LAB    loop  recorder  11/2017    Right heart cath Right 1/26/2018    [facility] at [facility] CATH LAB    ROTATOR CUFF REPAIR      CHARISSA During Cath Case N/A 1/26/2018    [facility] CATH LAB    TOTAL KNEE ARTHROPLASTY      bilaterally     Social History     Socioeconomic History    Marital status:      Spouse name: Not on file    Number of children: Not on file    Years of education: Not on file    Highest education level: Not on file   Social Needs    Financial resource strain: Not on file    Food insecurity - worry: Not on file    Food insecurity - inability: Not on file    Transportation needs - medical: Not on file    Transportation needs - non-medical: Not on file   Occupational History    Not on file   Tobacco Use    Smoking status: Never Smoker    Smokeless tobacco: Never Used   Substance and Sexual Activity    Alcohol use: Yes     Comment: socially on rare occasions    Drug use: No    Sexual activity: Not Currently     Partners: Male   Other Topics Concern    Not on file   Social History Narrative    Not on file     Family History   Problem Relation Age of Onset    Diabetes Mother     Cancer Mother     Stroke Mother     Heart disease Father     Cancer Sister     Stroke Brother        Review of patient's allergies indicates:   Allergen Reactions    Baclofen Hives    Pamelor [nortriptyline] Palpitations       Current Outpatient Medications   Medication Sig    albuterol (PROVENTIL/VENTOLIN HFA) 90 mcg/actuation inhaler Inhale 2 puffs into the lungs every 6 (six) hours as needed. Rescue    alendronate (FOSAMAX) 35 MG tablet Take 1 tablet (35 mg total) by mouth every 7 days.    amLODIPine (NORVASC) 5 MG tablet Take 5 mg by mouth once daily.    atorvastatin (LIPITOR) 20 MG tablet Take 1 tablet by mouth once daily.    calcium carbonate-vit D3-min 600 mg calcium- 400 unit Tab Take 1 tablet by mouth 2 (two) times daily.    clopidogrel (PLAVIX)  "75 mg tablet Take 75 mg by mouth once daily.    furosemide (LASIX) 20 MG tablet Take 1 tablet (20 mg total) by mouth once daily.    gabapentin (NEURONTIN) 300 MG capsule Take 1 capsule (300 mg total) by mouth 3 (three) times daily.    lidocaine-prilocaine (EMLA) cream Apply topically 3 (three) times daily as needed.    metoprolol succinate (TOPROL-XL) 25 MG 24 hr tablet Take 25 mg by mouth once daily.    montelukast (SINGULAIR) 10 mg tablet TAKE 1 TABLET ONCE DAILY IN THE EVENING    potassium chloride (KLOR-CON) 8 MEQ TbSR Take 1 tablet (8 mEq total) by mouth once daily.    primidone (MYSOLINE) 50 MG Tab Take 50 mg by mouth every evening.    promethazine (PHENERGAN) 25 MG tablet Take 1 tablet (25 mg total) by mouth every 6 (six) hours as needed for Nausea.    rivaroxaban (XARELTO) 20 mg Tab Take 20 mg by mouth once daily.    sertraline (ZOLOFT) 100 MG tablet TAKE 1 TABLET DAILY    traMADol (ULTRAM) 50 mg tablet Take 1 tablet (50 mg total) by mouth daily as needed for Pain.    traZODone (DESYREL) 100 MG tablet TAKE 1 TABLET DAILY AT BEDTIME    methocarbamol (ROBAXIN) 750 MG Tab      No current facility-administered medications for this visit.        REVIEW OF SYSTEMS:    GENERAL:  No weight loss or fevers.  HEENT:  Negative for frequent or significant headaches.  NECK:  + neck pain  RESPIRATORY:  Negative for wheezing or shortness of breath.  CARDIOVASCULAR:  Negative for chest pain or palpitations.  GI:  Negative for abdominal discomfort, blood in stools or black stools or change in bowel habits.  MUSCULOSKELETAL:  See HPI.  SKIN:  Negative for lesions, rash, and itching.  PSYCH:  + sleep disturbance + hx of PTSD and anxiety  HEMATOLOGY/LYMPHOLOGY:    Takes Xarelto and Plavix.  NEURO:   No history of seizures or tremors. Hx of TIA  All other reviewed and negative other than HPI.    OBJECTIVE:    /71   Pulse 60   Ht 5' 11" (1.803 m)   Wt 117.2 kg (258 lb 6.1 oz)   BMI 36.04 kg/m²     PHYSICAL " EXAMINATION:    General appearance: Well appearing, in no acute distress, alert and oriented x3. Obese.  Psych:  Mood and affect appropriate.  Skin: Skin color, texture, turgor normal, no rashes or lesions, in both upper and lower body.  Head/face:  Normocephalic, atraumatic.   Cor: RRR  Pulm: Breathing unlabored.  GI:  Soft and non-tender.  Back: Straight leg raising is negative to radicular pain. Tenderness to palpation over the lumbar paraspinous muscles. + pain with back extension. Decreased range of motion on extension/flexion.  Extremities: Peripheral joint ROM is full and pain free without obvious instability or laxity in all four extremities. No deformities, edema, or skin discoloration.  Musculoskeletal: Bilateral upper and lower extremity strength is normal and symmetric.  No atrophy or tone abnormalities are noted.  Neuro: No loss of sensation is noted.  Gait: Antalgic, uses cane.    ASSESSMENT: 67 y/o female with chronic axial low back pain, consistent with facet arthropathy.     1. Lumbar spondylosis    2. DDD (degenerative disc disease), lumbar    3. Chronic pain syndrome    4. Obesity with body mass index of 30.0-39.9        PLAN:      - I have stressed the importance of physical activity and a home exercise plan to help with pain and improve health.  - Continue physical therapy for low back.   - Schedule for Left and Right L4/5 and L5/S1 medial branch RFA.  - RTC after procedure.    The above plan and management options were discussed at length with patient. Patient is in agreement with the above and verbalized understanding. It will be communicated with the referring physician via electronic record, fax, or mail.    Rony Riddle III  02/28/2019

## 2019-02-28 NOTE — H&P (VIEW-ONLY)
Chronic Pain - Established    INTERVAL HISTORY (02/28/2019):    Sveta العراقي presents to the clinic today for a follow-up appointment for low back pain. Since the last visit, the pain has been persistent. The patient reports 95% pain relief after Bilateral L4/5 and L5/S1 medial branch blocks which lasted 72 hours. Current pain intensity is 8/10. The pain is located in the bilateral lumbar paraspinal area. The pain is made worse with standing, activity, and bending.      INTERVAL HISTORY (02/07/2019):    Sveta العراقي presents to the clinic today for a follow-up appointment for low back and right leg pain. Since the last visit, the right leg pain has been improving. The patient reports 100% improvement of her right leg pain after Repeat Right L4/5 and L5/S1 Transforaminal epidural steroid injection which continues. However, she continues to experience a significant amount of pain in her low back. The back pain is located in the bilateral lumbar paraspinal area. There is no radiation of the pain. Current pain intensity is 9/10. She describes the back pain as constant and sharp. The pain is made worse with prolonged standing and bending. She also complains of a constant pain in the posterior cervical spine which is made worse with activity. The back pain is more significant than the neck pain. At the last visit a referral was placed for physical therapy.  The patient reports she was never contacted to be set up for evaluation.       INTERVAL HISTORY (10/18/2018):    Sveta العراقي presents to the clinic today for a follow-up appointment for low back pain. The patient reports 80% pain relief after Right Transforaminal epidural steroid injection at L4/5 and L5/S1 which lasted 11 days. She reports the pain in her low back and right leg returned after changing the sheets in her bed on October 8th. Current pain intensity is 9/10. The pain is located in the right lumbar paraspinal area and radiates down the lateral aspect  of the leg into the foot. She has not started physical therapy. She is concerned about transportation to .    Patient denies bowel/bladder incontinence, significant motor weakness and loss of sensations.     SUBJECTIVE:    Sveta العراقي is a 69 y/o female with hx of a-fib and TIA on Xarelto and Plavix who presents to the clinic for the evaluation of lower back pain. The pain started in July 2018 and symptoms have been persistent. No recent trauma or inciting event. She reports a history of chronic low back problems that have been on and off for years. The pain is located in the bilateral lower back area and radiates down the lateral aspect of the right leg stopping in the calf.  The pain is described as aching, burning, sharp and stabbing and is rated as 9/10. The pain is rated with a score of  7/10 on the BEST day and a score of 10/10 on the WORST day.  Symptoms interfere with daily activity and sleeping. The pain is exacerbated by Standing, Walking and Getting out of bed/chair.  The pain is mitigated by heat and rest.  The patient reports 3 hours of uninterrupted sleep per night.    Patient denies bowel/bladder incontinence, significant motor weakness and loss of sensations.    Physical Therapy/Home Exercise: yes      Pain Disability Index Review:  Last 3 PDI Scores 2/28/2019 2/7/2019 10/18/2018   Pain Disability Index (PDI) 30 43 55       Pain Medications:    - Opioids: Ultram (Tramadol HCL) 50 mg daily as needed  - Gabapentin 300 mg TID  - Flexeril 10 mg q8 hours as needed    Anti-coagulants:    - Xarelto 20 mg daily  - Plavix 75 mg daily     report: Reviewed    Pain Procedures:   Bilateral L4/5 and L5/S1 medial branch blocks (2/14/2019)  Right Transforaminal epidural steroid injection at L4/5 and L5/S1 (1/03/2019)  Right Transforaminal epidural steroid injection at L4/5 and L5/S1 (9/27/2018)  Epidural steroid injections around 2011 in [place]    Imaging:     Cervical MRI  (10/29/2018):    FINDINGS:  Alignment: Normal.    Vertebrae: Normal marrow signal. No fracture.    Skull base and craniocervical junction: Normal.    C2-C3: No abnormality.    C3-C4: No abnormality.    C4-C5: Slight disc bulge, anterior cord contact slight deformity no change in signal.  AP central canal diameter 11 mm.  Normal foramina.  Mild facet degeneration bilaterally.    C5-C6: Mild disc space narrowing and desiccation.  Broad-based irregular disc bulge, a contain protrusion maximum 2.3 mm thickness, slight cord contact on the right and displacement no change in signal.  AP central canal diameter redo at 9 mm at the level of the maximum protrusion.  Normal foramina.  Mild facet degeneration.    C6-C7: Mild loss of disc height with disc desiccation.  Normal central canal foramina.  Mild facet degeneration.    C7-T1: No abnormality.    Lumbar MRI (8/16/2018):    FINDINGS:  No destructive bone lesions are identified.  The cord ends at L1-2 interspace.  Small focal areas of increased T2 signal in the kidneys are less numerous at least 1 is seen in the left kidney upper pole.  Consistent with cyst.    At T12-L1, L1-2, L2-3 normal disc, central canal, foramina and facets.    At L3-4 normal disc height with disc desiccation.  Redemonstration of broad-based disc herniation, a contained protrusion 3.4 mm thick, and posterior ligamentous hypertrophy.  AP central canal diameter 10 mm.  There is contact with the transiting L4 nerve roots bilaterally without definite displacement.  Normal foramina and facets.    At L4-5 normal disc height with disc desiccation.  Broad-based herniation, a contained protrusion 4.3 mm thickness, minimal ligament hypertrophy.  AP central canal diameter 11 mm.  Normal foramina.  Facet hypertrophy and degeneration bilaterally.    At L5-S1 normal disc height with disc desiccation.  Broad-based herniation a contain protrusion that is mildly asymmetrical across the central canal, maximum thickness  to the right of the canal 5 mm, with displacement and mild compression of the transiting right S1 nerve root.  AP central canal diameter 11 mm.  The left foramen shows contact of the herniated disc with the exiting L5 nerve root displacing it cephalad, loss of perineural fat approximately 50%.  The right foramen appears to have adequate perineural fat.  Facet degeneration and hypertrophy bilaterally.    Normal sacroiliac joints.    Past Medical History:   Diagnosis Date    A-fib 2017    Abnormal stress test 2017    Asthma     Chest pain 2018    Cholelithiasis     Chronic back pain     Chronic neck pain     Depression     GERD (gastroesophageal reflux disease)     Hernia     Hyperlipidemia 2017    Hypertension     Osteoarthritis     knee and back pain    Osteoporosis     Pneumonia     history    PTSD (post-traumatic stress disorder)     Pulmonary embolism 1999    post fall    Sleep apnea     could not tolerate c pap    TIA (transient ischemic attack) 2017    no residual     Past Surgical History:   Procedure Laterality Date    ABDOMINAL SURGERY      bile duct repair x2    APPENDECTOMY      BLOCK, NERVE, FACET JOINT, LUMBAR, MEDIAL BRANCH-kash MBB L4/5,L5/S1 Bilateral 2/14/2019    Per[facility], [facility] at [facility]    CARDIAC CATHETERIZATION  01/26/2018    CHOLECYSTECTOMY      EYE SURGERY      cataracts    HERNIA REPAIR      HIP ARTHROSCOPY W/ LABRAL REPAIR      right    HYSTERECTOMY      Injection,steroid,epidural,transforaminal approach RIGHT L4/5 AND L5/S1 LUMBAR Right 9/27/2018    Performed by R[facility], [facility] at [facility] OR    Injection,steroid,epidural,transforaminal approach---right L4/5, L5/S1 Right 1/3/2019    Per[facility] at [facility] OR    Left heart cath Left 1/26/2018    Performed by Nadeem Massey MD at [facility] CATH LAB    Loop insertion Left 11/8/2017    [facility] at [facility] CATH LAB    loop  recorder  11/2017    Right heart cath Right 1/26/2018    [facility]    ROTATOR CUFF REPAIR      CHARISSA During Cath Case N/A 1/26/2018    Performed by [facility] C[facility] LAB    TOTAL KNEE ARTHROPLASTY      bilaterally     Social History     Socioeconomic History    Marital status:      Spouse name: Not on file    Number of children: Not on file    Years of education: Not on file    Highest education level: Not on file   Social Needs    Financial resource strain: Not on file    Food insecurity - worry: Not on file    Food insecurity - inability: Not on file    Transportation needs - medical: Not on file    Transportation needs - non-medical: Not on file   Occupational History    Not on file   Tobacco Use    Smoking status: Never Smoker    Smokeless tobacco: Never Used   Substance and Sexual Activity    Alcohol use: Yes     Comment: socially on rare occasions    Drug use: No    Sexual activity: Not Currently     Partners: Male   Other Topics Concern    Not on file   Social History Narrative    Not on file     Family History   Problem Relation Age of Onset    Diabetes Mother     Cancer Mother     Stroke Mother     Heart disease Father     Cancer Sister     Stroke Brother        Review of patient's allergies indicates:   Allergen Reactions    Baclofen Hives    Pamelor [nortriptyline] Palpitations       Current Outpatient Medications   Medication Sig    albuterol (PROVENTIL/VENTOLIN HFA) 90 mcg/actuation inhaler Inhale 2 puffs into the lungs every 6 (six) hours as needed. Rescue    alendronate (FOSAMAX) 35 MG tablet Take 1 tablet (35 mg total) by mouth every 7 days.    amLODIPine (NORVASC) 5 MG tablet Take 5 mg by mouth once daily.    atorvastatin (LIPITOR) 20 MG tablet Take 1 tablet by mouth once daily.    calcium carbonate-vit D3-min 600 mg calcium- 400 unit Tab Take 1 tablet by mouth 2 (two) times daily.    clopidogrel (PLAVIX)  "75 mg tablet Take 75 mg by mouth once daily.    furosemide (LASIX) 20 MG tablet Take 1 tablet (20 mg total) by mouth once daily.    gabapentin (NEURONTIN) 300 MG capsule Take 1 capsule (300 mg total) by mouth 3 (three) times daily.    lidocaine-prilocaine (EMLA) cream Apply topically 3 (three) times daily as needed.    metoprolol succinate (TOPROL-XL) 25 MG 24 hr tablet Take 25 mg by mouth once daily.    montelukast (SINGULAIR) 10 mg tablet TAKE 1 TABLET ONCE DAILY IN THE EVENING    potassium chloride (KLOR-CON) 8 MEQ TbSR Take 1 tablet (8 mEq total) by mouth once daily.    primidone (MYSOLINE) 50 MG Tab Take 50 mg by mouth every evening.    promethazine (PHENERGAN) 25 MG tablet Take 1 tablet (25 mg total) by mouth every 6 (six) hours as needed for Nausea.    rivaroxaban (XARELTO) 20 mg Tab Take 20 mg by mouth once daily.    sertraline (ZOLOFT) 100 MG tablet TAKE 1 TABLET DAILY    traMADol (ULTRAM) 50 mg tablet Take 1 tablet (50 mg total) by mouth daily as needed for Pain.    traZODone (DESYREL) 100 MG tablet TAKE 1 TABLET DAILY AT BEDTIME    methocarbamol (ROBAXIN) 750 MG Tab      No current facility-administered medications for this visit.        REVIEW OF SYSTEMS:    GENERAL:  No weight loss or fevers.  HEENT:  Negative for frequent or significant headaches.  NECK:  + neck pain  RESPIRATORY:  Negative for wheezing or shortness of breath.  CARDIOVASCULAR:  Negative for chest pain or palpitations.  GI:  Negative for abdominal discomfort, blood in stools or black stools or change in bowel habits.  MUSCULOSKELETAL:  See HPI.  SKIN:  Negative for lesions, rash, and itching.  PSYCH:  + sleep disturbance + hx of PTSD and anxiety  HEMATOLOGY/LYMPHOLOGY:    Takes Xarelto and Plavix.  NEURO:   No history of seizures or tremors. Hx of TIA  All other reviewed and negative other than HPI.    OBJECTIVE:    /71   Pulse 60   Ht 5' 11" (1.803 m)   Wt 117.2 kg (258 lb 6.1 oz)   BMI 36.04 kg/m²     PHYSICAL " EXAMINATION:    General appearance: Well appearing, in no acute distress, alert and oriented x3. Obese.  Psych:  Mood and affect appropriate.  Skin: Skin color, texture, turgor normal, no rashes or lesions, in both upper and lower body.  Head/face:  Normocephalic, atraumatic.   Cor: RRR  Pulm: Breathing unlabored.  GI:  Soft and non-tender.  Back: Straight leg raising is negative to radicular pain. Tenderness to palpation over the lumbar paraspinous muscles. + pain with back extension. Decreased range of motion on extension/flexion.  Extremities: Peripheral joint ROM is full and pain free without obvious instability or laxity in all four extremities. No deformities, edema, or skin discoloration.  Musculoskeletal: Bilateral upper and lower extremity strength is normal and symmetric.  No atrophy or tone abnormalities are noted.  Neuro: No loss of sensation is noted.  Gait: Antalgic, uses cane.    ASSESSMENT: 69 y/o female with chronic axial low back pain, consistent with facet arthropathy.     1. Lumbar spondylosis    2. DDD (degenerative disc disease), lumbar    3. Chronic pain syndrome    4. Obesity with body mass index of 30.0-39.9        PLAN:      - I have stressed the importance of physical activity and a home exercise plan to help with pain and improve health.  - Continue physical therapy for low back.   - Schedule for Left and Right L4/5 and L5/S1 medial branch RFA.  - RTC after procedure.    The above plan and management options were discussed at length with patient. Patient is in agreement with the above and verbalized understanding. It will be communicated with the referring physician via electronic record, fax, or mail.    Rony Riddle III  02/28/2019

## 2019-03-22 ENCOUNTER — HOSPITAL ENCOUNTER (OUTPATIENT)
Facility: HOSPITAL | Age: 69
Discharge: HOME | End: 2019-03-22
Attending: ANESTHESIOLOGY | Admitting: ANESTHESIOLOGY
Payer: MEDICARE

## 2019-03-22 VITALS
SYSTOLIC BLOOD PRESSURE: 159 MMHG | DIASTOLIC BLOOD PRESSURE: 74 MMHG | WEIGHT: 260 LBS | HEART RATE: 64 BPM | BODY MASS INDEX: 36.4 KG/M2 | TEMPERATURE: 98 F | OXYGEN SATURATION: 97 % | HEIGHT: 71 IN | RESPIRATION RATE: 18 BRPM

## 2019-03-22 DIAGNOSIS — M47.816 FACET ARTHRITIS OF LUMBAR REGION: ICD-10-CM

## 2019-03-22 DIAGNOSIS — M47.816 LUMBAR SPONDYLOSIS: Primary | ICD-10-CM

## 2019-03-22 PROCEDURE — 64635 DESTROY LUMB/SAC FACET JNT: CPT | Mod: LT | Performed by: ANESTHESIOLOGY

## 2019-03-22 PROCEDURE — 64635 DESTROY LUMB/SAC FACET JNT: CPT | Mod: LT,,, | Performed by: ANESTHESIOLOGY

## 2019-03-22 PROCEDURE — 27201423 OPTIME MED/SURG SUP & DEVICES STERILE SUPPLY: Performed by: ANESTHESIOLOGY

## 2019-03-22 PROCEDURE — 99152 MOD SED SAME PHYS/QHP 5/>YRS: CPT | Performed by: ANESTHESIOLOGY

## 2019-03-22 PROCEDURE — 99152 PR MOD CONSCIOUS SEDATION, SAME PHYS, 5+ YRS, FIRST 15 MIN: ICD-10-PCS | Mod: ,,, | Performed by: ANESTHESIOLOGY

## 2019-03-22 PROCEDURE — 64636 DESTROY L/S FACET JNT ADDL: CPT | Mod: LT | Performed by: ANESTHESIOLOGY

## 2019-03-22 PROCEDURE — 64636 DESTROY L/S FACET JNT ADDL: CPT | Mod: LT,,, | Performed by: ANESTHESIOLOGY

## 2019-03-22 PROCEDURE — 64635 PR DESTROY LUMB/SAC FACET JNT: ICD-10-PCS | Mod: LT,,, | Performed by: ANESTHESIOLOGY

## 2019-03-22 PROCEDURE — 25000003 PHARM REV CODE 250: Performed by: ANESTHESIOLOGY

## 2019-03-22 PROCEDURE — 63600175 PHARM REV CODE 636 W HCPCS: Performed by: ANESTHESIOLOGY

## 2019-03-22 PROCEDURE — 64636 PR DESTROY L/S FACET JNT ADDL: ICD-10-PCS | Mod: LT,,, | Performed by: ANESTHESIOLOGY

## 2019-03-22 PROCEDURE — 99152 MOD SED SAME PHYS/QHP 5/>YRS: CPT | Mod: ,,, | Performed by: ANESTHESIOLOGY

## 2019-03-22 PROCEDURE — 99153 MOD SED SAME PHYS/QHP EA: CPT | Performed by: ANESTHESIOLOGY

## 2019-03-22 RX ORDER — METHYLPREDNISOLONE ACETATE 40 MG/ML
INJECTION, SUSPENSION INTRA-ARTICULAR; INTRALESIONAL; INTRAMUSCULAR; SOFT TISSUE
Status: DISCONTINUED | OUTPATIENT
Start: 2019-03-22 | End: 2019-03-22 | Stop reason: HOSPADM

## 2019-03-22 RX ORDER — MIDAZOLAM HYDROCHLORIDE 1 MG/ML
INJECTION, SOLUTION INTRAMUSCULAR; INTRAVENOUS
Status: DISCONTINUED | OUTPATIENT
Start: 2019-03-22 | End: 2019-03-22 | Stop reason: HOSPADM

## 2019-03-22 RX ORDER — LIDOCAINE HYDROCHLORIDE 10 MG/ML
INJECTION INFILTRATION; PERINEURAL
Status: DISCONTINUED | OUTPATIENT
Start: 2019-03-22 | End: 2019-03-22 | Stop reason: HOSPADM

## 2019-03-22 RX ORDER — FENTANYL CITRATE 50 UG/ML
INJECTION, SOLUTION INTRAMUSCULAR; INTRAVENOUS
Status: DISCONTINUED | OUTPATIENT
Start: 2019-03-22 | End: 2019-03-22 | Stop reason: HOSPADM

## 2019-03-22 RX ORDER — SODIUM CHLORIDE 9 MG/ML
500 INJECTION, SOLUTION INTRAVENOUS CONTINUOUS
Status: DISCONTINUED | OUTPATIENT
Start: 2019-03-22 | End: 2019-06-18

## 2019-03-22 RX ORDER — LIDOCAINE HYDROCHLORIDE 20 MG/ML
INJECTION, SOLUTION INFILTRATION; PERINEURAL
Status: DISCONTINUED | OUTPATIENT
Start: 2019-03-22 | End: 2019-03-22 | Stop reason: HOSPADM

## 2019-03-22 RX ORDER — BUPIVACAINE HYDROCHLORIDE 2.5 MG/ML
INJECTION, SOLUTION EPIDURAL; INFILTRATION; INTRACAUDAL
Status: DISCONTINUED | OUTPATIENT
Start: 2019-03-22 | End: 2019-03-22 | Stop reason: HOSPADM

## 2019-03-22 NOTE — NURSING
IV d/c'd with cath tip intact.  Pt and pt family verbalized understanding of dc instructions.  Verbalized understanding of d/c instructions.

## 2019-03-22 NOTE — PLAN OF CARE
Problem: Adult Inpatient Plan of Care  Goal: Plan of Care Review  Outcome: Ongoing (interventions implemented as appropriate)  Pt transferred from procedure via stretcher.  Vss. Post op orders and assessment initiated.  3 bandaids to r lower back remain nancy.  0 bleed, 0 hematoma.  Pt aao, tolerating po.   Pt in no acute distress and verbalizes no complaints.  Family called to bs.  Will continue to monitor.  Call bell within reach

## 2019-03-22 NOTE — OP NOTE
Patient Name: Sveta العراقي  MRN: [id number]    INFORMED CONSENT: The procedure, risks, benefits and options were discussed with patient. There are no contraindications to the procedure. The patient expressed understanding and agreed to proceed. The personnel performing the procedure was discussed. I verify that I personally obtained Sveta's consent prior to the start of the procedure and the signed consent can be found on the patient's chart.    PROCEDURE: LEFT L4/5 AND L5/S1 FACET MEDIAL BRANCH NERVE RADIOFREQUENCY NEUROTOMY     Procedure Date: 3/22/2019     Anesthesia:  systemic    Pre Procedure diagnosis:     1. Lumbar Spondylosis    Post-Procedure diagnosis:   Same    Sedation: Yes - Fentanyl 100 mcg and Midazolam 2 mg    DESCRIPTION OF PROCEDURE: The patient was brought to the procedure room.  IV access was obtained prior to the procedure. The patient was positioned prone on the fluoroscopy table. Continuous hemodynamic monitoring was initiated including blood pressure and pulse oximetry. IV sedation was administered incrementally to allow the patient to remain comfortable and conversant throughout the procedure. The area of the lumbar spine was prepped chlorhexidine three times and draped into a sterile field.  Fluoroscopy was used to identify the location of the LEFT side L3, L4 medial branch nerves and L5 dorsal ramus at the junctions of the superior articular process and the transverse processes of L4, L5, and the sacral ala respectively.  Skin anesthesia was achieved using Lidocaine 1% over the injection sites. An 18 gauge, 100mm (10mm active tip) curved RF needle was slowly inserted at each level using AP, lateral and oblique fluoroscopic imaging. Negative aspiration for blood or CSF was confirmed.  Motor stimulation at 2Hz up to 1.5V did not cause any radicular symptoms at any level. Each level was anesthetized with 1 cc of lidocaine 2%. Radiofrequency lesioning was performed for 120 seconds at 85  degrees Celsius in two different positions at each level.  At each level, 8 mg of Depo-Medrol and 1 cc of Bupivacaine 0.25% was injected. The needles were removed and bleeding was nil.  A sterile dressing was applied. Sveta was taken back to the recovery room for further observation.     Blood Loss: Nil  Specimen: None    Discharge Diagnosis: Same as Pre and Post Procedure  Condition on Discharge: Stable.  Diet on Discharge: Same as before.  Activity: as per instruction sheet.  Discharge to: Home with a responsible adult.  Follow up: as per Discharge instructions

## 2019-03-22 NOTE — DISCHARGE SUMMARY
Discharge Note  Short Stay      SUMMARY     Admit Date: 3/22/2019    Attending Physician: Rony Riddle III      Discharge Physician: Rony Riddle III      Discharge Date: 3/22/2019 1:53 PM    Final Diagnosis:   1. Lumbar spondylosis    2. Facet arthritis of lumbar region        Disposition: Home or self care    Patient Instructions:   Discharge Medication List as of 3/22/2019 12:47 PM      CONTINUE these medications which have NOT CHANGED    Details   albuterol (PROVENTIL/VENTOLIN HFA) 90 mcg/actuation inhaler Inhale 2 puffs into the lungs every 6 (six) hours as needed. Rescue, Historical Med      alendronate (FOSAMAX) 35 MG tablet Take 1 tablet (35 mg total) by mouth every 7 days., Starting Tue 8/21/2018, Normal      amLODIPine (NORVASC) 5 MG tablet Take 5 mg by mouth once daily., Historical Med      atorvastatin (LIPITOR) 20 MG tablet Take 1 tablet by mouth once daily., Starting Mon 8/14/2017, Historical Med      calcium carbonate-vit D3-min 600 mg calcium- 400 unit Tab Take 1 tablet by mouth 2 (two) times daily., Starting Tue 8/21/2018, Normal      furosemide (LASIX) 20 MG tablet Take 1 tablet (20 mg total) by mouth once daily., Starting Thu 2/7/2019, Until Fri 2/7/2020, Normal      gabapentin (NEURONTIN) 300 MG capsule Take 1 capsule (300 mg total) by mouth 3 (three) times daily., Starting Wed 1/2/2019, Normal      methocarbamol (ROBAXIN) 750 MG Tab Starting Mon 2/25/2019, Historical Med      metoprolol succinate (TOPROL-XL) 25 MG 24 hr tablet Take 25 mg by mouth once daily., Historical Med      montelukast (SINGULAIR) 10 mg tablet TAKE 1 TABLET ONCE DAILY IN THE EVENING, Normal      potassium chloride (KLOR-CON) 8 MEQ TbSR Take 1 tablet (8 mEq total) by mouth once daily., Starting Tue 1/15/2019, Normal      primidone (MYSOLINE) 50 MG Tab Take 50 mg by mouth every evening., Historical Med      sertraline (ZOLOFT) 100 MG tablet TAKE 1 TABLET DAILY, Normal      traMADol (ULTRAM) 50 mg tablet Take  1 tablet (50 mg total) by mouth daily as needed for Pain., Starting Fri 2/22/2019, Normal      traZODone (DESYREL) 100 MG tablet TAKE 1 TABLET DAILY AT BEDTIME, Normal      clopidogrel (PLAVIX) 75 mg tablet Take 75 mg by mouth once daily., Historical Med      lidocaine-prilocaine (EMLA) cream Apply topically 3 (three) times daily as needed., Historical Med      promethazine (PHENERGAN) 25 MG tablet Take 1 tablet (25 mg total) by mouth every 6 (six) hours as needed for Nausea., Starting Mon 1/7/2019, Normal      rivaroxaban (XARELTO) 20 mg Tab Take 20 mg by mouth once daily., Historical Med                 Discharge Diagnosis: Same as Pre and Post Procedure  Condition on Discharge: Stable.  Diet on Discharge: Same as before.  Activity: as per instruction sheet.  Discharge to: Home with a responsible adult.  Follow up: 2 weeks

## 2019-03-22 NOTE — DISCHARGE INSTRUCTIONS

## 2019-04-08 ENCOUNTER — TELEPHONE (OUTPATIENT)
Dept: PAIN MEDICINE | Facility: CLINIC | Age: 69
End: 2019-04-08

## 2019-04-08 NOTE — TELEPHONE ENCOUNTER
----- Message from Esther Whelan LPN sent at 3/28/2019  8:51 AM CDT -----  Gm,  This patient will be having her right side RFA L4/5,L5/S1 done at [facility] once you receive the machine per

## 2019-04-09 ENCOUNTER — TELEPHONE (OUTPATIENT)
Dept: PRIMARY CARE CLINIC | Facility: CLINIC | Age: 69
End: 2019-04-09

## 2019-04-09 NOTE — TELEPHONE ENCOUNTER
----- Message from Linda Landers sent at 4/9/2019 11:32 AM CDT -----  Contact: Patient  Type:  Patient Returning Call    Who Called:  Patient  Who Left Message for Patient: Barney   Does the patient know what this is regarding?:  no  Best Call Back Number:  [phone]

## 2019-04-09 NOTE — TELEPHONE ENCOUNTER
Spoke with patient about having her RFA done on 4/18/19. Patient stated that she was having another procedure done 4/15/19 and would not be able to have it done at that time. Patient requested call back at end of month to revisit scheduling RFA. No further issues discussed.

## 2019-04-23 ENCOUNTER — OFFICE VISIT (OUTPATIENT)
Dept: PAIN MEDICINE | Facility: CLINIC | Age: 69
End: 2019-04-23
Attending: ANESTHESIOLOGY
Payer: MEDICARE

## 2019-04-23 VITALS
SYSTOLIC BLOOD PRESSURE: 141 MMHG | HEIGHT: 71 IN | HEART RATE: 52 BPM | DIASTOLIC BLOOD PRESSURE: 68 MMHG | WEIGHT: 254.75 LBS | BODY MASS INDEX: 35.66 KG/M2 | OXYGEN SATURATION: 98 %

## 2019-04-23 DIAGNOSIS — M51.36 DDD (DEGENERATIVE DISC DISEASE), LUMBAR: ICD-10-CM

## 2019-04-23 DIAGNOSIS — E66.9 OBESITY WITH BODY MASS INDEX OF 30.0-39.9: ICD-10-CM

## 2019-04-23 DIAGNOSIS — G89.4 CHRONIC PAIN SYNDROME: ICD-10-CM

## 2019-04-23 DIAGNOSIS — M47.816 LUMBAR SPONDYLOSIS: Primary | ICD-10-CM

## 2019-04-23 PROCEDURE — 99213 OFFICE O/P EST LOW 20 MIN: CPT | Mod: S$PBB,,, | Performed by: ANESTHESIOLOGY

## 2019-04-23 PROCEDURE — 99999 PR PBB SHADOW E&M-EST. PATIENT-LVL III: CPT | Mod: PBBFAC,,, | Performed by: ANESTHESIOLOGY

## 2019-04-23 PROCEDURE — 99213 PR OFFICE/OUTPT VISIT, EST, LEVL III, 20-29 MIN: ICD-10-PCS | Mod: S$PBB,,, | Performed by: ANESTHESIOLOGY

## 2019-04-23 PROCEDURE — 99213 OFFICE O/P EST LOW 20 MIN: CPT | Mod: PBBFAC,PN | Performed by: ANESTHESIOLOGY

## 2019-04-23 PROCEDURE — 99999 PR PBB SHADOW E&M-EST. PATIENT-LVL III: ICD-10-PCS | Mod: PBBFAC,,, | Performed by: ANESTHESIOLOGY

## 2019-04-23 NOTE — PROGRESS NOTES
Chronic Pain - Established    INTERVAL HISTORY (04/23/2019):    Sveta العراقي presents to the clinic today for a follow-up appointment for low back pain. Since the last visit, the back pain has been improving. The patient reports 75% pain relief after Left L4/5 and L5/S1 medial branch RFA which continues. It is unclear why she was not scheduled for the right side. Current pain intensity is 4/10. Overall, she notes significant improvement of her low back pain with PT and the RFA. Her chief complaint today is right jaw pain. She recently had right TMJ arthroscopy on 4/15. She is scheduled to follow up this Friday with [facility] to discuss further options. She is interested in scheduling the RFA for the right side but prefers to wait until after her jaw pain resolves.      INTERVAL HISTORY (02/28/2019):    Sveta العراقي presents to the clinic today for a follow-up appointment for low back pain. Since the last visit, the pain has been persistent. The patient reports 95% pain relief after Bilateral L4/5 and L5/S1 medial branch blocks which lasted 72 hours. Current pain intensity is 8/10. The pain is located in the bilateral lumbar paraspinal area. The pain is made worse with standing, activity, and bending.      INTERVAL HISTORY (02/07/2019):    Sveta العراقي presents to the clinic today for a follow-up appointment for low back and right leg pain. Since the last visit, the right leg pain has been improving. The patient reports 100% improvement of her right leg pain after Repeat Right L4/5 and L5/S1 Transforaminal epidural steroid injection which continues. However, she continues to experience a significant amount of pain in her low back. The back pain is located in the bilateral lumbar paraspinal area. There is no radiation of the pain. Current pain intensity is 9/10. She describes the back pain as constant and sharp. The pain is made worse with prolonged standing and bending. She also complains of a constant pain in the  posterior cervical spine which is made worse with activity. The back pain is more significant than the neck pain. At the last visit a referral was placed for physical therapy.  The patient reports she was never contacted to be set up for evaluation.       INTERVAL HISTORY (10/18/2018):    Sveta العراقي presents to the clinic today for a follow-up appointment for low back pain. The patient reports 80% pain relief after Right Transforaminal epidural steroid injection at L4/5 and L5/S1 which lasted 11 days. She reports the pain in her low back and right leg returned after changing the sheets in her bed on October 8th. Current pain intensity is 9/10. The pain is located in the right lumbar paraspinal area and radiates down the lateral aspect of the leg into the foot. She has not started physical therapy. She is concerned about transportation to .    Patient denies bowel/bladder incontinence, significant motor weakness and loss of sensations.     SUBJECTIVE:    Sveta العراقي is a 67 y/o female with hx of a-fib and TIA on Xarelto and Plavix who presents to the clinic for the evaluation of lower back pain. The pain started in July 2018 and symptoms have been persistent. No recent trauma or inciting event. She reports a history of chronic low back problems that have been on and off for years. The pain is located in the bilateral lower back area and radiates down the lateral aspect of the right leg stopping in the calf.  The pain is described as aching, burning, sharp and stabbing and is rated as 9/10. The pain is rated with a score of  7/10 on the BEST day and a score of 10/10 on the WORST day.  Symptoms interfere with daily activity and sleeping. The pain is exacerbated by Standing, Walking and Getting out of bed/chair.  The pain is mitigated by heat and rest.  The patient reports 3 hours of uninterrupted sleep per night.    Patient denies bowel/bladder incontinence, significant motor weakness and loss of  sensations.    Physical Therapy/Home Exercise: yes      Pain Disability Index Review:  Last 3 PDI Scores 4/23/2019 2/28/2019 2/7/2019   Pain Disability Index (PDI) 19 30 43       Pain Medications:    - Opioids: Ultram (Tramadol HCL) 50 mg daily as needed  - Gabapentin 300 mg TID  - Flexeril 10 mg q8 hours as needed    Anti-coagulants:    - Xarelto 20 mg daily  - Plavix 75 mg daily     report: Reviewed    Pain Procedures:   Bilateral L4/5 and L5/S1 medial branch blocks (2/14/2019)  Right Transforaminal epidural steroid injection at L4/5 and L5/S1 (1/03/2019)  Right Transforaminal epidural steroid injection at L4/5 and L5/S1 (9/27/2018)  Epidural steroid injections around 2011 in [place]    Imaging:     Cervical MRI (10/29/2018):    FINDINGS:  Alignment: Normal.    Vertebrae: Normal marrow signal. No fracture.    Skull base and craniocervical junction: Normal.    C2-C3: No abnormality.    C3-C4: No abnormality.    C4-C5: Slight disc bulge, anterior cord contact slight deformity no change in signal.  AP central canal diameter 11 mm.  Normal foramina.  Mild facet degeneration bilaterally.    C5-C6: Mild disc space narrowing and desiccation.  Broad-based irregular disc bulge, a contain protrusion maximum 2.3 mm thickness, slight cord contact on the right and displacement no change in signal.  AP central canal diameter redo at 9 mm at the level of the maximum protrusion.  Normal foramina.  Mild facet degeneration.    C6-C7: Mild loss of disc height with disc desiccation.  Normal central canal foramina.  Mild facet degeneration.    C7-T1: No abnormality.    Lumbar MRI (8/16/2018):    FINDINGS:  No destructive bone lesions are identified.  The cord ends at L1-2 interspace.  Small focal areas of increased T2 signal in the kidneys are less numerous at least 1 is seen in the left kidney upper pole.  Consistent with cyst.    At T12-L1, L1-2, L2-3 normal disc, central canal, foramina and facets.    At L3-4 normal disc height  with disc desiccation.  Redemonstration of broad-based disc herniation, a contained protrusion 3.4 mm thick, and posterior ligamentous hypertrophy.  AP central canal diameter 10 mm.  There is contact with the transiting L4 nerve roots bilaterally without definite displacement.  Normal foramina and facets.    At L4-5 normal disc height with disc desiccation.  Broad-based herniation, a contained protrusion 4.3 mm thickness, minimal ligament hypertrophy.  AP central canal diameter 11 mm.  Normal foramina.  Facet hypertrophy and degeneration bilaterally.    At L5-S1 normal disc height with disc desiccation.  Broad-based herniation a contain protrusion that is mildly asymmetrical across the central canal, maximum thickness to the right of the canal 5 mm, with displacement and mild compression of the transiting right S1 nerve root.  AP central canal diameter 11 mm.  The left foramen shows contact of the herniated disc with the exiting L5 nerve root displacing it cephalad, loss of perineural fat approximately 50%.  The right foramen appears to have adequate perineural fat.  Facet degeneration and hypertrophy bilaterally.    Normal sacroiliac joints.    Past Medical History:   Diagnosis Date    A-fib 2017    Abnormal stress test 2017    Anticoagulant long-term use     Asthma     Chest pain 2018    Cholelithiasis     Chronic back pain     Chronic neck pain     Depression     Encounter for blood transfusion     GERD (gastroesophageal reflux disease)     Hernia     Hyperlipidemia 2017    Hypertension     Osteoarthritis     knee and back pain    Osteoporosis     Pneumonia     history    PTSD (post-traumatic stress disorder)     Pulmonary embolism 1999    post fall    Sleep apnea     could not tolerate c pap    Stroke     TIA (transient ischemic attack) 2017    no residual     Past Surgical History:   Procedure Laterality Date    ABDOMINAL SURGERY      bile duct repair x2    APPENDECTOMY      bile duct  surgery      BLOCK, NERVE, FACET JOINT, LUMBAR, MEDIAL BRANCH-kash MBB L4/5,L5/S1 Bilateral 2/14/2019    Performed by [facility]ony Riddle III, [facility] at [facility] OR    CARDIAC CATHETERIZATION  01/26/2018    CHOLECYSTECTOMY      EYE SURGERY      cataracts    HERNIA REPAIR      HIP ARTHROSCOPY W/ LABRAL REPAIR      right    HYSTERECTOMY      Injection,steroid,epidural,transforaminal approach RIGHT L4/5 AND L5/S1 LUMBAR Right 9/27/2018    Performed by Rony Riddle III, MD at [facility] OR    Injection,steroid,epidural,transforaminal approach---right L4/5, L5/S1 Right 1/3/2019    Performed by Rony Riddle III, MD at [facility] OR    Left heart cath Left 1/26/2018    Performed by Nadeem Massey MD at [facility] CATH LAB    Loop insertion Left 11/8/2017    Performed by Nadeem Massey MD at [facility] CATH LAB    loop recorder  11/2017    RADIOFREQUENCY ABLATION, NERVE, SPINAL, LUMBAR, MEDIAL BRANCH, 1 LEVEL-left RFA L4/5,L5/S1. Left 3/22/2019    Performed by [facility] at [facility] CATH LAB    Right heart cath Right 1/26/2018    Performed by Nadeem Massey MD at [facility] CATH LAB    ROTATOR CUFF REPAIR      CHARISSA During Cath Case N/A 1/26/2018    Performed by Nadeem Massey MD at [facility] CATH LAB    TOTAL KNEE ARTHROPLASTY      bilaterally     Social History     Socioeconomic History    Marital status:      Spouse name: Not on file    Number of children: Not on file    Years of education: Not on file    Highest education level: Not on file   Occupational History    Not on file   Social Needs    Financial resource strain: Not on file    Food insecurity:     Worry: Not on file     Inability: Not on file    Transportation needs:     Medical: Not on file     Non-medical: Not on file   Tobacco Use    Smoking status: Never Smoker    Smokeless tobacco: Never Used   Substance and Sexual Activity    Alcohol use: Yes     Comment: socially on rare occasions    Drug use: No    Sexual activity:  Not Currently     Partners: Male   Lifestyle    Physical activity:     Days per week: Not on file     Minutes per session: Not on file    Stress: Not on file   Relationships    Social connections:     Talks on phone: Not on file     Gets together: Not on file     Attends Faith service: Not on file     Active member of club or organization: Not on file     Attends meetings of clubs or organizations: Not on file     Relationship status: Not on file   Other Topics Concern    Not on file   Social History Narrative    Not on file     Family History   Problem Relation Age of Onset    Diabetes Mother     Cancer Mother     Stroke Mother     Heart disease Father     Cancer Sister     Stroke Brother        Review of patient's allergies indicates:   Allergen Reactions    Baclofen Hives    Pamelor [nortriptyline] Palpitations       Current Outpatient Medications   Medication Sig    albuterol (PROVENTIL/VENTOLIN HFA) 90 mcg/actuation inhaler Inhale 2 puffs into the lungs every 6 (six) hours as needed. Rescue    alendronate (FOSAMAX) 35 MG tablet Take 1 tablet (35 mg total) by mouth every 7 days.    atorvastatin (LIPITOR) 20 MG tablet Take 1 tablet by mouth once daily.    calcium carbonate-vit D3-min 600 mg calcium- 400 unit Tab Take 1 tablet by mouth 2 (two) times daily.    clopidogrel (PLAVIX) 75 mg tablet Take 75 mg by mouth once daily.    furosemide (LASIX) 20 MG tablet Take 1 tablet (20 mg total) by mouth once daily.    gabapentin (NEURONTIN) 300 MG capsule Take 1 capsule (300 mg total) by mouth 3 (three) times daily.    HYDROcodone-acetaminophen (NORCO) 5-325 mg per tablet Take 1 tablet by mouth every 6 (six) hours as needed for Pain.    lidocaine-prilocaine (EMLA) cream Apply topically 3 (three) times daily as needed.    methocarbamol (ROBAXIN) 750 MG Tab     metoprolol succinate (TOPROL-XL) 25 MG 24 hr tablet Take 25 mg by mouth once daily.    montelukast (SINGULAIR) 10 mg tablet TAKE 1 TABLET  "ONCE DAILY IN THE EVENING    potassium chloride (KLOR-CON) 8 MEQ TbSR Take 1 tablet (8 mEq total) by mouth once daily.    primidone (MYSOLINE) 50 MG Tab Take 50 mg by mouth every evening.    promethazine (PHENERGAN) 25 MG tablet Take 1 tablet (25 mg total) by mouth every 6 (six) hours as needed for Nausea.    rivaroxaban (XARELTO) 20 mg Tab Take 20 mg by mouth once daily.    sertraline (ZOLOFT) 100 MG tablet TAKE 1 TABLET DAILY    traZODone (DESYREL) 100 MG tablet TAKE 1 TABLET DAILY AT BEDTIME    amLODIPine (NORVASC) 5 MG tablet Take 5 mg by mouth once daily.     No current facility-administered medications for this visit.      Facility-Administered Medications Ordered in Other Visits   Medication    0.9%  NaCl infusion       REVIEW OF SYSTEMS:    GENERAL:  No weight loss or fevers.  HEENT:  Negative for frequent or significant headaches. + jaw pain  NECK:  + neck pain  RESPIRATORY:  Negative for wheezing or shortness of breath.  CARDIOVASCULAR:  Negative for chest pain or palpitations.  GI:  Negative for abdominal discomfort, blood in stools or black stools or change in bowel habits.  MUSCULOSKELETAL:  See HPI.  SKIN:  Negative for lesions, rash, and itching.  PSYCH:  + sleep disturbance + hx of PTSD and anxiety  HEMATOLOGY/LYMPHOLOGY:    Takes Xarelto and Plavix.  NEURO:   No history of seizures or tremors. Hx of TIA  All other reviewed and negative other than HPI.    OBJECTIVE:    BP (!) 141/68   Pulse (!) 52   Ht 5' 11" (1.803 m)   Wt 115.5 kg (254 lb 11.9 oz)   SpO2 98%   BMI 35.53 kg/m²     PHYSICAL EXAMINATION:    General appearance: Well appearing, in no acute distress, alert and oriented x3. Obese.  Psych:  Mood and affect appropriate.  Skin: Skin color, texture, turgor normal, no rashes or lesions, in both upper and lower body.  Head/face:  Normocephalic, atraumatic.   Cor: RRR  Pulm: Breathing unlabored.  GI:  Soft and non-tender.  Back: Straight leg raising is negative to radicular pain. No " tenderness to palpation over the lumbar spine or paraspinous muscles. + pain with back extension.   Extremities: Peripheral joint ROM is full and pain free without obvious instability or laxity in all four extremities. No deformities, edema, or skin discoloration.  Musculoskeletal: Bilateral upper and lower extremity strength is normal and symmetric.  No atrophy or tone abnormalities are noted.  Neuro: No loss of sensation is noted.  Gait: Antalgic, uses cane.    ASSESSMENT: 67 y/o female with chronic axial low back pain, consistent with facet arthropathy.    1. Lumbar spondylosis    2. DDD (degenerative disc disease), lumbar    3. Chronic pain syndrome    4. Obesity with body mass index of 30.0-39.9        PLAN:      - I have stressed the importance of physical activity and a home exercise plan to help with pain and improve health.  - Continue home exercise plan from PT for low back pain.   - Will plan for Right L4/5 and L5/S1 medial branch RFA. Sveta will call when ready to schedule.    The above plan and management options were discussed at length with patient. Patient is in agreement with the above and verbalized understanding. It will be communicated with the referring physician via electronic record, fax, or mail.    Rony Riddle III  04/23/2019

## 2019-05-08 RX ORDER — SERTRALINE HYDROCHLORIDE 100 MG/1
TABLET, FILM COATED ORAL
Qty: 90 TABLET | Refills: 3 | Status: SHIPPED | OUTPATIENT
Start: 2019-05-08 | End: 2020-05-12

## 2019-06-18 ENCOUNTER — TELEPHONE (OUTPATIENT)
Dept: PRIMARY CARE CLINIC | Facility: CLINIC | Age: 69
End: 2019-06-18

## 2019-06-18 NOTE — TELEPHONE ENCOUNTER
----- Message from Keith No sent at 6/18/2019 11:58 AM CDT -----  Contact: self   patient want to know if you can fit her in today for stomach pain please call back at [phone]

## 2019-06-30 ENCOUNTER — EXTERNAL CHRONIC CARE MANAGEMENT (OUTPATIENT)
Dept: PRIMARY CARE CLINIC | Facility: CLINIC | Age: 69
End: 2019-06-30
Payer: MEDICARE

## 2019-06-30 PROCEDURE — 99490 PR CHRONIC CARE MGMT, 1ST 20 MIN: ICD-10-PCS | Mod: S$PBB,,, | Performed by: FAMILY MEDICINE

## 2019-06-30 PROCEDURE — 99490 CHRNC CARE MGMT STAFF 1ST 20: CPT | Mod: PBBFAC,PN | Performed by: FAMILY MEDICINE

## 2019-06-30 PROCEDURE — 99490 CHRNC CARE MGMT STAFF 1ST 20: CPT | Mod: S$PBB,,, | Performed by: FAMILY MEDICINE

## 2019-07-19 RX ORDER — MONTELUKAST SODIUM 10 MG/1
TABLET ORAL
Qty: 90 TABLET | Refills: 1 | Status: SHIPPED | OUTPATIENT
Start: 2019-07-19 | End: 2020-01-15

## 2019-07-22 DIAGNOSIS — Z12.11 COLON CANCER SCREENING: ICD-10-CM

## 2019-08-01 DIAGNOSIS — F51.01 PRIMARY INSOMNIA: ICD-10-CM

## 2019-08-02 RX ORDER — GABAPENTIN 300 MG/1
CAPSULE ORAL
Qty: 270 CAPSULE | Refills: 3 | Status: SHIPPED | OUTPATIENT
Start: 2019-08-02 | End: 2020-09-04

## 2019-08-02 RX ORDER — TRAZODONE HYDROCHLORIDE 100 MG/1
TABLET ORAL
Qty: 90 TABLET | Refills: 3 | Status: SHIPPED | OUTPATIENT
Start: 2019-08-02 | End: 2020-08-10

## 2019-08-06 RX ORDER — FUROSEMIDE 20 MG/1
TABLET ORAL
Qty: 90 TABLET | Refills: 1 | Status: SHIPPED | OUTPATIENT
Start: 2019-08-06 | End: 2020-02-03

## 2019-08-12 ENCOUNTER — TELEPHONE (OUTPATIENT)
Dept: ORTHOPEDICS | Facility: CLINIC | Age: 69
End: 2019-08-12

## 2019-08-12 NOTE — TELEPHONE ENCOUNTER
Spoke with daughter in law, Katherine. She stated she was able to get a sooner appointment at [facility] for Sveta. I instructed her to call if there is anything else we can do for her. No other issues discussed.

## 2019-08-12 NOTE — TELEPHONE ENCOUNTER
----- Message from Alison Vyas sent at 8/12/2019  8:07 AM CDT -----  Contact: daughter in LawKatherine  Type:  Sooner Apoointment Request    Caller is requesting a sooner appointment.  Caller declined first available appointment listed below.  Caller will not accept being placed on the waitlist and is requesting a message be sent to doctor.    Name of Caller:  Daughter in Law, Katherine العراقي  When is the first available appointment?    Symptoms:  Fractured elbow discharged Friday 08/09/19 [facility] Call Back Number:  [phone]  Additional Information:  Patient only has a sling on the arm and is in a lot of pain. Please call daughter in law to schedule. Thanks!

## 2019-08-31 ENCOUNTER — EXTERNAL CHRONIC CARE MANAGEMENT (OUTPATIENT)
Dept: PRIMARY CARE CLINIC | Facility: CLINIC | Age: 69
End: 2019-08-31
Payer: MEDICARE

## 2019-08-31 PROCEDURE — 99490 CHRNC CARE MGMT STAFF 1ST 20: CPT | Mod: S$PBB,,, | Performed by: FAMILY MEDICINE

## 2019-08-31 PROCEDURE — 99490 PR CHRONIC CARE MGMT, 1ST 20 MIN: ICD-10-PCS | Mod: S$PBB,,, | Performed by: FAMILY MEDICINE

## 2019-08-31 PROCEDURE — 99490 CHRNC CARE MGMT STAFF 1ST 20: CPT | Mod: PBBFAC,PN | Performed by: FAMILY MEDICINE

## 2019-09-04 PROBLEM — R93.3 ABNORMAL FINDINGS ON RADIOLOGICAL EXAMINATION OF GASTROINTESTINAL TRACT: Status: ACTIVE | Noted: 2019-09-04

## 2019-09-30 ENCOUNTER — EXTERNAL CHRONIC CARE MANAGEMENT (OUTPATIENT)
Dept: PRIMARY CARE CLINIC | Facility: CLINIC | Age: 69
End: 2019-09-30
Payer: MEDICARE

## 2019-09-30 PROCEDURE — 99490 CHRNC CARE MGMT STAFF 1ST 20: CPT | Mod: S$PBB,,, | Performed by: FAMILY MEDICINE

## 2019-09-30 PROCEDURE — 99490 PR CHRONIC CARE MGMT, 1ST 20 MIN: ICD-10-PCS | Mod: S$PBB,,, | Performed by: FAMILY MEDICINE

## 2019-09-30 PROCEDURE — 99490 CHRNC CARE MGMT STAFF 1ST 20: CPT | Mod: PBBFAC,PN | Performed by: FAMILY MEDICINE

## 2019-10-02 PROBLEM — R10.9 ABDOMINAL PAIN: Status: ACTIVE | Noted: 2019-10-02

## 2019-10-05 DIAGNOSIS — M85.80 OSTEOPENIA, UNSPECIFIED LOCATION: ICD-10-CM

## 2019-10-07 RX ORDER — POTASSIUM CHLORIDE 600 MG/1
TABLET, FILM COATED, EXTENDED RELEASE ORAL
Qty: 90 TABLET | Refills: 3 | Status: SHIPPED | OUTPATIENT
Start: 2019-10-07 | End: 2020-09-08

## 2019-10-07 RX ORDER — ALENDRONATE SODIUM 35 MG/1
TABLET ORAL
Qty: 12 TABLET | Refills: 3 | Status: SHIPPED | OUTPATIENT
Start: 2019-10-07 | End: 2020-09-08

## 2019-10-31 ENCOUNTER — EXTERNAL CHRONIC CARE MANAGEMENT (OUTPATIENT)
Dept: PRIMARY CARE CLINIC | Facility: CLINIC | Age: 69
End: 2019-10-31
Payer: MEDICARE

## 2019-10-31 PROCEDURE — 99490 PR CHRONIC CARE MGMT, 1ST 20 MIN: ICD-10-PCS | Mod: S$PBB,,, | Performed by: FAMILY MEDICINE

## 2019-10-31 PROCEDURE — 99490 CHRNC CARE MGMT STAFF 1ST 20: CPT | Mod: PBBFAC,PN | Performed by: FAMILY MEDICINE

## 2019-10-31 PROCEDURE — 99490 CHRNC CARE MGMT STAFF 1ST 20: CPT | Mod: S$PBB,,, | Performed by: FAMILY MEDICINE

## 2019-11-12 PROBLEM — G90.511 COMPLEX REGIONAL PAIN SYNDROME TYPE 1 OF RIGHT UPPER EXTREMITY: Status: ACTIVE | Noted: 2019-11-12

## 2019-11-12 PROBLEM — M79.2 NEURITIS: Status: ACTIVE | Noted: 2019-11-12

## 2019-11-12 PROBLEM — M79.89 SWELLING OF LIMB: Status: ACTIVE | Noted: 2019-11-12

## 2019-11-30 ENCOUNTER — EXTERNAL CHRONIC CARE MANAGEMENT (OUTPATIENT)
Dept: PRIMARY CARE CLINIC | Facility: CLINIC | Age: 69
End: 2019-11-30
Payer: MEDICARE

## 2019-11-30 PROCEDURE — 99490 CHRNC CARE MGMT STAFF 1ST 20: CPT | Mod: S$PBB,,, | Performed by: FAMILY MEDICINE

## 2019-11-30 PROCEDURE — 99490 CHRNC CARE MGMT STAFF 1ST 20: CPT | Mod: PBBFAC,PN | Performed by: FAMILY MEDICINE

## 2019-11-30 PROCEDURE — 99490 PR CHRONIC CARE MGMT, 1ST 20 MIN: ICD-10-PCS | Mod: S$PBB,,, | Performed by: FAMILY MEDICINE

## 2019-12-05 RX ORDER — PROMETHAZINE HYDROCHLORIDE 25 MG/1
TABLET ORAL
Qty: 60 TABLET | Refills: 3 | Status: SHIPPED | OUTPATIENT
Start: 2019-12-05

## 2019-12-31 ENCOUNTER — EXTERNAL CHRONIC CARE MANAGEMENT (OUTPATIENT)
Dept: PRIMARY CARE CLINIC | Facility: CLINIC | Age: 69
End: 2019-12-31
Payer: MEDICARE

## 2019-12-31 PROCEDURE — 99490 CHRNC CARE MGMT STAFF 1ST 20: CPT | Mod: S$PBB,,, | Performed by: FAMILY MEDICINE

## 2019-12-31 PROCEDURE — 99490 PR CHRONIC CARE MGMT, 1ST 20 MIN: ICD-10-PCS | Mod: S$PBB,,, | Performed by: FAMILY MEDICINE

## 2019-12-31 PROCEDURE — 99490 CHRNC CARE MGMT STAFF 1ST 20: CPT | Mod: PBBFAC,PN | Performed by: FAMILY MEDICINE

## 2020-01-03 ENCOUNTER — OFFICE VISIT (OUTPATIENT)
Dept: PRIMARY CARE CLINIC | Facility: CLINIC | Age: 70
End: 2020-01-03
Payer: MEDICARE

## 2020-01-03 VITALS
HEART RATE: 66 BPM | TEMPERATURE: 98 F | SYSTOLIC BLOOD PRESSURE: 138 MMHG | BODY MASS INDEX: 36.82 KG/M2 | WEIGHT: 263 LBS | HEIGHT: 71 IN | OXYGEN SATURATION: 96 % | DIASTOLIC BLOOD PRESSURE: 78 MMHG | RESPIRATION RATE: 18 BRPM

## 2020-01-03 DIAGNOSIS — M51.36 DDD (DEGENERATIVE DISC DISEASE), LUMBAR: ICD-10-CM

## 2020-01-03 DIAGNOSIS — J45.21 MILD INTERMITTENT ASTHMA WITH ACUTE EXACERBATION: Primary | ICD-10-CM

## 2020-01-03 DIAGNOSIS — N18.30 CHRONIC KIDNEY DISEASE, STAGE III (MODERATE): ICD-10-CM

## 2020-01-03 PROCEDURE — 99999 PR PBB SHADOW E&M-EST. PATIENT-LVL III: ICD-10-PCS | Mod: PBBFAC,,, | Performed by: FAMILY MEDICINE

## 2020-01-03 PROCEDURE — 1126F AMNT PAIN NOTED NONE PRSNT: CPT | Mod: ,,, | Performed by: FAMILY MEDICINE

## 2020-01-03 PROCEDURE — 1126F PR PAIN SEVERITY QUANTIFIED, NO PAIN PRESENT: ICD-10-PCS | Mod: ,,, | Performed by: FAMILY MEDICINE

## 2020-01-03 PROCEDURE — 1159F MED LIST DOCD IN RCRD: CPT | Mod: ,,, | Performed by: FAMILY MEDICINE

## 2020-01-03 PROCEDURE — 99999 PR PBB SHADOW E&M-EST. PATIENT-LVL III: CPT | Mod: PBBFAC,,, | Performed by: FAMILY MEDICINE

## 2020-01-03 PROCEDURE — 99214 OFFICE O/P EST MOD 30 MIN: CPT | Mod: S$PBB,,, | Performed by: FAMILY MEDICINE

## 2020-01-03 PROCEDURE — 94640 AIRWAY INHALATION TREATMENT: CPT | Mod: PBBFAC,PN

## 2020-01-03 PROCEDURE — 1159F PR MEDICATION LIST DOCUMENTED IN MEDICAL RECORD: ICD-10-PCS | Mod: ,,, | Performed by: FAMILY MEDICINE

## 2020-01-03 PROCEDURE — 99213 OFFICE O/P EST LOW 20 MIN: CPT | Mod: PBBFAC,PN | Performed by: FAMILY MEDICINE

## 2020-01-03 PROCEDURE — 96372 THER/PROPH/DIAG INJ SC/IM: CPT | Mod: PBBFAC,59,PN

## 2020-01-03 PROCEDURE — 99214 PR OFFICE/OUTPT VISIT, EST, LEVL IV, 30-39 MIN: ICD-10-PCS | Mod: S$PBB,,, | Performed by: FAMILY MEDICINE

## 2020-01-03 RX ORDER — ALBUTEROL SULFATE 90 UG/1
2 AEROSOL, METERED RESPIRATORY (INHALATION) EVERY 4 HOURS PRN
Qty: 1 INHALER | Refills: 5 | Status: SHIPPED | OUTPATIENT
Start: 2020-01-03 | End: 2022-01-19 | Stop reason: SDUPTHER

## 2020-01-03 RX ORDER — ALBUTEROL SULFATE 0.83 MG/ML
2.5 SOLUTION RESPIRATORY (INHALATION) EVERY 4 HOURS PRN
Qty: 150 ML | Refills: 3 | Status: SHIPPED | OUTPATIENT
Start: 2020-01-03 | End: 2022-01-19 | Stop reason: SDUPTHER

## 2020-01-03 RX ORDER — TRAMADOL HYDROCHLORIDE 50 MG/1
50 TABLET ORAL
Qty: 30 TABLET | Refills: 1 | Status: SHIPPED | OUTPATIENT
Start: 2020-01-03 | End: 2020-03-04 | Stop reason: SDUPTHER

## 2020-01-03 RX ORDER — PREDNISONE 20 MG/1
TABLET ORAL
Qty: 10 TABLET | Refills: 0 | Status: SHIPPED | OUTPATIENT
Start: 2020-01-03 | End: 2020-01-10

## 2020-01-03 RX ORDER — ALBUTEROL SULFATE 2.5 MG/.5ML
2.5 SOLUTION RESPIRATORY (INHALATION)
Status: COMPLETED | OUTPATIENT
Start: 2020-01-03 | End: 2020-01-03

## 2020-01-03 RX ORDER — TRIAMCINOLONE ACETONIDE 40 MG/ML
80 INJECTION, SUSPENSION INTRA-ARTICULAR; INTRAMUSCULAR
Status: COMPLETED | OUTPATIENT
Start: 2020-01-03 | End: 2020-01-03

## 2020-01-03 RX ADMIN — TRIAMCINOLONE ACETONIDE 80 MG: 40 INJECTION, SUSPENSION INTRA-ARTICULAR; INTRAMUSCULAR at 12:01

## 2020-01-03 RX ADMIN — ALBUTEROL SULFATE 2.5 MG: 2.5 SOLUTION RESPIRATORY (INHALATION) at 10:01

## 2020-01-03 NOTE — PROGRESS NOTES
Patient identified by name and date of birth. Allergies reviewed. Nebulizer treatment administered as ordered, tolerated well by pt.  Pretreatment  /78 P 60 R20  Post treatment /72 P 60 R 20 SpO2 100%  Treatment tolerated well by pt.  Injection administered by aseptic technique, tolerated well by pt

## 2020-01-03 NOTE — PROGRESS NOTES
Subjective:       Patient ID: Sveta العراقي is a 69 y.o. female.    Chief Complaint: Cough and Shortness of Breath (says she gets SOB after walking )    Started wheezing and coughing while walking down dirt road on recent trip to [facility]. Cough is nonproductive, and inhaler was . No fever or chills. Has a nebulizer, but does not work consistently    Shortness of Breath   This is a recurrent problem. The current episode started in the past 7 days. The problem occurs daily. The problem has been waxing and waning. The average episode lasts 5 minutes. Associated symptoms include wheezing. Pertinent negatives include no abdominal pain, chest pain, claudication, coryza, ear pain, fever, headaches, hemoptysis, leg pain, leg swelling, neck pain, orthopnea, PND, rash, rhinorrhea, sore throat, sputum production, swollen glands, syncope or vomiting. The symptoms are aggravated by any activity. The patient has no known risk factors for DVT/PE. She has tried leukotriene antagonists, rest and beta agonist inhalers for the symptoms. The treatment provided no relief. Her past medical history is significant for allergies, asthma, DVT, PE and pneumonia. There is no history of aspirin allergies, bronchiolitis, CAD, chronic lung disease, COPD, a heart failure or a recent surgery.     Review of Systems   Constitutional: Negative for fever.   HENT: Negative for ear pain, rhinorrhea and sore throat.    Respiratory: Positive for shortness of breath and wheezing. Negative for hemoptysis and sputum production.    Cardiovascular: Negative for chest pain, orthopnea, claudication, leg swelling, syncope and PND.   Gastrointestinal: Negative for abdominal pain and vomiting.   Musculoskeletal: Negative for neck pain.   Skin: Negative for rash.   Allergic/Immunologic: Negative for immunocompromised state.   Neurological: Negative for headaches.       Objective:      Vitals:    20 0949   BP: 138/78   BP Location: Left arm  "  Patient Position: Sitting   BP Method: Large (Manual)   Pulse: 66   Resp: 18   Temp: 98.2 °F (36.8 °C)   TempSrc: Oral   SpO2: 96%   Weight: 119.3 kg (263 lb)   Height: 5' 11" (1.803 m)     Physical Exam   Constitutional: She is oriented to person, place, and time. She appears well-developed and well-nourished.   HENT:   Head: Normocephalic and atraumatic.   Cardiovascular: Normal rate, regular rhythm and normal heart sounds.   Pulmonary/Chest: Effort normal. No respiratory distress. She has decreased breath sounds. She has wheezes in the right lower field and the left lower field. She has no rhonchi. She has no rales.   Musculoskeletal: She exhibits no edema.   Neurological: She is alert and oriented to person, place, and time.   Skin: Skin is warm and dry.   Nursing note and vitals reviewed.      Lab Results   Component Value Date    WBC 6.90 07/19/2019    HGB 11.4 (L) 07/19/2019    HCT 34.1 (L) 07/19/2019     07/19/2019    CHOL 173 08/08/2018    TRIG 77 08/08/2018    HDL 89 (H) 08/08/2018    ALT 15 07/19/2019    AST 21 07/19/2019     (L) 07/19/2019    K 5.2 (H) 07/19/2019     07/19/2019    CREATININE 1.3 07/19/2019    BUN 34 (H) 07/19/2019    CO2 25 07/19/2019    TSH 2.310 12/11/2017    INR 1.1 07/19/2019    HGBA1C 5.1 12/11/2017      Assessment:       1. Mild intermittent asthma with acute exacerbation    2. Chronic kidney disease, stage III (moderate)        Plan:       Mild intermittent asthma with acute exacerbation  -     X-Ray Chest PA And Lateral; Future; Expected date: 01/03/2020  -     NEBULIZER FOR HOME USE  -     albuterol sulfate nebulizer solution 2.5 mg  -     triamcinolone acetonide injection 80 mg  -     albuterol (PROVENTIL) 2.5 mg /3 mL (0.083 %) nebulizer solution; Take 3 mLs (2.5 mg total) by nebulization every 4 (four) hours as needed for Wheezing or Shortness of Breath. Rescue  Dispense: 150 mL; Refill: 3  -     predniSONE (DELTASONE) 20 MG tablet; Take 2 tablets (40 mg " total) by mouth once daily for 3 days, THEN 1 tablet (20 mg total) once daily for 4 days.  Dispense: 10 tablet; Refill: 0  -     albuterol (PROVENTIL/VENTOLIN HFA) 90 mcg/actuation inhaler; Inhale 2 puffs into the lungs every 4 (four) hours as needed for Wheezing or Shortness of Breath. Rescue  Dispense: 1 Inhaler; Refill: 5  CXR clear. Much improved air movement after neb treatment. Start prednisone tomorrow, use albuterol as needed  Chronic kidney disease, stage III (moderate)      Medication List with Changes/Refills   New Medications    ALBUTEROL (PROVENTIL) 2.5 MG /3 ML (0.083 %) NEBULIZER SOLUTION    Take 3 mLs (2.5 mg total) by nebulization every 4 (four) hours as needed for Wheezing or Shortness of Breath. Rescue    ALBUTEROL (PROVENTIL/VENTOLIN HFA) 90 MCG/ACTUATION INHALER    Inhale 2 puffs into the lungs every 4 (four) hours as needed for Wheezing or Shortness of Breath. Rescue    PREDNISONE (DELTASONE) 20 MG TABLET    Take 2 tablets (40 mg total) by mouth once daily for 3 days, THEN 1 tablet (20 mg total) once daily for 4 days.   Current Medications    ALENDRONATE (FOSAMAX) 35 MG TABLET    TAKE 1 TABLET EVERY 7 DAYS    ATORVASTATIN (LIPITOR) 20 MG TABLET    Take 1 tablet by mouth once daily.    CALCIUM CARBONATE-VIT D3- MG CALCIUM- 400 UNIT TAB    Take 1 tablet by mouth 2 (two) times daily.    FUROSEMIDE (LASIX) 20 MG TABLET    TAKE 1 TABLET DAILY    GABAPENTIN (NEURONTIN) 300 MG CAPSULE    TAKE 1 CAPSULE THREE TIMES A DAY    LIDOCAINE-PRILOCAINE (EMLA) CREAM    Apply topically 3 (three) times daily as needed.    LISINOPRIL (PRINIVIL,ZESTRIL) 20 MG TABLET        METHOCARBAMOL (ROBAXIN) 750 MG TAB    Take 750 mg by mouth daily as needed.     METOPROLOL SUCCINATE (TOPROL-XL) 25 MG 24 HR TABLET    Take 25 mg by mouth once daily.    MIDODRINE (PROAMATINE) 2.5 MG TAB    Take 2.5 mg by mouth 2 (two) times daily.     MONTELUKAST (SINGULAIR) 10 MG TABLET    TAKE 1 TABLET ONCE DAILY IN THE EVENING     PANTOPRAZOLE (PROTONIX) 40 MG TABLET    Take 40 mg by mouth once daily.    POTASSIUM CHLORIDE (KLOR-CON) 8 MEQ TBSR    TAKE 1 TABLET DAILY    PRIMIDONE (MYSOLINE) 50 MG TAB    Take 50 mg by mouth every evening.    PROMETHAZINE (PHENERGAN) 25 MG TABLET    TAKE 1 TABLET EVERY 6 HOURS AS NEEDED FOR NAUSEA    RANITIDINE (ZANTAC) 150 MG TABLET    Take 2 tablets (300 mg total) by mouth nightly.    RIVAROXABAN (XARELTO) 20 MG TAB    Take 20 mg by mouth once daily.    SERTRALINE (ZOLOFT) 100 MG TABLET    TAKE 1 TABLET DAILY    TRAMADOL (ULTRAM) 50 MG TABLET    Take 50 mg by mouth every 24 hours as needed.     TRAZODONE (DESYREL) 100 MG TABLET    TAKE 1 TABLET DAILY AT BEDTIME   Discontinued Medications    ALBUTEROL (PROVENTIL/VENTOLIN HFA) 90 MCG/ACTUATION INHALER    Inhale 2 puffs into the lungs every 6 (six) hours as needed. Rescue

## 2020-01-06 PROBLEM — M79.2 NEURITIS: Status: RESOLVED | Noted: 2019-11-12 | Resolved: 2020-01-06

## 2020-01-06 PROBLEM — G90.511 COMPLEX REGIONAL PAIN SYNDROME TYPE 1 OF RIGHT UPPER EXTREMITY: Status: RESOLVED | Noted: 2019-11-12 | Resolved: 2020-01-06

## 2020-01-06 PROBLEM — M79.89 SWELLING OF LIMB: Status: RESOLVED | Noted: 2019-11-12 | Resolved: 2020-01-06

## 2020-01-14 ENCOUNTER — PATIENT MESSAGE (OUTPATIENT)
Dept: PRIMARY CARE CLINIC | Facility: CLINIC | Age: 70
End: 2020-01-14

## 2020-01-14 DIAGNOSIS — Z12.31 ENCOUNTER FOR SCREENING MAMMOGRAM FOR BREAST CANCER: Primary | ICD-10-CM

## 2020-01-15 RX ORDER — MONTELUKAST SODIUM 10 MG/1
TABLET ORAL
Qty: 90 TABLET | Refills: 3 | Status: SHIPPED | OUTPATIENT
Start: 2020-01-15 | End: 2021-01-12

## 2020-01-31 ENCOUNTER — EXTERNAL CHRONIC CARE MANAGEMENT (OUTPATIENT)
Dept: PRIMARY CARE CLINIC | Facility: CLINIC | Age: 70
End: 2020-01-31
Payer: MEDICARE

## 2020-01-31 PROCEDURE — 99490 CHRNC CARE MGMT STAFF 1ST 20: CPT | Mod: PBBFAC,PN | Performed by: FAMILY MEDICINE

## 2020-01-31 PROCEDURE — 99490 PR CHRONIC CARE MGMT, 1ST 20 MIN: ICD-10-PCS | Mod: S$PBB,,, | Performed by: FAMILY MEDICINE

## 2020-01-31 PROCEDURE — 99490 CHRNC CARE MGMT STAFF 1ST 20: CPT | Mod: S$PBB,,, | Performed by: FAMILY MEDICINE

## 2020-02-03 RX ORDER — FUROSEMIDE 20 MG/1
TABLET ORAL
Qty: 90 TABLET | Refills: 3 | Status: SHIPPED | OUTPATIENT
Start: 2020-02-03 | End: 2020-09-04

## 2020-02-10 ENCOUNTER — OFFICE VISIT (OUTPATIENT)
Dept: PRIMARY CARE CLINIC | Facility: CLINIC | Age: 70
End: 2020-02-10
Payer: MEDICARE

## 2020-02-10 VITALS
BODY MASS INDEX: 35.77 KG/M2 | DIASTOLIC BLOOD PRESSURE: 82 MMHG | WEIGHT: 255.5 LBS | OXYGEN SATURATION: 99 % | HEIGHT: 71 IN | RESPIRATION RATE: 18 BRPM | TEMPERATURE: 98 F | SYSTOLIC BLOOD PRESSURE: 128 MMHG | HEART RATE: 84 BPM

## 2020-02-10 DIAGNOSIS — B34.9 VIRAL SYNDROME: Primary | ICD-10-CM

## 2020-02-10 DIAGNOSIS — I48.0 PAROXYSMAL ATRIAL FIBRILLATION: ICD-10-CM

## 2020-02-10 DIAGNOSIS — M51.36 DDD (DEGENERATIVE DISC DISEASE), LUMBAR: ICD-10-CM

## 2020-02-10 DIAGNOSIS — N18.30 CHRONIC KIDNEY DISEASE, STAGE III (MODERATE): ICD-10-CM

## 2020-02-10 PROCEDURE — 99214 OFFICE O/P EST MOD 30 MIN: CPT | Mod: S$PBB,,, | Performed by: FAMILY MEDICINE

## 2020-02-10 PROCEDURE — 99214 PR OFFICE/OUTPT VISIT, EST, LEVL IV, 30-39 MIN: ICD-10-PCS | Mod: S$PBB,,, | Performed by: FAMILY MEDICINE

## 2020-02-10 PROCEDURE — 99213 OFFICE O/P EST LOW 20 MIN: CPT | Mod: PBBFAC,PN | Performed by: FAMILY MEDICINE

## 2020-02-10 PROCEDURE — 99999 PR PBB SHADOW E&M-EST. PATIENT-LVL III: CPT | Mod: PBBFAC,,, | Performed by: FAMILY MEDICINE

## 2020-02-10 PROCEDURE — 99999 PR PBB SHADOW E&M-EST. PATIENT-LVL III: ICD-10-PCS | Mod: PBBFAC,,, | Performed by: FAMILY MEDICINE

## 2020-02-10 RX ORDER — DIPHENOXYLATE HYDROCHLORIDE AND ATROPINE SULFATE 2.5; .025 MG/1; MG/1
1 TABLET ORAL 4 TIMES DAILY PRN
Qty: 20 TABLET | Refills: 0 | Status: SHIPPED | OUTPATIENT
Start: 2020-02-10 | End: 2020-07-29

## 2020-02-10 NOTE — PROGRESS NOTES
"Subjective:       Patient ID: Sveta العراقي is a 69 y.o. female.    Chief Complaint: Sore Throat (since Thursday ); Cough; and Diarrhea    Sore throat, cough and diarrhea since late last week. Chills and sweats, no fever.    Review of Systems   Constitutional: Positive for chills and diaphoresis. Negative for fever.   HENT: Positive for sore throat.    Respiratory: Positive for cough. Negative for shortness of breath and wheezing.    Gastrointestinal: Positive for diarrhea and nausea. Negative for vomiting.   Endocrine: Negative for polydipsia and polyuria.   Genitourinary: Negative for dysuria.   Musculoskeletal: Positive for arthralgias. Negative for myalgias.   Skin: Negative for rash and wound.   Psychiatric/Behavioral: Negative for agitation and confusion.       Objective:      Vitals:    02/10/20 1150   BP: 128/82   BP Location: Left arm   Patient Position: Sitting   BP Method: Large (Manual)   Pulse: 84   Resp: 18   Temp: 98.4 °F (36.9 °C)   TempSrc: Oral   SpO2: 99%   Weight: 115.9 kg (255 lb 8.2 oz)   Height: 5' 11" (1.803 m)     Physical Exam   Constitutional: She is oriented to person, place, and time. She appears well-developed and well-nourished.   HENT:   Head: Normocephalic and atraumatic.   Right Ear: Tympanic membrane normal.   Left Ear: Tympanic membrane normal.   Nose: Right sinus exhibits no maxillary sinus tenderness. Left sinus exhibits no maxillary sinus tenderness.   Mouth/Throat: Oropharynx is clear and moist.   Cardiovascular: Normal rate, regular rhythm and normal heart sounds.   Pulmonary/Chest: Effort normal and breath sounds normal.   Musculoskeletal: She exhibits no edema.   Neurological: She is alert and oriented to person, place, and time.   Skin: Skin is warm and dry.   Nursing note and vitals reviewed.      Lab Results   Component Value Date    WBC 11.80 01/10/2020    HGB 12.2 01/10/2020    HCT 36.9 (L) 01/10/2020     01/10/2020    CHOL 173 08/08/2018    TRIG 77 " 08/08/2018    HDL 89 (H) 08/08/2018    ALT 16 01/10/2020    AST 19 01/10/2020     01/10/2020    K 3.5 01/10/2020     (L) 01/10/2020    CREATININE 1.2 01/10/2020    BUN 28 (H) 01/10/2020    CO2 25 01/10/2020    TSH 2.310 12/11/2017    INR 1.1 07/19/2019    HGBA1C 5.1 12/11/2017      Assessment:       1. Viral syndrome    2. Chronic kidney disease, stage III (moderate)    3. Paroxysmal atrial fibrillation    4. DDD (degenerative disc disease), lumbar        Plan:       Viral syndrome  -     diphenoxylate-atropine 2.5-0.025 mg (LOMOTIL) 2.5-0.025 mg per tablet; Take 1 tablet by mouth 4 (four) times daily as needed for Diarrhea.  Dispense: 20 tablet; Refill: 0  Treat symptomatically. Push fluids, BRAT diet as tolerated. RTC for any new or worsening symptoms, or if no better in a few days  Chronic kidney disease, stage III (moderate)  -     Ambulatory referral/consult to Outpatient Case Management; Future; Expected date: 02/17/2020    Paroxysmal atrial fibrillation  -     Ambulatory referral/consult to Outpatient Case Management; Future; Expected date: 02/17/2020    DDD (degenerative disc disease), lumbar  -     Ambulatory referral/consult to Outpatient Case Management; Future; Expected date: 02/17/2020      Medication List with Changes/Refills   New Medications    DIPHENOXYLATE-ATROPINE 2.5-0.025 MG (LOMOTIL) 2.5-0.025 MG PER TABLET    Take 1 tablet by mouth 4 (four) times daily as needed for Diarrhea.   Current Medications    ALBUTEROL (PROVENTIL) 2.5 MG /3 ML (0.083 %) NEBULIZER SOLUTION    Take 3 mLs (2.5 mg total) by nebulization every 4 (four) hours as needed for Wheezing or Shortness of Breath. Rescue    ALBUTEROL (PROVENTIL/VENTOLIN HFA) 90 MCG/ACTUATION INHALER    Inhale 2 puffs into the lungs every 4 (four) hours as needed for Wheezing or Shortness of Breath. Rescue    ALENDRONATE (FOSAMAX) 35 MG TABLET    TAKE 1 TABLET EVERY 7 DAYS    ATORVASTATIN (LIPITOR) 20 MG TABLET    Take 1 tablet by mouth  once daily.    CALCIUM CARBONATE-VIT D3- MG CALCIUM- 400 UNIT TAB    Take 1 tablet by mouth 2 (two) times daily.    FUROSEMIDE (LASIX) 20 MG TABLET    TAKE 1 TABLET DAILY    GABAPENTIN (NEURONTIN) 300 MG CAPSULE    TAKE 1 CAPSULE THREE TIMES A DAY    LIDOCAINE-PRILOCAINE (EMLA) CREAM    Apply topically 3 (three) times daily as needed.    LISINOPRIL (PRINIVIL,ZESTRIL) 20 MG TABLET    Take 20 mg by mouth once daily.     METHOCARBAMOL (ROBAXIN) 750 MG TAB    Take 750 mg by mouth daily as needed.     METOPROLOL SUCCINATE (TOPROL-XL) 25 MG 24 HR TABLET    Take 25 mg by mouth once daily.    MIDODRINE (PROAMATINE) 2.5 MG TAB    Take 2.5 mg by mouth 2 (two) times daily.     MONTELUKAST (SINGULAIR) 10 MG TABLET    TAKE 1 TABLET ONCE DAILY IN THE EVENING    PANTOPRAZOLE (PROTONIX) 40 MG TABLET    Take 40 mg by mouth once daily.    POTASSIUM CHLORIDE (KLOR-CON) 8 MEQ TBSR    TAKE 1 TABLET DAILY    PRIMIDONE (MYSOLINE) 50 MG TAB    Take 50 mg by mouth every evening.    PROMETHAZINE (PHENERGAN) 25 MG TABLET    TAKE 1 TABLET EVERY 6 HOURS AS NEEDED FOR NAUSEA    RIVAROXABAN (XARELTO) 20 MG TAB    Take 20 mg by mouth once daily.    SERTRALINE (ZOLOFT) 100 MG TABLET    TAKE 1 TABLET DAILY    TRAMADOL (ULTRAM) 50 MG TABLET    Take 1 tablet (50 mg total) by mouth every 24 hours as needed for Pain.    TRAZODONE (DESYREL) 100 MG TABLET    TAKE 1 TABLET DAILY AT BEDTIME   Discontinued Medications    RANITIDINE (ZANTAC) 150 MG TABLET    Take 2 tablets (300 mg total) by mouth nightly.

## 2020-02-29 ENCOUNTER — EXTERNAL CHRONIC CARE MANAGEMENT (OUTPATIENT)
Dept: PRIMARY CARE CLINIC | Facility: CLINIC | Age: 70
End: 2020-02-29
Payer: MEDICARE

## 2020-02-29 PROCEDURE — 99490 CHRNC CARE MGMT STAFF 1ST 20: CPT | Mod: PBBFAC,PN | Performed by: FAMILY MEDICINE

## 2020-02-29 PROCEDURE — 99490 CHRNC CARE MGMT STAFF 1ST 20: CPT | Mod: S$PBB,,, | Performed by: FAMILY MEDICINE

## 2020-02-29 PROCEDURE — 99490 PR CHRONIC CARE MGMT, 1ST 20 MIN: ICD-10-PCS | Mod: S$PBB,,, | Performed by: FAMILY MEDICINE

## 2020-03-02 ENCOUNTER — OUTPATIENT CASE MANAGEMENT (OUTPATIENT)
Dept: ADMINISTRATIVE | Facility: OTHER | Age: 70
End: 2020-03-02

## 2020-03-04 DIAGNOSIS — M51.36 DDD (DEGENERATIVE DISC DISEASE), LUMBAR: ICD-10-CM

## 2020-03-04 RX ORDER — TRAMADOL HYDROCHLORIDE 50 MG/1
50 TABLET ORAL
Qty: 30 TABLET | Refills: 1 | Status: CANCELLED | OUTPATIENT
Start: 2020-03-04

## 2020-03-04 RX ORDER — CYCLOBENZAPRINE HCL 10 MG
10 TABLET ORAL DAILY PRN
Qty: 90 TABLET | Refills: 1 | Status: SHIPPED | OUTPATIENT
Start: 2020-03-04 | End: 2022-07-13 | Stop reason: SDUPTHER

## 2020-03-04 RX ORDER — TRAMADOL HYDROCHLORIDE 50 MG/1
50 TABLET ORAL
Qty: 90 TABLET | Refills: 0 | Status: SHIPPED | OUTPATIENT
Start: 2020-03-04 | End: 2020-12-16 | Stop reason: SDUPTHER

## 2020-03-04 RX ORDER — CYCLOBENZAPRINE HCL 10 MG
10 TABLET ORAL DAILY PRN
COMMUNITY
End: 2020-03-04 | Stop reason: SDUPTHER

## 2020-03-04 NOTE — TELEPHONE ENCOUNTER
Beatriz Avila, ANDRADE             Pt MRN# [id number]   PT DR: Kash Colin     Patient is in need of a refill of her   tramadol 50 mg. [facility]. Patient stated she only has 2 pills left to take. Please call patient if they need to be seen in order to get refill.     Thanks   Beatriz Webster LPN Care- Coordinator   [facility]

## 2020-03-06 ENCOUNTER — PATIENT MESSAGE (OUTPATIENT)
Dept: PRIMARY CARE CLINIC | Facility: CLINIC | Age: 70
End: 2020-03-06

## 2020-03-31 ENCOUNTER — EXTERNAL CHRONIC CARE MANAGEMENT (OUTPATIENT)
Dept: PRIMARY CARE CLINIC | Facility: CLINIC | Age: 70
End: 2020-03-31
Payer: MEDICARE

## 2020-03-31 PROCEDURE — 99490 PR CHRONIC CARE MGMT, 1ST 20 MIN: ICD-10-PCS | Mod: S$PBB,,, | Performed by: FAMILY MEDICINE

## 2020-03-31 PROCEDURE — 99490 CHRNC CARE MGMT STAFF 1ST 20: CPT | Mod: PBBFAC,PN | Performed by: FAMILY MEDICINE

## 2020-03-31 PROCEDURE — 99490 CHRNC CARE MGMT STAFF 1ST 20: CPT | Mod: S$PBB,,, | Performed by: FAMILY MEDICINE

## 2020-04-30 ENCOUNTER — EXTERNAL CHRONIC CARE MANAGEMENT (OUTPATIENT)
Dept: PRIMARY CARE CLINIC | Facility: CLINIC | Age: 70
End: 2020-04-30
Payer: MEDICARE

## 2020-04-30 PROCEDURE — 99490 CHRNC CARE MGMT STAFF 1ST 20: CPT | Mod: S$PBB,,, | Performed by: FAMILY MEDICINE

## 2020-04-30 PROCEDURE — 99490 PR CHRONIC CARE MGMT, 1ST 20 MIN: ICD-10-PCS | Mod: S$PBB,,, | Performed by: FAMILY MEDICINE

## 2020-04-30 PROCEDURE — 99490 CHRNC CARE MGMT STAFF 1ST 20: CPT | Mod: PBBFAC,PN | Performed by: FAMILY MEDICINE

## 2020-05-12 RX ORDER — SERTRALINE HYDROCHLORIDE 100 MG/1
TABLET, FILM COATED ORAL
Qty: 90 TABLET | Refills: 3 | Status: SHIPPED | OUTPATIENT
Start: 2020-05-12 | End: 2021-04-19

## 2020-05-31 ENCOUNTER — EXTERNAL CHRONIC CARE MANAGEMENT (OUTPATIENT)
Dept: PRIMARY CARE CLINIC | Facility: CLINIC | Age: 70
End: 2020-05-31
Payer: MEDICARE

## 2020-05-31 PROCEDURE — 99490 PR CHRONIC CARE MGMT, 1ST 20 MIN: ICD-10-PCS | Mod: S$PBB,,, | Performed by: FAMILY MEDICINE

## 2020-05-31 PROCEDURE — 99490 CHRNC CARE MGMT STAFF 1ST 20: CPT | Mod: PBBFAC,PN | Performed by: FAMILY MEDICINE

## 2020-05-31 PROCEDURE — 99490 CHRNC CARE MGMT STAFF 1ST 20: CPT | Mod: S$PBB,,, | Performed by: FAMILY MEDICINE

## 2020-06-30 ENCOUNTER — EXTERNAL CHRONIC CARE MANAGEMENT (OUTPATIENT)
Dept: PRIMARY CARE CLINIC | Facility: CLINIC | Age: 70
End: 2020-06-30
Payer: MEDICARE

## 2020-06-30 PROCEDURE — 99490 CHRNC CARE MGMT STAFF 1ST 20: CPT | Mod: PBBFAC,PN | Performed by: FAMILY MEDICINE

## 2020-06-30 PROCEDURE — 99490 CHRNC CARE MGMT STAFF 1ST 20: CPT | Mod: S$PBB,,, | Performed by: FAMILY MEDICINE

## 2020-06-30 PROCEDURE — 99490 PR CHRONIC CARE MGMT, 1ST 20 MIN: ICD-10-PCS | Mod: S$PBB,,, | Performed by: FAMILY MEDICINE

## 2020-07-03 DIAGNOSIS — Z12.31 ENCOUNTER FOR SCREENING MAMMOGRAM FOR BREAST CANCER: ICD-10-CM

## 2020-07-27 ENCOUNTER — HOSPITAL ENCOUNTER (OUTPATIENT)
Dept: RADIOLOGY | Facility: HOSPITAL | Age: 70
Discharge: HOME | End: 2020-07-27
Attending: FAMILY MEDICINE
Payer: MEDICARE

## 2020-07-27 DIAGNOSIS — Z12.31 ENCOUNTER FOR SCREENING MAMMOGRAM FOR BREAST CANCER: ICD-10-CM

## 2020-07-27 PROCEDURE — 77063 BREAST TOMOSYNTHESIS BI: CPT | Mod: 26,,, | Performed by: RADIOLOGY

## 2020-07-27 PROCEDURE — 77067 MAMMO DIGITAL SCREENING BILAT WITH TOMOSYNTHESIS_CAD: ICD-10-PCS | Mod: 26,,, | Performed by: RADIOLOGY

## 2020-07-27 PROCEDURE — 77067 SCR MAMMO BI INCL CAD: CPT | Mod: 26,,, | Performed by: RADIOLOGY

## 2020-07-27 PROCEDURE — 77067 SCR MAMMO BI INCL CAD: CPT | Mod: TC

## 2020-07-27 PROCEDURE — 77063 MAMMO DIGITAL SCREENING BILAT WITH TOMOSYNTHESIS_CAD: ICD-10-PCS | Mod: 26,,, | Performed by: RADIOLOGY

## 2020-07-29 ENCOUNTER — CLINICAL SUPPORT (OUTPATIENT)
Dept: PRIMARY CARE CLINIC | Facility: CLINIC | Age: 70
End: 2020-07-29
Payer: MEDICARE

## 2020-07-29 ENCOUNTER — OFFICE VISIT (OUTPATIENT)
Dept: PRIMARY CARE CLINIC | Facility: CLINIC | Age: 70
End: 2020-07-29
Payer: MEDICARE

## 2020-07-29 ENCOUNTER — OUTPATIENT CASE MANAGEMENT (OUTPATIENT)
Dept: ADMINISTRATIVE | Facility: OTHER | Age: 70
End: 2020-07-29

## 2020-07-29 VITALS
RESPIRATION RATE: 18 BRPM | TEMPERATURE: 98 F | HEART RATE: 66 BPM | WEIGHT: 260.13 LBS | DIASTOLIC BLOOD PRESSURE: 76 MMHG | OXYGEN SATURATION: 97 % | HEIGHT: 71 IN | SYSTOLIC BLOOD PRESSURE: 118 MMHG | BODY MASS INDEX: 36.42 KG/M2

## 2020-07-29 DIAGNOSIS — Z23 NEED FOR VACCINATION: ICD-10-CM

## 2020-07-29 DIAGNOSIS — N17.9 AKI (ACUTE KIDNEY INJURY): ICD-10-CM

## 2020-07-29 DIAGNOSIS — R10.84 GENERALIZED ABDOMINAL PAIN: Primary | ICD-10-CM

## 2020-07-29 DIAGNOSIS — K59.00 CONSTIPATION, UNSPECIFIED CONSTIPATION TYPE: ICD-10-CM

## 2020-07-29 DIAGNOSIS — M47.816 FACET ARTHRITIS OF LUMBAR REGION: ICD-10-CM

## 2020-07-29 LAB
ANION GAP SERPL CALC-SCNC: 13 MMOL/L (ref 8–16)
BUN SERPL-MCNC: 28 MG/DL (ref 8–23)
CALCIUM SERPL-MCNC: 9.9 MG/DL (ref 8.6–10)
CHLORIDE SERPL-SCNC: 103 MMOL/L (ref 101–111)
CO2 SERPL-SCNC: 22 MMOL/L (ref 23–29)
CREAT SERPL-MCNC: 1.3 MG/DL (ref 0.5–1.4)
EST. GFR  (AFRICAN AMERICAN): 48.7 ML/MIN/1.73 M^2
EST. GFR  (NON AFRICAN AMERICAN): 42.3 ML/MIN/1.73 M^2
GLUCOSE SERPL-MCNC: 102 MG/DL (ref 74–118)
POTASSIUM SERPL-SCNC: 4.4 MMOL/L (ref 3.5–5.1)
SODIUM SERPL-SCNC: 138 MMOL/L (ref 136–145)

## 2020-07-29 PROCEDURE — 36415 COLL VENOUS BLD VENIPUNCTURE: CPT | Mod: PBBFAC,PN

## 2020-07-29 PROCEDURE — 99214 PR OFFICE/OUTPT VISIT, EST, LEVL IV, 30-39 MIN: ICD-10-PCS | Mod: S$PBB,,, | Performed by: FAMILY MEDICINE

## 2020-07-29 PROCEDURE — 99215 OFFICE O/P EST HI 40 MIN: CPT | Mod: PBBFAC,PN,25 | Performed by: FAMILY MEDICINE

## 2020-07-29 PROCEDURE — 80048 BASIC METABOLIC PNL TOTAL CA: CPT

## 2020-07-29 PROCEDURE — 99999 PR PBB SHADOW E&M-EST. PATIENT-LVL V: ICD-10-PCS | Mod: PBBFAC,,, | Performed by: FAMILY MEDICINE

## 2020-07-29 PROCEDURE — G0009 ADMIN PNEUMOCOCCAL VACCINE: HCPCS | Mod: PBBFAC,PN

## 2020-07-29 PROCEDURE — 99999 PR PBB SHADOW E&M-EST. PATIENT-LVL V: CPT | Mod: PBBFAC,,, | Performed by: FAMILY MEDICINE

## 2020-07-29 PROCEDURE — 99214 OFFICE O/P EST MOD 30 MIN: CPT | Mod: S$PBB,,, | Performed by: FAMILY MEDICINE

## 2020-07-29 NOTE — PROGRESS NOTES
Verified pt ID using name and . NKDA. Administered pneumonia 23 in left deltoid per physician order using aseptic technique. Aspirated and no blood return noted. Pt tolerated well with no adverse reactions noted.

## 2020-07-29 NOTE — PROGRESS NOTES
"Subjective:       Patient ID: Sveta العراقي is a 70 y.o. female.    Chief Complaint: Dehydration (here for an ER follow up), Constipation, and Nausea    Has been extremely constipated for the past few days, having to strain extremely hard, has had to try to manually disimpact herself. Abdominal wall sore from straining. Nauseated, but no vomiting. No blood in stool. No fever or chills. Has had similar episodes in the past since 2008, has actually passed out from straining so hard in the past. Went to the ER last week after drinking from a carton of spoiled milk and discovering an unknown substance in the carton that "looked like a wad of paper towels," still under investigation. Vomited once, Cr 1.5 at that time. Says she is drinking lots of fluids, voiding regularly    Review of Systems   Constitutional: Negative for chills and fever.   Respiratory: Negative for shortness of breath.    Cardiovascular: Negative for chest pain.   Gastrointestinal: Positive for abdominal pain, constipation and nausea. Negative for blood in stool, diarrhea and vomiting.   Musculoskeletal: Positive for arthralgias.       Objective:      Vitals:    07/29/20 1042   BP: 118/76   BP Location: Left arm   Patient Position: Sitting   BP Method: Large (Manual)   Pulse: 66   Resp: 18   Temp: 98.2 °F (36.8 °C)   TempSrc: Oral   SpO2: 97%   Weight: 118 kg (260 lb 2.3 oz)   Height: 5' 11" (1.803 m)     Physical Exam  Vitals signs and nursing note reviewed.   Constitutional:       Appearance: She is well-developed.   HENT:      Head: Normocephalic and atraumatic.   Cardiovascular:      Rate and Rhythm: Normal rate and regular rhythm.      Heart sounds: Normal heart sounds.   Pulmonary:      Effort: Pulmonary effort is normal.      Breath sounds: Normal breath sounds.   Abdominal:      General: Abdomen is flat. Bowel sounds are normal. There is no distension.      Palpations: Abdomen is soft.      Tenderness: There is abdominal tenderness " (diffuse, mild, non-localizing). There is no guarding or rebound.   Skin:     General: Skin is warm and dry.   Neurological:      Mental Status: She is alert and oriented to person, place, and time.   Psychiatric:         Mood and Affect: Mood normal.         Behavior: Behavior normal.         Lab Results   Component Value Date    WBC 10.10 07/22/2020    HGB 13.6 07/22/2020    HCT 40.0 07/22/2020     07/22/2020    CHOL 173 08/08/2018    TRIG 77 08/08/2018    HDL 89 (H) 08/08/2018    ALT 14 07/22/2020    AST 22 07/22/2020     (L) 07/22/2020    K 4.6 07/22/2020    CL 94 (L) 07/22/2020    CREATININE 1.5 (H) 07/22/2020    BUN 50 (H) 07/22/2020    CO2 25 07/22/2020    TSH 2.310 12/11/2017    INR 1.1 07/19/2019    HGBA1C 5.1 12/11/2017      Assessment:       1. Generalized abdominal pain    2. Constipation, unspecified constipation type    3. NIKI (acute kidney injury)    4. Facet arthritis of lumbar region    5. Need for vaccination        Plan:       Generalized abdominal pain  Likely due to constipation  Constipation, unspecified constipation type  Ok to use OTC laxative occasionally prn  NIKI (acute kidney injury)  -     Basic metabolic panel; Future; Expected date: 07/29/2020  Encouraged increased fluid intake  Facet arthritis of lumbar region  -     Ambulatory referral/consult to Outpatient Case Management    Pneumovax today  Medication List with Changes/Refills   Current Medications    ALBUTEROL (PROVENTIL) 2.5 MG /3 ML (0.083 %) NEBULIZER SOLUTION    Take 3 mLs (2.5 mg total) by nebulization every 4 (four) hours as needed for Wheezing or Shortness of Breath. Rescue    ALBUTEROL (PROVENTIL/VENTOLIN HFA) 90 MCG/ACTUATION INHALER    Inhale 2 puffs into the lungs every 4 (four) hours as needed for Wheezing or Shortness of Breath. Rescue    ALENDRONATE (FOSAMAX) 35 MG TABLET    TAKE 1 TABLET EVERY 7 DAYS    ATORVASTATIN (LIPITOR) 20 MG TABLET    Take 1 tablet by mouth once daily.    CALCIUM CARBONATE-VIT  D3- MG CALCIUM- 400 UNIT TAB    Take 1 tablet by mouth 2 (two) times daily.    CYCLOBENZAPRINE (FLEXERIL) 10 MG TABLET    Take 1 tablet (10 mg total) by mouth daily as needed for Muscle spasms.    FUROSEMIDE (LASIX) 20 MG TABLET    TAKE 1 TABLET DAILY    GABAPENTIN (NEURONTIN) 300 MG CAPSULE    TAKE 1 CAPSULE THREE TIMES A DAY    LIDOCAINE-PRILOCAINE (EMLA) CREAM    Apply topically 3 (three) times daily as needed.    LISINOPRIL (PRINIVIL,ZESTRIL) 20 MG TABLET    Take 20 mg by mouth once daily.     METOPROLOL SUCCINATE (TOPROL-XL) 25 MG 24 HR TABLET    Take 25 mg by mouth once daily.    MIDODRINE (PROAMATINE) 2.5 MG TAB    Take 2.5 mg by mouth 2 (two) times daily.     MONTELUKAST (SINGULAIR) 10 MG TABLET    TAKE 1 TABLET ONCE DAILY IN THE EVENING    ONDANSETRON (ZOFRAN-ODT) 4 MG TBDL    Take 1 tablet (4 mg total) by mouth every 8 (eight) hours as needed.    POTASSIUM CHLORIDE (KLOR-CON) 8 MEQ TBSR    TAKE 1 TABLET DAILY    PRIMIDONE (MYSOLINE) 50 MG TAB    Take 50 mg by mouth every evening.    PROMETHAZINE (PHENERGAN) 25 MG TABLET    TAKE 1 TABLET EVERY 6 HOURS AS NEEDED FOR NAUSEA    RIVAROXABAN (XARELTO) 20 MG TAB    Take 20 mg by mouth once daily.    SERTRALINE (ZOLOFT) 100 MG TABLET    TAKE 1 TABLET DAILY    TRAMADOL (ULTRAM) 50 MG TABLET    Take 1 tablet (50 mg total) by mouth every 24 hours as needed for Pain.    TRAZODONE (DESYREL) 100 MG TABLET    TAKE 1 TABLET DAILY AT BEDTIME   Discontinued Medications    DIPHENOXYLATE-ATROPINE 2.5-0.025 MG (LOMOTIL) 2.5-0.025 MG PER TABLET    Take 1 tablet by mouth 4 (four) times daily as needed for Diarrhea.    METHOCARBAMOL (ROBAXIN) 750 MG TAB    Take 750 mg by mouth daily as needed.     PANTOPRAZOLE (PROTONIX) 40 MG TABLET    Take 40 mg by mouth once daily.

## 2020-07-29 NOTE — PATIENT INSTRUCTIONS
Constipation (Adult)  Constipation means that you have bowel movements that are less frequent than usual. Stools often become very hard and difficult to pass.  Constipation is very common. At some point in life it affects almost everyone. Since everyone's bowel habits are different, what is constipation to one person may not be to another. Your healthcare provider may do tests to diagnose constipation. It depends on what he or she finds when evaluating you.    Symptoms of constipation include:  · Abdominal pain  · Bloating  · Vomiting  · Painful bowel movements  · Itching, swelling, bleeding, or pain around the anus  Causes  Constipation can have many causes. These include:  · Diet low in fiber  · Too much dairy  · Not drinking enough liquids  · Lack of exercise or physical activity. This is especially true for older adults.  · Changes in lifestyle or daily routine, including pregnancy, aging, work, and travel  · Frequent use or misuse of laxatives  · Ignoring the urge to have a bowel movement or delaying it until later  · Medicines, such as certain prescription pain medicines, iron supplements, antacids, certain antidepressants, and calcium supplements  · Diseases like irritable bowel syndrome, bowel obstructions, stroke, diabetes, thyroid disease, Parkinson disease, hemorrhoids, and colon cancer  Complications  Potential complications of constipation can include:  · Hemorrhoids  · Rectal bleeding from hemorrhoids or anal fissures (skin tears)  · Hernias  · Dependency on laxatives  · Chronic constipation  · Fecal impaction  · Bowel obstruction or perforation  Home care  All treatment should be done after talking with your healthcare provider. This is especially true if you have another medical problems, are taking prescription medicines, or are an older adult. Treatment most often involves lifestyle changes. You may also need medicines. Your healthcare provider will tell you which will work best for you. Follow  the advice below to help avoid this problem in the future.  Lifestyle changes  These lifestyle changes can help prevent constipation:  · Diet. Eat a high-fiber diet, with fresh fruit and vegetables, and reduce dairy intake, meats, and processed foods  · Fluids. It's important to get enough fluids each day. Drink plenty of water when you eat more fiber. If you are on diet that limits the amount of fluid you can have, talk about this with your healthcare provider.  · Regular exercise. Check with your healthcare provider first.  Medications  Take any medicines as directed. Some laxatives are safe to use only every now and then. Others can be taken on a regular basis. Talk with your doctor or pharmacist if you have questions.  Prescription pain medicines can cause constipation. If you are taking this kind of medicine, ask your healthcare provider if you should also take a stool softener.  Medicines you may take to treat constipation include:  · Fiber supplements  · Stool softeners  · Laxatives  · Enemas  · Rectal suppositories  Follow-up care  Follow up with your healthcare provider if symptoms don't get better in the next few days. You may need to have more tests or see a specialist.  Call 911  Call 911 if any of these occur:  · Trouble breathing  · Stiff, rigid abdomen that is severely painful to touch  · Confusion  · Fainting or loss of consciousness  · Rapid heart rate  · Chest pain  When to seek medical advice  Call your healthcare provider right away if any of these occur:  · Fever over 100.4°F (38°C)  · Failure to resume normal bowel movements  · Pain in your abdomen or back gets worse  · Nausea or vomiting  · Swelling in your abdomen  · Blood in the stool  · Black, tarry stool  · Involuntary weight loss  · Weakness  Date Last Reviewed: 12/30/2015  © 8513-5806 [employer]. [address] [place] [address], [place] [address]. All rights reserved. This information is not intended as a substitute for  professional medical care. Always follow your healthcare professional's instructions.        Treating Constipation    Constipation is a common and often uncomfortable problem. Constipation means you have bowel movements fewer than 3 times per week, or strain to pass hard, dry stool. It can last a short time. Or it can be a problem that never seems to go away. The good news is that it can often be treated and controlled.  Eat more fiber  One of the best ways to help treat constipation is to increase your fiber intake. You can do this either through diet or by using fiber supplements. Fiber (in whole grains, fruits, and vegetables) adds bulk and absorbs water to soften the stool. This helps the stool pass through the colon more easily. When you increase your fiber intake, do it slowly to avoid side effects such as bloating. Also increase the amount of water that you drink. Eating more of the following foods can add fiber to your diet.  · High-fiber cereals  · Whole grains, bran, and brown rice  · Vegetables such as carrots, broccoli, and greens  · Fresh fruits (especially apples, pears, and dried fruits like raisins and apricots)  · Nuts and legumes (especially beans such as lentils, kidney beans, and lima beans)  Get physically active  Exercise helps improve the working of your colon which helps ease constipation. Try to get some physical activity every day. If you havent been active for a while, talk to your healthcare provider before starting again.  Laxatives  Your healthcare provider may suggest an over-the-counter product to help ease your constipation. He or she may suggest the use of bulk-forming agents or laxatives. The use of laxatives, if used as directed, is common and safe. Follow directions carefully when using them. See your healthcare provider for new-onset constipation, or long-term constipation, to rule out other causes such as medicines or thyroid disease.  Date Last Reviewed: 7/1/2016  © 2414-0588  [employer]. [address] [place] [address], [place] [address]. All rights reserved. This information is not intended as a substitute for professional medical care. Always follow your healthcare professional's instructions.        Established High Blood Pressure    High blood pressure (hypertension) is a chronic disease. Often, healthcare providers dont know what causes it. But it can be caused by certain health conditions and medicines.  If you have high blood pressure, you may not have any symptoms. If you do have symptoms, they may include headache, dizziness, changes in your vision, chest pain, and shortness of breath. But even without symptoms, high blood pressure thats not treated raises your risk for heart attack and stroke. High blood pressure is a serious health risk and shouldnt be ignored.  A blood pressure reading is made up of two numbers: a higher number over a lower number. The top number is the systolic pressure. The bottom number is the diastolic pressure. A normal blood pressure is a systolic pressure of  less than 120 over a diastolic pressure of less than 80. You will see your blood pressure readings written together. For example, a person with a systolic pressure of 188 and a diastolic pressure of 78 will have 118/78 written in the medical record.  High blood pressure is when either the top number is 140 or higher, or the bottom number is 90 or higher. This must be the result when taking your blood pressure a number of times. The blood pressures between normal and high are called prehypertension.  Home care  If you have high blood pressure, you should do what is listed below to lower your blood pressure. If you are taking medicines for high blood pressure, these methods may reduce or end your need for medicines in the future.  · Begin a weight-loss program if you are overweight.  · Cut back on how much salt you get in your diet. Heres how to do this:  ¨ Dont eat foods that have a lot  of salt. These include olives, pickles, smoked meats, and salted potato chips.  ¨ Dont add salt to your food at the table.  ¨ Use only small amounts of salt when cooking.  · Start an exercise program. Talk with your healthcare provider about the type of exercise program that would be best for you. It doesn't have to be hard. Even brisk walking for 20 minutes 3 times a week is a good form of exercise.  · Dont take medicines that stimulate the heart. This includes many over-the-counter cold and sinus decongestant pills and sprays, as well as diet pills. Check the warnings about hypertension on the label. Before buying any over-the-counter medicines or supplements, always ask the pharmacist about the product's potential interaction with your high blood pressure and your high blood pressure medicines.  · Stimulants such as amphetamine or cocaine could be deadly for someone with high blood pressure. Never take these.  · Limit how much caffeine you get in your diet. Switch to caffeine-free products.  · Stop smoking. If you are a long-time smoker, this can be hard. Talk to your healthcare provider about medicines and nicotine replacement options to help you. Also, enroll in a stop-smoking program to make it more likely that you will quit for good.  · Learn how to handle stress. This is an important part of any program to lower blood pressure. Learn about relaxation methods like meditation, yoga, or biofeedback.  · If your provider prescribed medicines, take them exactly as directed. Missing doses may cause your blood pressure get out of control.  · If you miss a dose or doses, check with your healthcare provider or pharmacist about what to do.  · Consider buying an automatic blood pressure machine. Ask your provider for a recommendation. You can get one of these at most pharmacies.     The American Heart Association recommends the following guidelines for home blood pressure monitoring:  · Don't smoke or drink coffee for  30 minutes before taking your blood pressure.  · Go to the bathroom before the test.  · Relax for 5 minutes before taking the measurement.  · Sit with your back supported (don't sit on a couch or soft chair); keep your feet on the floor uncrossed. Place your arm on a solid flat surface (like a table) with the upper part of the arm at heart level. Place the middle of the cuff directly above the eye of the elbow. Check the monitor's instruction manual for an illustration.  · Take multiple readings. When you measure, take 2 to 3 readings one minute apart and record all of the results.  · Take your blood pressure at the same time every day, or as your healthcare provider recommends.  · Record the date, time, and blood pressure reading.  · Take the record with you to your next medical appointment. If your blood pressure monitor has a built-in memory, simply take the monitor with you to your next appointment.  · Call your provider if you have several high readings. Don't be frightened by a single high blood pressure reading, but if you get several high readings, check in with your healthcare provider.  · Note: When blood pressure reaches a systolic (top number) of 180 or higher OR diastolic (bottom number) of 110 or higher, seek emergency medical treatment.  Follow-up care  You will need to see your healthcare provider regularly. This is to check your blood pressure and to make changes to your medicines. Make a follow-up appointment as directed. Bring the record of your home blood pressure readings to the appointment.  When to seek medical advice  Call your healthcare provider right away if any of these occur:  · Blood pressure reaches a systolic (upper number) of 180 or higher OR a diastolic (bottom number) of 110 or higher  · Chest pain or shortness of breath  · Severe headache  · Throbbing or rushing sound in the ears  · Nosebleed  · Sudden severe pain in your belly (abdomen)  · Extreme drowsiness, confusion, or  fainting  · Dizziness or spinning sensation (vertigo)  · Weakness of an arm or leg or one side of the face  · You have problems speaking or seeing   Date Last Reviewed: 12/1/2016 © 2000-2017 [employer]. [address] [place] [address], [place] [address]. All rights reserved. This information is not intended as a substitute for professional medical care. Always follow your healthcare professional's instructions.        Eating Heart-Healthy Food: Using the DASH Plan    Eating for your heart doesnt have to be hard or boring. You just need to know how to make healthier choices. The DASH eating plan has been developed to help you do just that. DASH stands for Dietary Approaches to Stop Hypertension. It is a plan that has been proven to be healthier for your heart and to lower your risk for high blood pressure. It can also help lower your risk for cancer, heart disease, osteoporosis, and diabetes.  Choosing from each food group  Choose foods from each of the food groups below each day. Try to get the recommended number of servings for each food group. The serving numbers are based on a diet of 2,000 calories a day. Talk to your doctor if youre unsure about your calorie needs. Along with getting the correct servings, the DASH plan also recommends a sodium intake less than 2,300 mg per day.        Grains  Servings: 6 to 8 a day  A serving is:  · 1 slice bread  · 1 ounce dry cereal  · Half a cup cooked rice, pasta or cereal  Best choices: Whole grains and any grains high in fiber. Vegetables  Servings: 4 to 5 a day  A serving is:  · 1 cup raw leafy vegetable  · Half a cup cut-up raw or cooked vegetable  · Half a cup vegetable juice  Best choices: Fresh or frozen vegetables prepared without added salt or fat.   Fruits  Servings: 4 to 5 a day  A serving is:  · 1 medium fruit  · One-quarter cup dried fruit  · Half a cup fresh, frozen, or canned fruit  · Half a cup of 100% fruit juices  Best choices: A variety of fresh  fruits of different colors. Whole fruits are a better choice than fruit juices. Low-fat or fat-free dairy  Servings: 2 to 3 a day  A serving is:  · 1 cup milk  · 1 cup yogurt  · One and a half ounces cheese  Best choices: Skim or 1% milk, low-fat or fat-free yogurt or buttermilk, and low-fat cheeses.         Lean meats, poultry, fish  Servings: 6 or fewer a day  A serving is:  · 1 ounce cooked meats, poultry, or fish  · 1 egg  Best choices: Lean poultry and fish. Trim away visible fat. Broil, grill, roast, or boil instead of frying. Remove skin from poultry before eating. Limit how much red meat you eat.  Nuts, seeds, beans  Servings: 4 to 5 a week  A serving is:  · One-third cup nuts (one and a half ounces)  · 2 tablespoons nut butter or seeds  · Half a cup cooked dry beans or legumes  Best choices: Dry roasted nuts with no salt added, lentils, kidney beans, garbanzo beans, and whole bueno beans.   Fats and oils  Servings: 2 to 3 a day  A serving is:  · 1 teaspoon vegetable oil  · 1 teaspoon soft margarine  · 1 tablespoon mayonnaise  · 2 tablespoons salad dressing  Best choices: Nut and vegetable oils (nontropical vegetable oils), such as olive and canola oil. Sweets  Servings: 5 a week or fewer  A serving is:  · 1 tablespoon sugar, maple syrup, or honey  · 1 tablespoon jam or jelly  · 1 half-ounce jelly beans (about 15)  · 1 cup lemonade  Best choices: Dried fruit can be a satisfying sweet. Choose low-fat sweets. And watch your serving sizes!      For more on the DASH eating plan, visit:  www.nhlbi.nih.gov/health/health-topics/topics/dash   Date Last Reviewed: 6/1/2016  © 0886-1570 [employer]. [address] [place] [address], [place] [address]. All rights reserved. This information is not intended as a substitute for professional medical care. Always follow your healthcare professional's instructions.

## 2020-07-29 NOTE — PROGRESS NOTES
Outpatient Care Management  Initial Patient Assessment    Patient: Sveta العراقي  MRN: [id number]  Date of Service: 07/29/2020  Completed by: Adeline Spaulding RN  Referral Date: 07/29/2020  Program: Case Management (High Risk)    Reason for Visit   Patient presents with    Initial Assessment     7/29/20    OPCM Enrollment Call     7/29/20    PHQ-9     7/29/20    Plan Of Care     7/29/20       Brief Summary: Received opcm referral on 7/29/20 from patient pcp Dr. Colin related to Facet lumbar arthritis  Patient has Hx HTN ,asthma,HLD,CKDIII. Risk score 70 %. Spoke with patient in detail about opcm,patient agreed to enroll with the program today;She is aware she may opt out at any time and there is no cost for this program. Medication reconciliation completed; Will send in basket message to pcp, pt was seen in the ER on 7/27/20 for abdominal pain and constipation and her lasix was held. CM will reach out to Dr. Colin to see if lasix still needs to be on hold. Patient reports she is feeling better from ER visit has been using a daily laxative CM will send [employer] material on constipation. Patient reports she was seen by  today he mentioned digital medicine for b/p monitoring,pt reports she does not have a smart phone. Pt does have a blood pressure monitor at home and has agreed to start monitoring b/p at least once daily. Pt reports she has issues with chronic neck and back pain and takes ultram, pt reports not interested in pain management at this time, CM will revisit at a later date. Pt reports she lives in a single family home with her son his spouse and two grandchildren, she has another son and daughter that live out of town. Pt reports she is able to take care of all her ADL's without assistance and still drives herself around. Pt reports no food,transportation, housing insecurities at this time. Pt has declined to have POA/living will in place at this time. CM will continue to f/u.    CM  will send inToopheret message to pcp in regards to restarting lasix    Assessment Documentation     OPCM Initial Assessment    Involvement of Care  Do I have permission to speak with other family members about your care?: Yes  Assessment completed by: Patient  Patient Reported Insurance  Verified current insurance plan: Medicare, Other (see comment) (Comment: [employer])  Current Health Status  Patient Health Rating  Compared to other people your age, how would you rate your health?: Good  Patient Reported Labs & Vitals  Any patient reported labs and/or vitals?: Yes  Social Determinants  Advanced Care Planning  Do you have any of the following?: None  Support  Present activity level: not limited in any of these ways  Who takes you to your medical appointments?: patient  Housing  Living arrangements: family members  Type of residence: single family home  Own or rent?: rent  Access to [employer]  Does the patient have access to any of the following devices or technologies?: none  Clinical Assessment  Medication Adherence  How does the patient obtain their medications?: patient drives  How many days a week do you miss medications?: never  Do you use a pill box or medication chart to help you manage your medications?: pill box  Do you sometimes have difficulty refilling your medications?: no  Medication reconciliation completed?: Yes  Is medication adherence an area of need?: No  *Active medication list was reviewed and reconciled with patient and/or caregiver:   Nutritional Status  Diet: low sodium  Change in appetite?: no  Recent changes in weight?: no  Dentition: N/A  Is nutrition an area of need?: no  Labs  Do you have regular lab work to monitor your medications?: yes  Type of lab work: n/a  Where do you get your lab work done?: [facility]  PHQ Depression Screen  Cognitive/Behavioral Health  Culture/Islam  Does patient have cultural or Jewish beliefs that may impact ability to access healthcare?:  no  Communication  Language preference: English  Vision assistance: glasses (Comment: reading glasses)  Health Literacy  Preferred learning method: face to face  How often do you need to have someone help you read instructions, pamphlets, or other written material from your doctor or pharmacy?: never  Is there a Health Literacy need?: no  Activities of Daily Living  Ambulation: independent  Dressing: independent  Bathing: independent  Transfers: independent  Toileting: independent  Feeding: independent  Cleaning home/chores: independent  Telephone use: independent  Shopping/attending doctors' appointments: independent  Paying bills: independent  Taking meds: independent  Climbing stairs: independent  Fall Risk  Patient mobility status: Ambulatory  Number of falls in the past 12 months: 0  Fall risk?: No  Equipment/Current Services  Equipment/supplies used in home: none  Current services: n/a  Community & Government Programs  Support: none  Government suppot assistance:  Aid and Attendance Pension (Comment:  /[employer])  Community Resource Assessment  Completion of Initial Assessment         Problem List and History     Patient Active Problem List   Diagnosis    Transient cerebral ischemia    Stenosis of carotid artery    Essential hypertension, benign    Hyperlipidemia    Paroxysmal atrial fibrillation    Gastroesophageal reflux disease without esophagitis    DDD (degenerative disc disease), lumbar    DDD (degenerative disc disease), cervical    Asthma    Primary insomnia    Osteopenia    Lumbar degenerative disc disease    Lumbar radiculopathy    Arthralgia of right temporomandibular joint    Lumbar spondylosis    Facet arthritis of lumbar region    Abnormal findings on radiological examination of gastrointestinal tract    Abdominal pain    Chronic kidney disease, stage III (moderate)       Reviewed Active Problem List with patient and/or Caregiver. The following were  identified as areas of need: HTN    Medical History:  Reviewed medical history with patient and/or caregiver    Social History:  Reviewed social history with patient and/or caregiver    Complex Care Plan    Care plan was discussed and completed today with input from patient and/or caregiver.        Patient Instructions        Clinical Reference Documents Added to Patient Instructions       Document    CONSTIPATION (ADULT) (ENGLISH)    CONSTIPATION, TREATING (ENGLISH)    HEART-HEALTHY FOOD, EATING: USING THE DASH PLAN (ENGLISH)    HYPERTENSION, ESTABLISHED (ENGLISH)            Instructions were provided via the [employer] patient resources and are available for the patient to view on the patient portal, if active.    Next steps: f/u receipt opcm welcome packet  F/u b/p monitoring  F/u medication lasix with pcp    Follow up in about 2 weeks (around 8/12/2020) for RN outreach.    Todays OPCM Self-Management Care Plan was developed with the patients/caregivers input and was based on identified barriers from todays assessment.  Goals were written today with the patient/caregiver and the patient has agreed to work towards these goals to improve his/her overall well-being. Patient verbalized understanding of the care plan, goals, and all of today's instructions. Encouraged patient/caregiver to communicate with his/her physician and health care team about health conditions and the treatment plan.  Provided my contact information today and encouraged patient/caregiver to call me with any questions as needed.

## 2020-07-29 NOTE — LETTER
July 31, 2020             Dear Ms العراقي,    Welcome to [facility] Complex [facility] Management [facility].  It was a pleasure talking with you today.  My name is Adeline Spaulding, and I look forward to being your Care Manager.  My goal is to help you function at the healthiest and highest level possible.  You can contact me directly at [phone]  As an [facility] patient, some of the services we may be able to provide include:      Development of an individualized care plan with a Registered Nurse    Connection with a    Connection with available resources and services     Coordinate communication among your care team members    Provide coaching and education    Help you understand your doctors treatment plan   Help you obtain information about your insurance coverage.     All services provided by [facility] Complex Care Managers and other care team members are coordinated with and communicated to your primary care team.      As part of your enrollment, you will be receiving education materials and more information about these services in your [facility] account, by phone or through the mail.  If you do not wish to participate or receive information, please contact our office at [phone].      Sincerely,        Adeline Spaulding RN  [facility]   Out-patient RN Complex Care Manager

## 2020-07-31 ENCOUNTER — EXTERNAL CHRONIC CARE MANAGEMENT (OUTPATIENT)
Dept: PRIMARY CARE CLINIC | Facility: CLINIC | Age: 70
End: 2020-07-31
Payer: MEDICARE

## 2020-07-31 PROCEDURE — 99490 CHRNC CARE MGMT STAFF 1ST 20: CPT | Mod: S$PBB,,, | Performed by: FAMILY MEDICINE

## 2020-07-31 PROCEDURE — 99490 CHRNC CARE MGMT STAFF 1ST 20: CPT | Mod: PBBFAC,PN | Performed by: FAMILY MEDICINE

## 2020-07-31 PROCEDURE — 99490 PR CHRONIC CARE MGMT, 1ST 20 MIN: ICD-10-PCS | Mod: S$PBB,,, | Performed by: FAMILY MEDICINE

## 2020-08-13 ENCOUNTER — OUTPATIENT CASE MANAGEMENT (OUTPATIENT)
Dept: ADMINISTRATIVE | Facility: OTHER | Age: 70
End: 2020-08-13

## 2020-08-13 NOTE — PROGRESS NOTES
Outpatient Care Management  Plan of Care Follow Up Visit    Patient: Sveta العراقي  MRN: [id number]  Date of Service: 08/13/2020  Completed by: Adeline Spaulding RN  Referral Date: 07/29/2020  Program: Case Management (High Risk)    Reason for Visit   Patient presents with    Update Plan Of Care     8/13/20       Brief Summary: Opcm follow up call. Patient reports she has been doing well with her diet not adding additional salt to her meals.  Patient reports she has not started monitoring her blood pressure as discussed but will start this week. Patient also reports her back pain has been tolerable she been able to get out and work in the yard and has not had to take much pain medication. CM discussed the importance of medication and dietary compliance and in reviewed the importance of daily blood pressure monitoring. Cm will continue to follow up.    CM will send message to [facility] Opcm following.    Patient Summary     Involvement of Care:  Do I have permission to speak with other family members about your care?   Yes Champ العراقي/Son    Patient Reported Labs & Vitals:  1.  Any Patient Reported Labs & Vitals?     2.  Patient Reported Blood Pressure:     3.  Patient Reported Pulse:     4.  Patient Reported Weight (Kg):     5.  Patient Reported Blood Glucose (mg/dl):       Medical and social history was reviewed with patient and/or caregiver.     Clinical Assessment     Reviewed and provided basic information on available community resources for mental health, transportation, wellness resources, and palliative care programs with patient and/or caregiver.     Complex Care Plan     Care plan was discussed and completed today with input from patient and/or caregiver.    Patient Instructions     Instructions were provided via the [employer] patient resources and are available for the patient to view on the patient portal.    Next Steps: F/u blood pressure monitoring and dietary compliance    Follow up in about 2  weeks (around 8/27/2020) for RN outreach.    Todays OPCM Self-Management Care Plan was developed with the patients/caregivers input and was based on identified barriers from todays assessment.  Goals were written today with the patient/caregiver and the patient has agreed to work towards these goals to improve his/her overall well-being. Patient verbalized understanding of the care plan, goals, and all of today's instructions. Encouraged patient/caregiver to communicate with his/her physician and health care team about health conditions and the treatment plan.  Provided my contact information today and encouraged patient/caregiver to call me with any questions as needed.

## 2020-08-31 ENCOUNTER — OUTPATIENT CASE MANAGEMENT (OUTPATIENT)
Dept: ADMINISTRATIVE | Facility: OTHER | Age: 70
End: 2020-08-31

## 2020-09-01 ENCOUNTER — OUTPATIENT CASE MANAGEMENT (OUTPATIENT)
Dept: ADMINISTRATIVE | Facility: OTHER | Age: 70
End: 2020-09-01

## 2020-09-04 ENCOUNTER — OFFICE VISIT (OUTPATIENT)
Dept: PRIMARY CARE CLINIC | Facility: CLINIC | Age: 70
End: 2020-09-04
Payer: MEDICARE

## 2020-09-04 VITALS
SYSTOLIC BLOOD PRESSURE: 136 MMHG | TEMPERATURE: 99 F | HEIGHT: 71 IN | BODY MASS INDEX: 37.06 KG/M2 | OXYGEN SATURATION: 95 % | HEART RATE: 76 BPM | WEIGHT: 264.69 LBS | RESPIRATION RATE: 17 BRPM | DIASTOLIC BLOOD PRESSURE: 72 MMHG

## 2020-09-04 DIAGNOSIS — Z86.73 HISTORY OF TRANSIENT ISCHEMIC ATTACK (TIA): ICD-10-CM

## 2020-09-04 DIAGNOSIS — R42 DISEQUILIBRIUM: ICD-10-CM

## 2020-09-04 DIAGNOSIS — M85.80 OSTEOPENIA, UNSPECIFIED LOCATION: ICD-10-CM

## 2020-09-04 DIAGNOSIS — I65.29 STENOSIS OF CAROTID ARTERY, UNSPECIFIED LATERALITY: ICD-10-CM

## 2020-09-04 DIAGNOSIS — R42 DIZZINESS: ICD-10-CM

## 2020-09-04 DIAGNOSIS — R55 SYNCOPE AND COLLAPSE: ICD-10-CM

## 2020-09-04 DIAGNOSIS — I48.0 PAROXYSMAL ATRIAL FIBRILLATION: ICD-10-CM

## 2020-09-04 DIAGNOSIS — N18.30 CHRONIC RENAL IMPAIRMENT, STAGE 3 (MODERATE): ICD-10-CM

## 2020-09-04 DIAGNOSIS — E78.5 HYPERLIPIDEMIA, UNSPECIFIED HYPERLIPIDEMIA TYPE: ICD-10-CM

## 2020-09-04 DIAGNOSIS — I10 ESSENTIAL HYPERTENSION, BENIGN: ICD-10-CM

## 2020-09-04 DIAGNOSIS — K21.9 GASTROESOPHAGEAL REFLUX DISEASE WITHOUT ESOPHAGITIS: ICD-10-CM

## 2020-09-04 DIAGNOSIS — M47.816 FACET ARTHRITIS OF LUMBAR REGION: ICD-10-CM

## 2020-09-04 LAB
BILIRUB SERPL-MCNC: ABNORMAL MG/DL
BLOOD URINE, POC: ABNORMAL
CLARITY, POC UA: CLEAR
COLOR, POC UA: YELLOW
GLUCOSE UR QL STRIP: ABNORMAL
KETONES UR QL STRIP: ABNORMAL
LEUKOCYTE ESTERASE URINE, POC: ABNORMAL
NITRITE, POC UA: ABNORMAL
PH, POC UA: 5
PROTEIN, POC: ABNORMAL
SPECIFIC GRAVITY, POC UA: 1.02
UROBILINOGEN, POC UA: ABNORMAL

## 2020-09-04 PROCEDURE — 99214 OFFICE O/P EST MOD 30 MIN: CPT | Mod: S$PBB,,, | Performed by: FAMILY MEDICINE

## 2020-09-04 PROCEDURE — 99214 PR OFFICE/OUTPT VISIT, EST, LEVL IV, 30-39 MIN: ICD-10-PCS | Mod: S$PBB,,, | Performed by: FAMILY MEDICINE

## 2020-09-04 PROCEDURE — 81002 URINALYSIS NONAUTO W/O SCOPE: CPT | Mod: PBBFAC,PN | Performed by: FAMILY MEDICINE

## 2020-09-04 PROCEDURE — 99999 PR PBB SHADOW E&M-EST. PATIENT-LVL III: ICD-10-PCS | Mod: PBBFAC,,, | Performed by: FAMILY MEDICINE

## 2020-09-04 PROCEDURE — 99213 OFFICE O/P EST LOW 20 MIN: CPT | Mod: PBBFAC,PN | Performed by: FAMILY MEDICINE

## 2020-09-04 PROCEDURE — 99999 PR PBB SHADOW E&M-EST. PATIENT-LVL III: CPT | Mod: PBBFAC,,, | Performed by: FAMILY MEDICINE

## 2020-09-04 RX ORDER — MECLIZINE HYDROCHLORIDE 25 MG/1
TABLET ORAL
Qty: 30 TABLET | Refills: 2 | Status: SHIPPED | OUTPATIENT
Start: 2020-09-04 | End: 2021-09-10
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
## 2020-09-08 ENCOUNTER — OFFICE VISIT (OUTPATIENT)
Dept: PRIMARY CARE CLINIC | Facility: CLINIC | Age: 70
End: 2020-09-08
Payer: MEDICARE

## 2020-09-08 VITALS
SYSTOLIC BLOOD PRESSURE: 110 MMHG | WEIGHT: 268.06 LBS | HEART RATE: 62 BPM | BODY MASS INDEX: 37.53 KG/M2 | TEMPERATURE: 98 F | DIASTOLIC BLOOD PRESSURE: 58 MMHG | RESPIRATION RATE: 18 BRPM | HEIGHT: 71 IN | OXYGEN SATURATION: 98 %

## 2020-09-08 DIAGNOSIS — M50.30 DDD (DEGENERATIVE DISC DISEASE), CERVICAL: Primary | ICD-10-CM

## 2020-09-08 DIAGNOSIS — Z86.73 HISTORY OF TRANSIENT ISCHEMIC ATTACK (TIA): ICD-10-CM

## 2020-09-08 DIAGNOSIS — M47.816 FACET ARTHRITIS OF LUMBAR REGION: ICD-10-CM

## 2020-09-08 DIAGNOSIS — N18.30 CHRONIC RENAL IMPAIRMENT, STAGE 3 (MODERATE): ICD-10-CM

## 2020-09-08 DIAGNOSIS — K21.9 GASTROESOPHAGEAL REFLUX DISEASE WITHOUT ESOPHAGITIS: ICD-10-CM

## 2020-09-08 DIAGNOSIS — M54.16 LUMBAR RADICULOPATHY: ICD-10-CM

## 2020-09-08 DIAGNOSIS — J45.909 ASTHMA, UNSPECIFIED ASTHMA SEVERITY, UNSPECIFIED WHETHER COMPLICATED, UNSPECIFIED WHETHER PERSISTENT: ICD-10-CM

## 2020-09-08 DIAGNOSIS — I10 ESSENTIAL HYPERTENSION, BENIGN: ICD-10-CM

## 2020-09-08 DIAGNOSIS — M51.36 DDD (DEGENERATIVE DISC DISEASE), LUMBAR: ICD-10-CM

## 2020-09-08 DIAGNOSIS — E78.5 HYPERLIPIDEMIA, UNSPECIFIED HYPERLIPIDEMIA TYPE: ICD-10-CM

## 2020-09-08 DIAGNOSIS — I48.0 PAROXYSMAL ATRIAL FIBRILLATION: ICD-10-CM

## 2020-09-08 DIAGNOSIS — G45.9 TRANSIENT CEREBRAL ISCHEMIA, UNSPECIFIED TYPE: ICD-10-CM

## 2020-09-08 DIAGNOSIS — M85.80 OSTEOPENIA, UNSPECIFIED LOCATION: ICD-10-CM

## 2020-09-08 PROCEDURE — 99214 OFFICE O/P EST MOD 30 MIN: CPT | Mod: PBBFAC,PN | Performed by: FAMILY MEDICINE

## 2020-09-08 PROCEDURE — 99214 OFFICE O/P EST MOD 30 MIN: CPT | Mod: S$PBB,,, | Performed by: FAMILY MEDICINE

## 2020-09-08 PROCEDURE — 99999 PR PBB SHADOW E&M-EST. PATIENT-LVL IV: CPT | Mod: PBBFAC,,, | Performed by: FAMILY MEDICINE

## 2020-09-08 PROCEDURE — 99214 PR OFFICE/OUTPT VISIT, EST, LEVL IV, 30-39 MIN: ICD-10-PCS | Mod: S$PBB,,, | Performed by: FAMILY MEDICINE

## 2020-09-08 PROCEDURE — 99999 PR PBB SHADOW E&M-EST. PATIENT-LVL IV: ICD-10-PCS | Mod: PBBFAC,,, | Performed by: FAMILY MEDICINE
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
## 2020-09-11 NOTE — PROGRESS NOTES
Outpatient Care Management  Plan of Care Follow Up Visit    Patient: Sveta العراقي  MRN: [id number]  Date of Service: 09/01/2020  Completed by: Adeline Spaulding RN  Referral Date: 07/29/2020  Program: Case Management (High Risk)    Reason for Visit   Patient presents with    OPCM RN First Follow-Up Attempt     9/4/20    OPCM RN Second Follow-Up Attempt     9/9/20       Brief Summary: OPCM follow up call. Patient recently seen by Dr. Colin for dizziness on and off patient was started on antivert 25 mg reports it is helping. Dr. Lowe will have her to follow up with neurology for evaluation.  Patient has been monitoring b/p as directed.patient reports she is compliant with her medications aand will continue to monitor her b/p. CM advised if dizziness worsens to call Dr. Colin or report to ED for evaluation.  Patient agreed with plan. CM will continue to follow up.    Patient Summary     Involvement of Care:  Do I have permission to speak with other family members about your care?   Son Champ العراقي    Patient Reported Labs & Vitals:  1.  Any Patient Reported Labs & Vitals?     2.  Patient Reported Blood Pressure:     3.  Patient Reported Pulse:     4.  Patient Reported Weight (Kg):     5.  Patient Reported Blood Glucose (mg/dl):       Medical and social history was reviewed with patient and/or caregiver.     Clinical Assessment     Reviewed and provided basic information on available community resources for mental health, transportation, wellness resources, and palliative care programs with patient and/or caregiver.     Complex Care Plan     Care plan was discussed and completed today with input from patient and/or caregiver.    Patient Instructions     Instructions were provided via the [employer] patient resources and are available for the patient to view on the patient portal.    Next Steps: F/u b/p monitoring  F/u neuro consult for dizziness    Patient agrees to follow up on 9/18/20    Todays OPCM  Self-Management Care Plan was developed with the patients/caregivers input and was based on identified barriers from todays assessment.  Goals were written today with the patient/caregiver and the patient has agreed to work towards these goals to improve his/her overall well-being. Patient verbalized understanding of the care plan, goals, and all of today's instructions. Encouraged patient/caregiver to communicate with his/her physician and health care team about health conditions and the treatment plan.  Provided my contact information today and encouraged patient/caregiver to call me with any questions as needed.

## 2020-09-17 ENCOUNTER — TELEPHONE (OUTPATIENT)
Dept: PRIMARY CARE CLINIC | Facility: CLINIC | Age: 70
End: 2020-09-17

## 2020-09-18 ENCOUNTER — OUTPATIENT CASE MANAGEMENT (OUTPATIENT)
Dept: ADMINISTRATIVE | Facility: OTHER | Age: 70
End: 2020-09-18

## 2020-10-31 ENCOUNTER — EXTERNAL CHRONIC CARE MANAGEMENT (OUTPATIENT)
Dept: PRIMARY CARE CLINIC | Facility: CLINIC | Age: 70
End: 2020-10-31
Payer: MEDICARE

## 2020-10-31 PROCEDURE — 99490 CHRNC CARE MGMT STAFF 1ST 20: CPT | Mod: PBBFAC,PN | Performed by: FAMILY MEDICINE

## 2020-10-31 PROCEDURE — 99490 CHRNC CARE MGMT STAFF 1ST 20: CPT | Mod: S$PBB,,, | Performed by: FAMILY MEDICINE

## 2020-10-31 PROCEDURE — 99490 PR CHRONIC CARE MGMT, 1ST 20 MIN: ICD-10-PCS | Mod: S$PBB,,, | Performed by: FAMILY MEDICINE

## 2020-11-30 ENCOUNTER — EXTERNAL CHRONIC CARE MANAGEMENT (OUTPATIENT)
Dept: PRIMARY CARE CLINIC | Facility: CLINIC | Age: 70
End: 2020-11-30
Payer: MEDICARE

## 2020-11-30 PROCEDURE — 99490 PR CHRONIC CARE MGMT, 1ST 20 MIN: ICD-10-PCS | Mod: S$PBB,,, | Performed by: FAMILY MEDICINE

## 2020-11-30 PROCEDURE — 99490 CHRNC CARE MGMT STAFF 1ST 20: CPT | Mod: PBBFAC,PN | Performed by: FAMILY MEDICINE

## 2020-11-30 PROCEDURE — 99490 CHRNC CARE MGMT STAFF 1ST 20: CPT | Mod: S$PBB,,, | Performed by: FAMILY MEDICINE

## 2020-12-31 ENCOUNTER — EXTERNAL CHRONIC CARE MANAGEMENT (OUTPATIENT)
Dept: PRIMARY CARE CLINIC | Facility: CLINIC | Age: 70
End: 2020-12-31
Payer: MEDICARE

## 2020-12-31 PROCEDURE — 99490 CHRNC CARE MGMT STAFF 1ST 20: CPT | Mod: PBBFAC,PN | Performed by: FAMILY MEDICINE

## 2020-12-31 PROCEDURE — 99490 PR CHRONIC CARE MGMT, 1ST 20 MIN: ICD-10-PCS | Mod: S$PBB,,, | Performed by: FAMILY MEDICINE

## 2020-12-31 PROCEDURE — 99490 CHRNC CARE MGMT STAFF 1ST 20: CPT | Mod: S$PBB,,, | Performed by: FAMILY MEDICINE

## 2021-01-31 ENCOUNTER — EXTERNAL CHRONIC CARE MANAGEMENT (OUTPATIENT)
Dept: PRIMARY CARE CLINIC | Facility: CLINIC | Age: 71
End: 2021-01-31
Payer: MEDICARE

## 2021-01-31 PROCEDURE — 99490 CHRNC CARE MGMT STAFF 1ST 20: CPT | Mod: PBBFAC,PN | Performed by: FAMILY MEDICINE

## 2021-01-31 PROCEDURE — 99490 PR CHRONIC CARE MGMT, 1ST 20 MIN: ICD-10-PCS | Mod: S$PBB,,, | Performed by: FAMILY MEDICINE

## 2021-01-31 PROCEDURE — 99490 CHRNC CARE MGMT STAFF 1ST 20: CPT | Mod: S$PBB,,, | Performed by: FAMILY MEDICINE

## 2021-02-08 ENCOUNTER — PATIENT MESSAGE (OUTPATIENT)
Dept: PRIMARY CARE CLINIC | Facility: CLINIC | Age: 71
End: 2021-02-08

## 2021-02-28 ENCOUNTER — EXTERNAL CHRONIC CARE MANAGEMENT (OUTPATIENT)
Dept: PRIMARY CARE CLINIC | Facility: CLINIC | Age: 71
End: 2021-02-28
Payer: MEDICARE

## 2021-02-28 PROCEDURE — 99490 PR CHRONIC CARE MGMT, 1ST 20 MIN: ICD-10-PCS | Mod: S$PBB,,, | Performed by: FAMILY MEDICINE

## 2021-02-28 PROCEDURE — 99490 CHRNC CARE MGMT STAFF 1ST 20: CPT | Mod: S$PBB,,, | Performed by: FAMILY MEDICINE

## 2021-02-28 PROCEDURE — 99490 CHRNC CARE MGMT STAFF 1ST 20: CPT | Mod: PBBFAC,PN | Performed by: FAMILY MEDICINE

## 2021-03-19 ENCOUNTER — OFFICE VISIT (OUTPATIENT)
Dept: PRIMARY CARE CLINIC | Facility: CLINIC | Age: 71
End: 2021-03-19
Payer: MEDICARE

## 2021-03-19 VITALS
OXYGEN SATURATION: 98 % | WEIGHT: 264.25 LBS | RESPIRATION RATE: 16 BRPM | HEIGHT: 71 IN | SYSTOLIC BLOOD PRESSURE: 124 MMHG | TEMPERATURE: 98 F | DIASTOLIC BLOOD PRESSURE: 70 MMHG | BODY MASS INDEX: 36.99 KG/M2 | HEART RATE: 64 BPM

## 2021-03-19 DIAGNOSIS — S99.922A INJURY OF TOE ON LEFT FOOT, INITIAL ENCOUNTER: ICD-10-CM

## 2021-03-19 DIAGNOSIS — R10.11 RIGHT UPPER QUADRANT PAIN: Primary | ICD-10-CM

## 2021-03-19 PROCEDURE — 99214 OFFICE O/P EST MOD 30 MIN: CPT | Mod: S$PBB,,, | Performed by: STUDENT IN AN ORGANIZED HEALTH CARE EDUCATION/TRAINING PROGRAM

## 2021-03-19 PROCEDURE — 99214 PR OFFICE/OUTPT VISIT, EST, LEVL IV, 30-39 MIN: ICD-10-PCS | Mod: S$PBB,,, | Performed by: STUDENT IN AN ORGANIZED HEALTH CARE EDUCATION/TRAINING PROGRAM

## 2021-03-19 PROCEDURE — 99999 PR PBB SHADOW E&M-EST. PATIENT-LVL V: CPT | Mod: PBBFAC,,, | Performed by: STUDENT IN AN ORGANIZED HEALTH CARE EDUCATION/TRAINING PROGRAM

## 2021-03-19 PROCEDURE — 99215 OFFICE O/P EST HI 40 MIN: CPT | Mod: PBBFAC,PN | Performed by: STUDENT IN AN ORGANIZED HEALTH CARE EDUCATION/TRAINING PROGRAM

## 2021-03-19 PROCEDURE — 99999 PR PBB SHADOW E&M-EST. PATIENT-LVL V: ICD-10-PCS | Mod: PBBFAC,,, | Performed by: STUDENT IN AN ORGANIZED HEALTH CARE EDUCATION/TRAINING PROGRAM

## 2021-03-22 ENCOUNTER — PES CALL (OUTPATIENT)
Dept: ADMINISTRATIVE | Facility: CLINIC | Age: 71
End: 2021-03-22

## 2021-03-31 ENCOUNTER — EXTERNAL CHRONIC CARE MANAGEMENT (OUTPATIENT)
Dept: PRIMARY CARE CLINIC | Facility: CLINIC | Age: 71
End: 2021-03-31
Payer: MEDICARE

## 2021-03-31 PROCEDURE — 99490 CHRNC CARE MGMT STAFF 1ST 20: CPT | Mod: PBBFAC,PN | Performed by: FAMILY MEDICINE

## 2021-03-31 PROCEDURE — 99439 PR CHRONIC CARE MGMT, EA ADDTL 20 MIN: ICD-10-PCS | Mod: S$PBB,,, | Performed by: FAMILY MEDICINE

## 2021-03-31 PROCEDURE — 99439 CHRNC CARE MGMT STAF EA ADDL: CPT | Mod: PBBFAC,PN | Performed by: FAMILY MEDICINE

## 2021-03-31 PROCEDURE — 99439 CHRNC CARE MGMT STAF EA ADDL: CPT | Mod: S$PBB,,, | Performed by: FAMILY MEDICINE

## 2021-03-31 PROCEDURE — 99490 CHRNC CARE MGMT STAFF 1ST 20: CPT | Mod: S$PBB,,, | Performed by: FAMILY MEDICINE

## 2021-03-31 PROCEDURE — 99490 PR CHRONIC CARE MGMT, 1ST 20 MIN: ICD-10-PCS | Mod: S$PBB,,, | Performed by: FAMILY MEDICINE

## 2021-04-07 ENCOUNTER — OFFICE VISIT (OUTPATIENT)
Dept: PRIMARY CARE CLINIC | Facility: CLINIC | Age: 71
End: 2021-04-07
Payer: MEDICARE

## 2021-04-07 VITALS
HEART RATE: 60 BPM | HEIGHT: 71 IN | DIASTOLIC BLOOD PRESSURE: 70 MMHG | WEIGHT: 232.25 LBS | OXYGEN SATURATION: 98 % | TEMPERATURE: 98 F | RESPIRATION RATE: 16 BRPM | SYSTOLIC BLOOD PRESSURE: 118 MMHG | BODY MASS INDEX: 32.52 KG/M2

## 2021-04-07 DIAGNOSIS — H92.01 ACUTE EAR PAIN, RIGHT: ICD-10-CM

## 2021-04-07 DIAGNOSIS — J01.30 ACUTE NON-RECURRENT SPHENOIDAL SINUSITIS: Primary | ICD-10-CM

## 2021-04-07 DIAGNOSIS — R01.1 SYSTOLIC MURMUR: ICD-10-CM

## 2021-04-07 PROCEDURE — 99214 OFFICE O/P EST MOD 30 MIN: CPT | Mod: S$PBB,,, | Performed by: STUDENT IN AN ORGANIZED HEALTH CARE EDUCATION/TRAINING PROGRAM

## 2021-04-07 PROCEDURE — 99999 PR PBB SHADOW E&M-EST. PATIENT-LVL V: ICD-10-PCS | Mod: PBBFAC,,, | Performed by: STUDENT IN AN ORGANIZED HEALTH CARE EDUCATION/TRAINING PROGRAM

## 2021-04-07 PROCEDURE — 99214 PR OFFICE/OUTPT VISIT, EST, LEVL IV, 30-39 MIN: ICD-10-PCS | Mod: S$PBB,,, | Performed by: STUDENT IN AN ORGANIZED HEALTH CARE EDUCATION/TRAINING PROGRAM

## 2021-04-07 PROCEDURE — 96372 THER/PROPH/DIAG INJ SC/IM: CPT | Mod: PBBFAC,PN

## 2021-04-07 PROCEDURE — 99999 PR PBB SHADOW E&M-EST. PATIENT-LVL V: CPT | Mod: PBBFAC,,, | Performed by: STUDENT IN AN ORGANIZED HEALTH CARE EDUCATION/TRAINING PROGRAM

## 2021-04-07 PROCEDURE — 99215 OFFICE O/P EST HI 40 MIN: CPT | Mod: PBBFAC,PN,25 | Performed by: STUDENT IN AN ORGANIZED HEALTH CARE EDUCATION/TRAINING PROGRAM

## 2021-04-07 RX ORDER — BETAMETHASONE SODIUM PHOSPHATE AND BETAMETHASONE ACETATE 3; 3 MG/ML; MG/ML
6 INJECTION, SUSPENSION INTRA-ARTICULAR; INTRALESIONAL; INTRAMUSCULAR; SOFT TISSUE ONCE
Status: COMPLETED | OUTPATIENT
Start: 2021-04-07 | End: 2021-04-07

## 2021-04-07 RX ADMIN — BETAMETHASONE SODIUM PHOSPHATE AND BETAMETHASONE ACETATE 6 MG: 3; 3 INJECTION, SUSPENSION INTRA-ARTICULAR; INTRALESIONAL; INTRAMUSCULAR at 08:04

## 2021-04-08 RX ORDER — FUROSEMIDE 20 MG/1
TABLET ORAL
Qty: 90 TABLET | Refills: 0 | Status: SHIPPED | OUTPATIENT
Start: 2021-04-08 | End: 2021-06-14

## 2021-04-30 ENCOUNTER — EXTERNAL CHRONIC CARE MANAGEMENT (OUTPATIENT)
Dept: PRIMARY CARE CLINIC | Facility: CLINIC | Age: 71
End: 2021-04-30
Payer: MEDICARE

## 2021-04-30 PROCEDURE — 99490 CHRNC CARE MGMT STAFF 1ST 20: CPT | Mod: S$PBB,,, | Performed by: FAMILY MEDICINE

## 2021-04-30 PROCEDURE — 99490 PR CHRONIC CARE MGMT, 1ST 20 MIN: ICD-10-PCS | Mod: S$PBB,,, | Performed by: FAMILY MEDICINE

## 2021-04-30 PROCEDURE — 99490 CHRNC CARE MGMT STAFF 1ST 20: CPT | Mod: PBBFAC,PN | Performed by: FAMILY MEDICINE

## 2021-05-12 ENCOUNTER — PES CALL (OUTPATIENT)
Dept: ADMINISTRATIVE | Facility: CLINIC | Age: 71
End: 2021-05-12

## 2021-05-31 ENCOUNTER — OFFICE VISIT (OUTPATIENT)
Dept: PRIMARY CARE CLINIC | Facility: CLINIC | Age: 71
End: 2021-05-31
Payer: MEDICARE

## 2021-05-31 VITALS
HEART RATE: 64 BPM | WEIGHT: 268.31 LBS | HEIGHT: 71 IN | SYSTOLIC BLOOD PRESSURE: 128 MMHG | BODY MASS INDEX: 37.56 KG/M2 | OXYGEN SATURATION: 97 % | TEMPERATURE: 98 F | DIASTOLIC BLOOD PRESSURE: 78 MMHG | RESPIRATION RATE: 18 BRPM

## 2021-05-31 DIAGNOSIS — M50.30 DDD (DEGENERATIVE DISC DISEASE), CERVICAL: Primary | ICD-10-CM

## 2021-05-31 DIAGNOSIS — I69.851 HEMIPLEGIA AND HEMIPARESIS FOLLOWING OTHER CEREBROVASCULAR DISEASE AFFECTING RIGHT DOMINANT SIDE: ICD-10-CM

## 2021-05-31 DIAGNOSIS — E66.01 SEVERE OBESITY (BMI 35.0-39.9) WITH COMORBIDITY: ICD-10-CM

## 2021-05-31 DIAGNOSIS — N18.32 CHRONIC RENAL IMPAIRMENT, STAGE 3B: ICD-10-CM

## 2021-05-31 PROBLEM — R10.9 ABDOMINAL PAIN: Status: RESOLVED | Noted: 2019-10-02 | Resolved: 2021-05-31

## 2021-05-31 PROCEDURE — 99999 PR PBB SHADOW E&M-EST. PATIENT-LVL IV: CPT | Mod: PBBFAC,,, | Performed by: FAMILY MEDICINE

## 2021-05-31 PROCEDURE — 99214 PR OFFICE/OUTPT VISIT, EST, LEVL IV, 30-39 MIN: ICD-10-PCS | Mod: S$PBB,,, | Performed by: FAMILY MEDICINE

## 2021-05-31 PROCEDURE — 99999 PR PBB SHADOW E&M-EST. PATIENT-LVL IV: ICD-10-PCS | Mod: PBBFAC,,, | Performed by: FAMILY MEDICINE

## 2021-05-31 PROCEDURE — 99214 OFFICE O/P EST MOD 30 MIN: CPT | Mod: PBBFAC,PN | Performed by: FAMILY MEDICINE

## 2021-05-31 PROCEDURE — 99214 OFFICE O/P EST MOD 30 MIN: CPT | Mod: S$PBB,,, | Performed by: FAMILY MEDICINE

## 2021-05-31 RX ORDER — TIZANIDINE 4 MG/1
4 TABLET ORAL 3 TIMES DAILY PRN
Qty: 30 TABLET | Refills: 0 | Status: SHIPPED | OUTPATIENT
Start: 2021-05-31 | End: 2021-09-10

## 2021-05-31 RX ORDER — METHYLPREDNISOLONE 4 MG/1
TABLET ORAL
Qty: 1 PACKAGE | Refills: 0 | Status: SHIPPED | OUTPATIENT
Start: 2021-05-31 | End: 2021-09-10

## 2021-05-31 RX ORDER — HYDROCODONE BITARTRATE AND ACETAMINOPHEN 5; 325 MG/1; MG/1
1 TABLET ORAL EVERY 6 HOURS PRN
Qty: 20 TABLET | Refills: 0 | Status: SHIPPED | OUTPATIENT
Start: 2021-05-31 | End: 2021-09-10

## 2021-06-14 RX ORDER — FUROSEMIDE 20 MG/1
TABLET ORAL
Qty: 90 TABLET | Refills: 1 | Status: SHIPPED | OUTPATIENT
Start: 2021-06-14 | End: 2021-09-10

## 2021-07-17 ENCOUNTER — PATIENT MESSAGE (OUTPATIENT)
Dept: PRIMARY CARE CLINIC | Facility: CLINIC | Age: 71
End: 2021-07-17

## 2021-07-22 ENCOUNTER — TELEPHONE (OUTPATIENT)
Dept: PRIMARY CARE CLINIC | Facility: CLINIC | Age: 71
End: 2021-07-22

## 2021-07-22 DIAGNOSIS — Z12.31 ENCOUNTER FOR SCREENING MAMMOGRAM FOR BREAST CANCER: Primary | ICD-10-CM

## 2021-08-15 ENCOUNTER — NURSE TRIAGE (OUTPATIENT)
Dept: ADMINISTRATIVE | Facility: CLINIC | Age: 71
End: 2021-08-15

## 2021-08-16 ENCOUNTER — PES CALL (OUTPATIENT)
Dept: ADMINISTRATIVE | Facility: CLINIC | Age: 71
End: 2021-08-16

## 2021-09-10 ENCOUNTER — TELEPHONE (OUTPATIENT)
Dept: PRIMARY CARE CLINIC | Facility: CLINIC | Age: 71
End: 2021-09-10

## 2021-09-10 ENCOUNTER — OFFICE VISIT (OUTPATIENT)
Dept: PRIMARY CARE CLINIC | Facility: CLINIC | Age: 71
End: 2021-09-10
Payer: MEDICARE

## 2021-09-10 VITALS
WEIGHT: 267.19 LBS | OXYGEN SATURATION: 98 % | SYSTOLIC BLOOD PRESSURE: 122 MMHG | HEART RATE: 75 BPM | HEIGHT: 71 IN | RESPIRATION RATE: 18 BRPM | BODY MASS INDEX: 37.41 KG/M2 | DIASTOLIC BLOOD PRESSURE: 78 MMHG

## 2021-09-10 DIAGNOSIS — N18.32 CHRONIC RENAL IMPAIRMENT, STAGE 3B: ICD-10-CM

## 2021-09-10 DIAGNOSIS — G45.9 TRANSIENT CEREBRAL ISCHEMIA, UNSPECIFIED TYPE: Primary | ICD-10-CM

## 2021-09-10 DIAGNOSIS — I10 ESSENTIAL HYPERTENSION, BENIGN: ICD-10-CM

## 2021-09-10 DIAGNOSIS — M51.36 DDD (DEGENERATIVE DISC DISEASE), LUMBAR: ICD-10-CM

## 2021-09-10 DIAGNOSIS — I48.0 PAROXYSMAL ATRIAL FIBRILLATION: ICD-10-CM

## 2021-09-10 PROCEDURE — 99999 PR PBB SHADOW E&M-EST. PATIENT-LVL IV: CPT | Mod: PBBFAC,,, | Performed by: FAMILY MEDICINE

## 2021-09-10 PROCEDURE — 99214 OFFICE O/P EST MOD 30 MIN: CPT | Mod: S$PBB,,, | Performed by: FAMILY MEDICINE

## 2021-09-10 PROCEDURE — 99999 PR PBB SHADOW E&M-EST. PATIENT-LVL IV: ICD-10-PCS | Mod: PBBFAC,,, | Performed by: FAMILY MEDICINE

## 2021-09-10 PROCEDURE — 99214 OFFICE O/P EST MOD 30 MIN: CPT | Mod: PBBFAC,PN | Performed by: FAMILY MEDICINE

## 2021-09-10 PROCEDURE — 99214 PR OFFICE/OUTPT VISIT, EST, LEVL IV, 30-39 MIN: ICD-10-PCS | Mod: S$PBB,,, | Performed by: FAMILY MEDICINE

## 2021-09-10 RX ORDER — RIVAROXABAN 15 MG/1
15 TABLET, FILM COATED ORAL DAILY
COMMUNITY
Start: 2021-08-16 | End: 2022-10-25 | Stop reason: SDUPTHER

## 2021-09-10 RX ORDER — METHOCARBAMOL 500 MG/1
500 TABLET, FILM COATED ORAL 3 TIMES DAILY
COMMUNITY
Start: 2021-09-06 | End: 2022-01-19

## 2021-09-10 RX ORDER — TRAMADOL HYDROCHLORIDE 50 MG/1
50 TABLET ORAL
Qty: 90 TABLET | Refills: 0 | Status: SHIPPED | OUTPATIENT
Start: 2021-09-10 | End: 2021-12-10 | Stop reason: SDUPTHER

## 2021-09-14 ENCOUNTER — TELEPHONE (OUTPATIENT)
Dept: PRIMARY CARE CLINIC | Facility: CLINIC | Age: 71
End: 2021-09-14

## 2021-09-20 ENCOUNTER — PATIENT MESSAGE (OUTPATIENT)
Dept: PRIMARY CARE CLINIC | Facility: CLINIC | Age: 71
End: 2021-09-20

## 2021-09-22 ENCOUNTER — PATIENT MESSAGE (OUTPATIENT)
Dept: PRIMARY CARE CLINIC | Facility: CLINIC | Age: 71
End: 2021-09-22

## 2021-09-27 ENCOUNTER — TELEPHONE (OUTPATIENT)
Dept: PRIMARY CARE CLINIC | Facility: CLINIC | Age: 71
End: 2021-09-27

## 2021-09-30 ENCOUNTER — PES CALL (OUTPATIENT)
Dept: ADMINISTRATIVE | Facility: CLINIC | Age: 71
End: 2021-09-30

## 2021-10-05 ENCOUNTER — PATIENT MESSAGE (OUTPATIENT)
Dept: ADMINISTRATIVE | Facility: HOSPITAL | Age: 71
End: 2021-10-05

## 2021-10-05 ENCOUNTER — TELEPHONE (OUTPATIENT)
Dept: PRIMARY CARE CLINIC | Facility: CLINIC | Age: 71
End: 2021-10-05

## 2021-10-06 ENCOUNTER — TELEPHONE (OUTPATIENT)
Dept: PRIMARY CARE CLINIC | Facility: CLINIC | Age: 71
End: 2021-10-06

## 2021-10-21 ENCOUNTER — TELEPHONE (OUTPATIENT)
Dept: PRIMARY CARE CLINIC | Facility: CLINIC | Age: 71
End: 2021-10-21

## 2021-10-25 ENCOUNTER — TELEPHONE (OUTPATIENT)
Dept: PRIMARY CARE CLINIC | Facility: CLINIC | Age: 71
End: 2021-10-25
Payer: MEDICARE

## 2021-12-10 ENCOUNTER — OFFICE VISIT (OUTPATIENT)
Dept: PRIMARY CARE CLINIC | Facility: CLINIC | Age: 71
End: 2021-12-10
Payer: MEDICARE

## 2021-12-10 VITALS
DIASTOLIC BLOOD PRESSURE: 66 MMHG | RESPIRATION RATE: 18 BRPM | WEIGHT: 267.06 LBS | HEART RATE: 68 BPM | OXYGEN SATURATION: 99 % | HEIGHT: 71 IN | BODY MASS INDEX: 37.39 KG/M2 | SYSTOLIC BLOOD PRESSURE: 132 MMHG

## 2021-12-10 DIAGNOSIS — M85.89 OSTEOPENIA OF MULTIPLE SITES: ICD-10-CM

## 2021-12-10 DIAGNOSIS — Z12.31 ENCOUNTER FOR SCREENING MAMMOGRAM FOR BREAST CANCER: ICD-10-CM

## 2021-12-10 DIAGNOSIS — M51.36 DDD (DEGENERATIVE DISC DISEASE), LUMBAR: Primary | ICD-10-CM

## 2021-12-10 PROCEDURE — 99213 OFFICE O/P EST LOW 20 MIN: CPT | Mod: S$PBB,,, | Performed by: FAMILY MEDICINE

## 2021-12-10 PROCEDURE — 99999 PR PBB SHADOW E&M-EST. PATIENT-LVL V: ICD-10-PCS | Mod: PBBFAC,,, | Performed by: FAMILY MEDICINE

## 2021-12-10 PROCEDURE — 99213 PR OFFICE/OUTPT VISIT, EST, LEVL III, 20-29 MIN: ICD-10-PCS | Mod: S$PBB,,, | Performed by: FAMILY MEDICINE

## 2021-12-10 PROCEDURE — 99215 OFFICE O/P EST HI 40 MIN: CPT | Mod: PBBFAC,PN | Performed by: FAMILY MEDICINE

## 2021-12-10 PROCEDURE — 99999 PR PBB SHADOW E&M-EST. PATIENT-LVL V: CPT | Mod: PBBFAC,,, | Performed by: FAMILY MEDICINE

## 2021-12-10 RX ORDER — TRAMADOL HYDROCHLORIDE 50 MG/1
50 TABLET ORAL
Qty: 90 TABLET | Refills: 1 | Status: SHIPPED | OUTPATIENT
Start: 2021-12-10 | End: 2022-11-03 | Stop reason: SDUPTHER

## 2021-12-15 RX ORDER — MONTELUKAST SODIUM 10 MG/1
TABLET ORAL
Qty: 90 TABLET | Refills: 3 | Status: SHIPPED | OUTPATIENT
Start: 2021-12-15 | End: 2022-12-08

## 2022-01-06 DIAGNOSIS — U07.1 COVID-19 VIRUS DETECTED: ICD-10-CM

## 2022-01-19 ENCOUNTER — OFFICE VISIT (OUTPATIENT)
Dept: PRIMARY CARE CLINIC | Facility: CLINIC | Age: 72
End: 2022-01-19
Payer: MEDICARE

## 2022-01-19 VITALS
OXYGEN SATURATION: 99 % | WEIGHT: 264.56 LBS | SYSTOLIC BLOOD PRESSURE: 136 MMHG | BODY MASS INDEX: 37.04 KG/M2 | HEART RATE: 60 BPM | DIASTOLIC BLOOD PRESSURE: 78 MMHG | RESPIRATION RATE: 18 BRPM | HEIGHT: 71 IN

## 2022-01-19 DIAGNOSIS — J45.21 MILD INTERMITTENT ASTHMA WITH ACUTE EXACERBATION: ICD-10-CM

## 2022-01-19 DIAGNOSIS — U07.1 COVID-19 VIRUS INFECTION: Primary | ICD-10-CM

## 2022-01-19 PROCEDURE — 99213 OFFICE O/P EST LOW 20 MIN: CPT | Mod: S$PBB,CR,, | Performed by: FAMILY MEDICINE

## 2022-01-19 PROCEDURE — 99214 OFFICE O/P EST MOD 30 MIN: CPT | Mod: PBBFAC,PN | Performed by: FAMILY MEDICINE

## 2022-01-19 PROCEDURE — 99213 PR OFFICE/OUTPT VISIT, EST, LEVL III, 20-29 MIN: ICD-10-PCS | Mod: S$PBB,CR,, | Performed by: FAMILY MEDICINE

## 2022-01-19 PROCEDURE — 99999 PR PBB SHADOW E&M-EST. PATIENT-LVL IV: CPT | Mod: PBBFAC,CR,, | Performed by: FAMILY MEDICINE

## 2022-01-19 PROCEDURE — 99999 PR PBB SHADOW E&M-EST. PATIENT-LVL IV: ICD-10-PCS | Mod: PBBFAC,CR,, | Performed by: FAMILY MEDICINE

## 2022-01-19 RX ORDER — ALBUTEROL SULFATE 90 UG/1
2 AEROSOL, METERED RESPIRATORY (INHALATION) EVERY 4 HOURS PRN
Qty: 8 G | Refills: 2 | Status: SHIPPED | OUTPATIENT
Start: 2022-01-19

## 2022-01-19 RX ORDER — ALBUTEROL SULFATE 0.83 MG/ML
2.5 SOLUTION RESPIRATORY (INHALATION) EVERY 4 HOURS PRN
Qty: 150 ML | Refills: 3 | Status: SHIPPED | OUTPATIENT
Start: 2022-01-19

## 2022-01-19 NOTE — PROGRESS NOTES
"Subjective:       Patient ID: Sveta العراقي is a 71 y.o. female.    Chief Complaint: Follow-up (Says she was told to follow up since having COVID )    Developed worsening sore throat beginning on 1/1, then developed productive cough, so went to ER, tested positive for COVID on 1/4. Since then, has started to feel much better overall. Initially was SOB with activity, but now almost back to normal, cough almost resolved. No fever or chills, no vomiting or diarrhea.    Review of Systems   Constitutional: Negative for chills and fever.   HENT: Positive for congestion and sore throat.    Respiratory: Positive for cough.    Cardiovascular: Negative for chest pain.   Gastrointestinal: Negative for diarrhea and vomiting.   Allergic/Immunologic: Negative for immunocompromised state.   Neurological: Negative for dizziness.       Objective:      Vitals:    01/19/22 1248   BP: 136/78   BP Location: Left arm   Patient Position: Sitting   BP Method: Large (Manual)   Pulse: 60   Resp: 18   SpO2: 99%   Weight: 120 kg (264 lb 8.8 oz)   Height: 5' 11" (1.803 m)     Physical Exam  Vitals and nursing note reviewed.   Constitutional:       Appearance: She is well-developed and well-nourished.   HENT:      Head: Normocephalic and atraumatic.   Cardiovascular:      Rate and Rhythm: Normal rate and regular rhythm.      Heart sounds: Normal heart sounds.   Pulmonary:      Effort: Pulmonary effort is normal.      Breath sounds: Normal breath sounds.   Musculoskeletal:         General: No edema.   Skin:     General: Skin is warm and dry.   Neurological:      Mental Status: She is alert and oriented to person, place, and time.         Lab Results   Component Value Date    WBC 7.20 03/19/2021    HGB 11.8 (L) 03/19/2021    HCT 34.7 (L) 03/19/2021     03/19/2021    CHOL 173 08/08/2018    TRIG 77 08/08/2018    HDL 89 (H) 08/08/2018    ALT 16 03/19/2021    AST 20 03/19/2021     03/19/2021    K 4.3 03/19/2021     03/19/2021 "    CREATININE 1.3 03/19/2021    BUN 41 (H) 03/19/2021    CO2 21 (L) 03/19/2021    TSH 2.310 12/11/2017    INR 1.1 07/19/2019    HGBA1C 5.1 12/11/2017      Assessment:       1. COVID-19 virus infection    2. Mild intermittent asthma with acute exacerbation        Plan:       COVID-19 virus infection  Clinically improved. Continue supportive care, f/u PRN  Mild intermittent asthma with acute exacerbation  -     albuterol (PROVENTIL) 2.5 mg /3 mL (0.083 %) nebulizer solution; Take 3 mLs (2.5 mg total) by nebulization every 4 (four) hours as needed for Wheezing or Shortness of Breath. Rescue  Dispense: 150 mL; Refill: 3  -     albuterol (PROVENTIL/VENTOLIN HFA) 90 mcg/actuation inhaler; Inhale 2 puffs into the lungs every 4 (four) hours as needed for Wheezing or Shortness of Breath. Rescue  Dispense: 8 g; Refill: 2      Medication List with Changes/Refills   Current Medications    ALENDRONATE (FOSAMAX) 35 MG TABLET    TAKE 1 TABLET EVERY 7 DAYS    ATORVASTATIN (LIPITOR) 20 MG TABLET    Take 1 tablet by mouth once daily.    CALCIUM CARBONATE-VIT D3- MG CALCIUM- 400 UNIT TAB    Take 1 tablet by mouth 2 (two) times daily.    CYCLOBENZAPRINE (FLEXERIL) 10 MG TABLET    Take 1 tablet (10 mg total) by mouth daily as needed for Muscle spasms.    LIDOCAINE-PRILOCAINE (EMLA) CREAM    Apply topically 3 (three) times daily as needed.    METOPROLOL SUCCINATE (TOPROL-XL) 25 MG 24 HR TABLET    Take 25 mg by mouth once daily.    MONTELUKAST (SINGULAIR) 10 MG TABLET    TAKE 1 TABLET DAILY IN THE EVENING    POTASSIUM CHLORIDE (KLOR-CON) 8 MEQ TBSR    TAKE 1 TABLET DAILY    PRIMIDONE (MYSOLINE) 50 MG TAB    Take 50 mg by mouth every evening.    PROMETHAZINE (PHENERGAN) 25 MG TABLET    TAKE 1 TABLET EVERY 6 HOURS AS NEEDED FOR NAUSEA    SERTRALINE (ZOLOFT) 100 MG TABLET    TAKE 1 TABLET DAILY    TRAMADOL (ULTRAM) 50 MG TABLET    Take 1 tablet (50 mg total) by mouth every 24 hours as needed for Pain.    TRAZODONE (DESYREL) 100 MG  TABLET    TAKE 1 TABLET DAILY AT BEDTIME    XARELTO 15 MG TAB    Take 15 mg by mouth once daily.   Changed and/or Refilled Medications    Modified Medication Previous Medication    ALBUTEROL (PROVENTIL) 2.5 MG /3 ML (0.083 %) NEBULIZER SOLUTION albuterol (PROVENTIL) 2.5 mg /3 mL (0.083 %) nebulizer solution       Take 3 mLs (2.5 mg total) by nebulization every 4 (four) hours as needed for Wheezing or Shortness of Breath. Rescue    Take 3 mLs (2.5 mg total) by nebulization every 4 (four) hours as needed for Wheezing or Shortness of Breath. Rescue    ALBUTEROL (PROVENTIL/VENTOLIN HFA) 90 MCG/ACTUATION INHALER albuterol (PROVENTIL/VENTOLIN HFA) 90 mcg/actuation inhaler       Inhale 2 puffs into the lungs every 4 (four) hours as needed for Wheezing or Shortness of Breath. Rescue    Inhale 2 puffs into the lungs every 4 (four) hours as needed for Wheezing or Shortness of Breath. Rescue   Discontinued Medications    BENZONATATE (TESSALON) 100 MG CAPSULE    Take 1 capsule (100 mg total) by mouth 3 (three) times daily as needed for Cough.    METHOCARBAMOL (ROBAXIN) 500 MG TAB    Take 500 mg by mouth 3 (three) times daily.

## 2022-01-21 ENCOUNTER — PATIENT MESSAGE (OUTPATIENT)
Dept: ADMINISTRATIVE | Facility: HOSPITAL | Age: 72
End: 2022-01-21
Payer: MEDICARE

## 2022-03-03 ENCOUNTER — HOSPITAL ENCOUNTER (OUTPATIENT)
Dept: RADIOLOGY | Facility: HOSPITAL | Age: 72
Discharge: HOME | End: 2022-03-03
Attending: FAMILY MEDICINE
Payer: MEDICARE

## 2022-03-03 VITALS — WEIGHT: 264.56 LBS | HEIGHT: 71 IN | BODY MASS INDEX: 37.04 KG/M2

## 2022-03-03 DIAGNOSIS — Z12.31 ENCOUNTER FOR SCREENING MAMMOGRAM FOR BREAST CANCER: ICD-10-CM

## 2022-03-03 PROCEDURE — 77063 BREAST TOMOSYNTHESIS BI: CPT | Mod: TC,PO

## 2022-04-05 DIAGNOSIS — Z71.89 COMPLEX CARE COORDINATION: ICD-10-CM

## 2022-04-14 RX ORDER — SERTRALINE HYDROCHLORIDE 100 MG/1
TABLET, FILM COATED ORAL
Qty: 90 TABLET | Refills: 3 | Status: SHIPPED | OUTPATIENT
Start: 2022-04-14 | End: 2023-04-17

## 2022-04-14 NOTE — TELEPHONE ENCOUNTER
No new care gaps identified.  Powered by [employer]. Reference number: [id number].   4/14/2022 2:32:11 AM CDT

## 2022-07-05 ENCOUNTER — TELEPHONE (OUTPATIENT)
Dept: PRIMARY CARE CLINIC | Facility: CLINIC | Age: 72
End: 2022-07-05
Payer: MEDICARE

## 2022-07-05 NOTE — TELEPHONE ENCOUNTER
----- Message from Denisha Guillen sent at 7/5/2022  8:37 AM CDT -----  Contact: Pt [phone]  Caller is requesting an earlier appointment then we can schedule.  Caller is requesting a message be sent to the provider.  If this is for urgent care symptoms, did you offer other providers at this location, providers at other locations, or [facility]? (yes, no, n/a):  na  If this is for the patients physical, did you offer to schedule next available and put on wait list, or to see NP or PA for their physical?  (yes, no, n/a):  na  When is the next available appointment with their provider:  8/28/2022  Reason for the appointment:  ER Follow up/numbness in legs  Patient preference of timeframe to be scheduled:  ASAP  Would the patient like a call back, or a response through their [facility] portal?:   Call back  Comments:      Please call and advise.    Thank You

## 2022-07-13 ENCOUNTER — OFFICE VISIT (OUTPATIENT)
Dept: PRIMARY CARE CLINIC | Facility: CLINIC | Age: 72
End: 2022-07-13
Payer: MEDICARE

## 2022-07-13 VITALS
RESPIRATION RATE: 18 BRPM | TEMPERATURE: 98 F | HEIGHT: 71 IN | WEIGHT: 267.5 LBS | SYSTOLIC BLOOD PRESSURE: 136 MMHG | OXYGEN SATURATION: 99 % | HEART RATE: 58 BPM | DIASTOLIC BLOOD PRESSURE: 88 MMHG | BODY MASS INDEX: 37.45 KG/M2

## 2022-07-13 DIAGNOSIS — M50.30 DDD (DEGENERATIVE DISC DISEASE), CERVICAL: ICD-10-CM

## 2022-07-13 DIAGNOSIS — J45.20 MILD INTERMITTENT ASTHMA WITHOUT COMPLICATION: ICD-10-CM

## 2022-07-13 DIAGNOSIS — I69.851 HEMIPLEGIA AND HEMIPARESIS FOLLOWING OTHER CEREBROVASCULAR DISEASE AFFECTING RIGHT DOMINANT SIDE: ICD-10-CM

## 2022-07-13 DIAGNOSIS — M51.36 DDD (DEGENERATIVE DISC DISEASE), LUMBAR: Primary | ICD-10-CM

## 2022-07-13 DIAGNOSIS — M85.89 OSTEOPENIA OF MULTIPLE SITES: ICD-10-CM

## 2022-07-13 DIAGNOSIS — M47.816 FACET ARTHRITIS OF LUMBAR REGION: ICD-10-CM

## 2022-07-13 DIAGNOSIS — M54.16 LUMBAR RADICULOPATHY: ICD-10-CM

## 2022-07-13 DIAGNOSIS — E78.00 PURE HYPERCHOLESTEROLEMIA: ICD-10-CM

## 2022-07-13 DIAGNOSIS — I10 ESSENTIAL HYPERTENSION, BENIGN: ICD-10-CM

## 2022-07-13 DIAGNOSIS — M54.40 BILATERAL LOW BACK PAIN WITH SCIATICA, SCIATICA LATERALITY UNSPECIFIED, UNSPECIFIED CHRONICITY: ICD-10-CM

## 2022-07-13 DIAGNOSIS — M47.816 LUMBAR SPONDYLOSIS: ICD-10-CM

## 2022-07-13 DIAGNOSIS — M54.32 SCIATICA, LEFT SIDE: ICD-10-CM

## 2022-07-13 PROCEDURE — 99999 PR PBB SHADOW E&M-EST. PATIENT-LVL III: ICD-10-PCS | Mod: PBBFAC,,, | Performed by: FAMILY MEDICINE

## 2022-07-13 PROCEDURE — 99213 OFFICE O/P EST LOW 20 MIN: CPT | Mod: PBBFAC,PN | Performed by: FAMILY MEDICINE

## 2022-07-13 PROCEDURE — 99214 OFFICE O/P EST MOD 30 MIN: CPT | Mod: S$PBB,,, | Performed by: FAMILY MEDICINE

## 2022-07-13 PROCEDURE — 96372 THER/PROPH/DIAG INJ SC/IM: CPT | Mod: PBBFAC,PN

## 2022-07-13 PROCEDURE — 99999 PR PBB SHADOW E&M-EST. PATIENT-LVL III: CPT | Mod: PBBFAC,,, | Performed by: FAMILY MEDICINE

## 2022-07-13 PROCEDURE — 99214 PR OFFICE/OUTPT VISIT, EST, LEVL IV, 30-39 MIN: ICD-10-PCS | Mod: S$PBB,,, | Performed by: FAMILY MEDICINE

## 2022-07-13 RX ORDER — HYDROCODONE BITARTRATE AND ACETAMINOPHEN 5; 325 MG/1; MG/1
1 TABLET ORAL EVERY 8 HOURS PRN
Qty: 30 TABLET | Refills: 0 | Status: SHIPPED | OUTPATIENT
Start: 2022-07-13 | End: 2022-09-06

## 2022-07-13 RX ORDER — TRIAMCINOLONE ACETONIDE 40 MG/ML
40 INJECTION, SUSPENSION INTRA-ARTICULAR; INTRAMUSCULAR ONCE
Status: COMPLETED | OUTPATIENT
Start: 2022-07-13 | End: 2022-07-13

## 2022-07-13 RX ORDER — CYCLOBENZAPRINE HCL 10 MG
10 TABLET ORAL DAILY PRN
Qty: 90 TABLET | Refills: 1 | Status: SHIPPED | OUTPATIENT
Start: 2022-07-13 | End: 2023-07-28

## 2022-07-13 RX ORDER — PREDNISONE 20 MG/1
20 TABLET ORAL DAILY
Qty: 10 TABLET | Refills: 0 | Status: SHIPPED | OUTPATIENT
Start: 2022-07-13 | End: 2022-07-28

## 2022-07-13 RX ADMIN — TRIAMCINOLONE ACETONIDE 40 MG: 40 INJECTION, SUSPENSION INTRA-ARTICULAR; INTRAMUSCULAR at 11:07

## 2022-07-13 NOTE — PROGRESS NOTES
Subjective:       Patient ID: Sveta العراقي is a 72 y.o. female.    Chief Complaint: Leg Pain    HPI:  72-year-old white female in for pain in left leg and tingling--patient had multiple epidural steroid injections in the lower back in June had a rhizotomy --burning several of the nerves in the back.  When woke up from the anesthesia states her left leg felt different--since then tingling blood in the back side of the left thigh to the side of the left lower leg and to the foot and toes.  General pain throughout the whole leg--unable to stop it. Dr Whiteside in clinic in [facility] .  Patient seen orthopedic surgeon Karla who referred patient to Dr. Whiteside.  Patient call Dr. Whiteside days after procedure--told to wait 2 weeks.  Friday before 4th of July called again--patient told he has book until September --told if not feeling good to go to the emergency room. Has appt middle of August. Pt went to ER        Patient was on tramadol Q 24 hours--with Dr Colin on pain contract so ER MD unable give any pain medication .    ROS:  Skin: no psoriasis, eczema, skin cancer  HEENT: No headache, ocular pain, blurred vision, diplopia, epistaxis, hoarseness change in voice, thyroid trouble  Lung: No pneumonia, +asthma,  No Tb, wheezing, SOB, no smoking  Heart: No chest pain, ankle edema, palpitations, MI, josselin murmur,+ hypertension,+ hyperlipidemia--atrial fib on Xarelto--no stent or bypass  Abdomen:  +GERD No nausea, vomiting, diarrhea, constipation, ulcers, hepatitis, gallbladder disease, melena, hematochezia, hematemesis  : no UTI, renal disease, stones  GYN hysterectomy  MS: no fractures, O/A, lupus, rheumatoid, gout--history of fractured right hand fractured right finger--history DDD lumbar DDD cervical spine with radiculopathy spondylosis facet arthropathy of the lumbar spine  Neuro: No dizziness, LOC, seizures CVA with right hemiparesis and TIA--history of a tremor in the left leg prior to  procedure  No diabetes, no anemia, no anxiety, no depression history posttraumatic stress from childhood on Medicare   3 children work disabled now on social security lives with son     Objective:   Physical Exam:  General: Well nourished, well developed, no acute distress +obese  Skin: No lesions  HEENT: Eyes PERRLA, EOM intact, nose patent, throat non-erythematous   NECK: Supple, no bruits, No JVD, no nodes  Lungs: Clear, no rales, rhonchi, wheezing  Heart: Regular rate and rhythm, no murmurs, gallops, or rubs  Abdomen: flat, bowel sounds positive, no tenderness, or organomegaly  MS:  Tenderness in lumbar spine L1-S1 bilaterally left greater than right--patient difficulty getting onto the exam table--1 walks has to walk stiff leg and with left leg states feels like a sponge worried it may give out--good flexion extension inversion eversion of the foot good flexion extension lower leg opposition able squat [facility] down able to arise  Neuro: Alert, CN intact, oriented X 3  Extremities: No cyanosis, clubbing, or edema         Assessment:       1. DDD (degenerative disc disease), lumbar    2. Bilateral low back pain with sciatica, sciatica laterality unspecified, unspecified chronicity    3. Sciatica, left side    4. DDD (degenerative disc disease), cervical    5. Lumbar radiculopathy    6. Lumbar spondylosis    7. Facet arthritis of lumbar region    8. 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 Mild intermittent asthma without complication    10. Essential hypertension, benign    11. Pure hypercholesterolemia    12. Osteopenia of multiple sites        Plan:       DDD (degenerative disc disease), lumbar    Bilateral low back pain with sciatica, sciatica laterality unspecified, unspecified chronicity  -     HYDROcodone-acetaminophen (NORCO) 5-325 mg per tablet; Take 1 tablet by mouth every 8 (eight) hours as needed for Pain.  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  Dispense: 90 tablet; Refill: 1  -     triamcinolone acetonide injection 40 mg  -     predniSONE (DELTASONE) 20 MG tablet; Take 1 tablet (20 mg total) by mouth once daily.  Dispense: 10 tablet; Refill: 0        History chronic back pain--multiple epidural steroid injections and affected--had lumbar rhizotomy--subsequently developed left sciatica with pain tingling and numbness--unable to see neurosurgeon at pain clinic until August--went to emergency room unable to get pain medication due to pain contract with Dr. Colin--I will give injection of Kenalog/prednisone 20 mg 1 p.o. q.d. times 10 days/no NSAIDs due to blood thinner/can use Flexeril 1 p.o. q.8 hours p.r.n. muscle spasm--will give Norco 5 mg 1 p.o. q.8 hours p.r.n. pain #30 then Dr. Colin will be back and decide what type of pain medication he would like to give to bridge now until neurosurgery appt   Other medical issues asthma hypertension hyperlipidemia atrial fibrillation GERD CVA with right hemiparesis history tremor left foot prior to rhizotomy /posttraumatic stress disorder--  Appointment see Dr. Colin at next available appoint

## 2022-07-28 ENCOUNTER — OFFICE VISIT (OUTPATIENT)
Dept: PRIMARY CARE CLINIC | Facility: CLINIC | Age: 72
End: 2022-07-28
Payer: MEDICARE

## 2022-07-28 VITALS
WEIGHT: 267.44 LBS | HEART RATE: 67 BPM | BODY MASS INDEX: 37.44 KG/M2 | DIASTOLIC BLOOD PRESSURE: 82 MMHG | RESPIRATION RATE: 18 BRPM | TEMPERATURE: 98 F | OXYGEN SATURATION: 97 % | HEIGHT: 71 IN | SYSTOLIC BLOOD PRESSURE: 152 MMHG

## 2022-07-28 DIAGNOSIS — E78.00 PURE HYPERCHOLESTEROLEMIA: ICD-10-CM

## 2022-07-28 DIAGNOSIS — M51.36 DDD (DEGENERATIVE DISC DISEASE), LUMBAR: ICD-10-CM

## 2022-07-28 DIAGNOSIS — J45.20 MILD INTERMITTENT ASTHMA WITHOUT COMPLICATION: ICD-10-CM

## 2022-07-28 DIAGNOSIS — J40 BRONCHITIS: ICD-10-CM

## 2022-07-28 DIAGNOSIS — J01.00 ACUTE NON-RECURRENT MAXILLARY SINUSITIS: Primary | ICD-10-CM

## 2022-07-28 DIAGNOSIS — K21.9 GASTROESOPHAGEAL REFLUX DISEASE WITHOUT ESOPHAGITIS: ICD-10-CM

## 2022-07-28 DIAGNOSIS — I10 ESSENTIAL HYPERTENSION, BENIGN: ICD-10-CM

## 2022-07-28 DIAGNOSIS — E66.01 SEVERE OBESITY (BMI 35.0-39.9) WITH COMORBIDITY: ICD-10-CM

## 2022-07-28 DIAGNOSIS — I48.0 PAROXYSMAL ATRIAL FIBRILLATION: ICD-10-CM

## 2022-07-28 DIAGNOSIS — M50.30 DDD (DEGENERATIVE DISC DISEASE), CERVICAL: ICD-10-CM

## 2022-07-28 PROCEDURE — 99214 OFFICE O/P EST MOD 30 MIN: CPT | Mod: PBBFAC,PN | Performed by: FAMILY MEDICINE

## 2022-07-28 PROCEDURE — 99214 OFFICE O/P EST MOD 30 MIN: CPT | Mod: S$PBB,,, | Performed by: FAMILY MEDICINE

## 2022-07-28 PROCEDURE — 99999 PR PBB SHADOW E&M-EST. PATIENT-LVL IV: ICD-10-PCS | Mod: PBBFAC,,, | Performed by: FAMILY MEDICINE

## 2022-07-28 PROCEDURE — 99214 PR OFFICE/OUTPT VISIT, EST, LEVL IV, 30-39 MIN: ICD-10-PCS | Mod: S$PBB,,, | Performed by: FAMILY MEDICINE

## 2022-07-28 PROCEDURE — 99999 PR PBB SHADOW E&M-EST. PATIENT-LVL IV: CPT | Mod: PBBFAC,,, | Performed by: FAMILY MEDICINE

## 2022-07-28 RX ORDER — LEVOFLOXACIN 500 MG/1
500 TABLET, FILM COATED ORAL DAILY
Qty: 10 TABLET | Refills: 0 | Status: SHIPPED | OUTPATIENT
Start: 2022-07-28 | End: 2022-08-07

## 2022-07-28 RX ORDER — BUDESONIDE AND FORMOTEROL FUMARATE DIHYDRATE 80; 4.5 UG/1; UG/1
2 AEROSOL RESPIRATORY (INHALATION) 2 TIMES DAILY
Qty: 10.2 G | Refills: 0 | Status: SHIPPED | OUTPATIENT
Start: 2022-07-28 | End: 2023-09-05 | Stop reason: SDUPTHER

## 2022-07-28 RX ORDER — PREDNISONE 5 MG/1
TABLET ORAL
Qty: 20 TABLET | Refills: 0 | Status: SHIPPED | OUTPATIENT
Start: 2022-07-28 | End: 2022-09-06 | Stop reason: ALTCHOICE

## 2022-07-28 RX ORDER — PROMETHAZINE HYDROCHLORIDE AND CODEINE PHOSPHATE 6.25; 1 MG/5ML; MG/5ML
5 SOLUTION ORAL EVERY 6 HOURS PRN
Qty: 180 ML | Refills: 0 | Status: SHIPPED | OUTPATIENT
Start: 2022-07-28 | End: 2022-09-06 | Stop reason: ALTCHOICE

## 2022-07-28 RX ORDER — LOSARTAN POTASSIUM 50 MG/1
50 TABLET ORAL DAILY
Qty: 90 TABLET | Refills: 3 | Status: CANCELLED | OUTPATIENT
Start: 2022-07-28 | End: 2023-07-28

## 2022-07-28 NOTE — PROGRESS NOTES
Subjective:       Patient ID: Sveta العراقي is a 72 y.o. female.    Chief Complaint: Shortness of Breath, Sore Throat, and Nasal Congestion    HPI:  [id number] in for URI--Started 2 days ago--no fever--+runny stuffy nose--+sore throat--+cough--+phlegm-yellow---+asthma--no pneumonia TB no smoking.  No nausea vomiting diarrhea.  Had 3 COVID vaccine--had 2-COVID tests.  Some pain in the left scapular area--2-3 x today comes and goes --some shortness of breath pulse ox 97. Took some breathing treatments.      Office visit 07/13/2022  72-year-old white female in for pain in left leg and tingling--patient had multiple epidural steroid injections in the lower back in June had a rhizotomy --burning several of the nerves in the back.  When woke up from the anesthesia states her left leg felt different--since then tingling blood in the back side of the left thigh to the side of the left lower leg and to the foot and toes.  General pain throughout the whole leg--unable to stop it. Dr Whiteside in clinic in [facility] .  Patient seen orthopedic surgeon Karla who referred patient to Dr. Whiteside.  Patient call Dr. Whiteside days after procedure--told to wait 2 weeks.  Friday before 4th of July called again--patient told he has book until September --told if not feeling good to go to the emergency room. Has appt middle of August. Pt went to ER        Patient was on tramadol Q 24 hours--with Dr Colin on pain contract so ER MD unable give any pain medication .    ROS:  No significant change except for history of present illness  Skin: no psoriasis, eczema, skin cancer  HEENT: No headache, ocular pain, blurred vision, diplopia, epistaxis, hoarseness change in voice, thyroid trouble  Lung: No pneumonia, +asthma,  No Tb, wheezing, SOB, no smoking  Heart: No chest pain, ankle edema, palpitations, MI, josselin murmur,+ hypertension,+ hyperlipidemia--atrial fib on Xarelto--no stent or bypass  Abdomen:  +GERD No  nausea, vomiting, diarrhea, constipation, ulcers, hepatitis, gallbladder disease, melena, hematochezia, hematemesis  : no UTI, renal disease, stones  GYN hysterectomy  MS: no fractures, O/A, lupus, rheumatoid, gout--history of fractured right hand fractured right finger--history DDD lumbar DDD cervical spine with radiculopathy spondylosis facet arthropathy of the lumbar spine  Neuro: No dizziness, LOC, seizures CVA with right hemiparesis and TIA--history of a tremor in the left leg prior to procedure  No diabetes, no anemia, no anxiety, no depression history posttraumatic stress from childhood on Medicare   3 children work disabled now on social security lives with son     Objective:   Physical Exam:  General: Well nourished, well developed, no acute distress +obese  Skin: No lesions  HEENT: Eyes PERRLA, EOM intact, nose patent, throat non-erythematous   NECK: Supple, no bruits, No JVD, no nodes  Lungs: --coarse cough positive rhonchi no wheezing at this time no rales at this time  Heart: Regular rate and rhythm, no murmurs, gallops, or rubs  Abdomen: flat, bowel sounds positive, no tenderness, or organomegaly  MS:  No significant chain  Tenderness in lumbar spine L1-S1 bilaterally left greater than right--patient difficulty getting onto the exam table--1 walks has to walk stiff leg and with left leg states feels like a sponge worried it may give out--good flexion extension inversion eversion of the foot good flexion extension lower leg opposition able squat senior living down able to arise  Neuro: Alert, CN intact, oriented X 3  Extremities: No cyanosis, clubbing, or edema         Assessment:       1. Acute non-recurrent maxillary sinusitis    2. Bronchitis    3. Mild intermittent asthma without complication    4. Paroxysmal atrial fibrillation    5. Pure hypercholesterolemia    6. Essential hypertension, benign    7. Severe obesity (BMI 35.0-39.9) with comorbidity    8. Gastroesophageal reflux disease without  esophagitis    9. DDD (degenerative disc disease), cervical    10. DDD (degenerative disc disease), lumbar        Plan:       Acute non-recurrent maxillary sinusitis    Bronchitis  -     X-Ray Chest PA And Lateral; Future; Expected date: 07/28/2022    Mild intermittent asthma without complication    Paroxysmal atrial fibrillation    Pure hypercholesterolemia    Essential hypertension, benign    Severe obesity (BMI 35.0-39.9) with comorbidity    Gastroesophageal reflux disease without esophagitis    DDD (degenerative disc disease), cervical    DDD (degenerative disc disease), lumbar    Other orders  -     levoFLOXacin (LEVAQUIN) 500 MG tablet; Take 1 tablet (500 mg total) by mouth once daily. for 10 days  Dispense: 10 tablet; Refill: 0  -     promethazine-codeine 6.25-10 mg/5 ml (PHENERGAN WITH CODEINE) 6.25-10 mg/5 mL syrup; Take 5 mLs by mouth every 6 (six) hours as needed for Cough.  Dispense: 180 mL; Refill: 0  -     predniSONE (DELTASONE) 5 MG tablet; 4 po qd x 2, 3 po qd x2, 2 po qd x2, 1 po qd x2  Dispense: 20 tablet; Refill: 0  -     budesonide-formoterol 80-4.5 mcg (SYMBICORT) 80-4.5 mcg/actuation HFAA; Inhale 2 puffs into the lungs 2 (two) times a day. Controller  Dispense: 10.2 g; Refill: 0        Main Reason for Visit  Sinusitis/bronchitis--Levaquin 501 p.o. q.d. times 10 days/prednisone taper--no patient just finished 20 mg of prednisone for 10 days but due to pulmonary congestion will add low-dose steroid--fit--albuterol inhaler 2 puffs q.6 hours p.r.n. wheezing or cough or albuterol nebulizer 1 treatment q.6 hours p.r.n. wheezing cough--will add Symbicort 80 mg 2 puffs b.i.d. if covered by insure  Other medical illnesses  History chronic back pain--multiple epidural steroid injections and affected--had lumbar rhizotomy--subsequently developed left sciatica with pain tingling and numbness--unable to see neurosurgeon at pain clinic until August--went to emergency room unable to get pain medication due to  pain contract with Dr. Colin--I will give injection of Kenalog/prednisone 20 mg 1 p.o. q.d. times 10 days/no NSAIDs due to blood thinner/can use Flexeril 1 p.o. q.8 hours p.r.n. muscle spasm--will give Norco 5 mg 1 p.o. q.8 hours p.r.n. pain #30 then Dr. Colin will be back and decide what type of pain medication he would like to give to bridge now until neurosurgery appt   Other medical issues asthma hypertension hyperlipidemia atrial fibrillation GERD CVA with right hemiparesis history tremor left foot prior to rhizotomy /posttraumatic stress disorder--  Appointment see Dr. Colin at next available appoint

## 2022-08-08 ENCOUNTER — PATIENT MESSAGE (OUTPATIENT)
Dept: PRIMARY CARE CLINIC | Facility: CLINIC | Age: 72
End: 2022-08-08
Payer: MEDICARE

## 2022-08-26 ENCOUNTER — TELEPHONE (OUTPATIENT)
Dept: PRIMARY CARE CLINIC | Facility: CLINIC | Age: 72
End: 2022-08-26
Payer: MEDICARE

## 2022-08-26 NOTE — TELEPHONE ENCOUNTER
I recommend that she contact or follow up with Dr. Whiteside.  If her issue persists or she does not want to go to Dr. Whiteside, she needs an appointment to be seen.

## 2022-08-26 NOTE — TELEPHONE ENCOUNTER
----- Message from Funmi Lacy sent at 8/26/2022  4:11 PM CDT -----  Contact: [phone]  [email or web address]     Patient is calling for Medical Advice regarding:pain neck    How long has patient had these symptoms:x1week    Pharmacy name and phone#:      [id number] - [address] [address]  Phone: [phone] Fax: [phone]        Would like response via [employer]:  call    Comments:

## 2022-08-26 NOTE — TELEPHONE ENCOUNTER
Pt states was started on Lyrica by Dr. Oseas Whiteside for her back on 8/9/22 and started having extreme neck pain on 8/11/22 and didn't know if it was due to Lyrica so she d/c med that day.  States she can hardly move her neck and has pains shooting into her left arm. Has tried Tramadol, Flexeril, Biofreeze, ice, heat and Lidocaine 2.5%/Prilocaine topical with no relief. Request your advice going forward.

## 2022-08-26 NOTE — TELEPHONE ENCOUNTER
Called pt regarding PCP message. Inform pt of PCP message to follow up with . Pt stated she already has appt with PCP ON 09/06.

## 2022-08-31 DIAGNOSIS — I10 ESSENTIAL HYPERTENSION, BENIGN: ICD-10-CM

## 2022-09-02 ENCOUNTER — PES CALL (OUTPATIENT)
Dept: ADMINISTRATIVE | Facility: OTHER | Age: 72
End: 2022-09-02
Payer: MEDICARE

## 2022-09-06 ENCOUNTER — OFFICE VISIT (OUTPATIENT)
Dept: PRIMARY CARE CLINIC | Facility: CLINIC | Age: 72
End: 2022-09-06
Payer: MEDICARE

## 2022-09-06 VITALS
HEART RATE: 78 BPM | DIASTOLIC BLOOD PRESSURE: 72 MMHG | SYSTOLIC BLOOD PRESSURE: 118 MMHG | RESPIRATION RATE: 18 BRPM | BODY MASS INDEX: 36.93 KG/M2 | OXYGEN SATURATION: 96 % | TEMPERATURE: 98 F | HEIGHT: 72 IN | WEIGHT: 272.63 LBS

## 2022-09-06 DIAGNOSIS — N18.32 CHRONIC RENAL IMPAIRMENT, STAGE 3B: ICD-10-CM

## 2022-09-06 DIAGNOSIS — I48.0 PAROXYSMAL ATRIAL FIBRILLATION: ICD-10-CM

## 2022-09-06 DIAGNOSIS — E78.5 HYPERLIPIDEMIA, UNSPECIFIED HYPERLIPIDEMIA TYPE: ICD-10-CM

## 2022-09-06 DIAGNOSIS — M46.02 SPINAL ENTHESOPATHY, CERVICAL REGION: ICD-10-CM

## 2022-09-06 DIAGNOSIS — M50.30 DDD (DEGENERATIVE DISC DISEASE), CERVICAL: Primary | ICD-10-CM

## 2022-09-06 PROCEDURE — 99999 PR PBB SHADOW E&M-EST. PATIENT-LVL IV: CPT | Mod: PBBFAC,,, | Performed by: FAMILY MEDICINE

## 2022-09-06 PROCEDURE — 99214 OFFICE O/P EST MOD 30 MIN: CPT | Mod: PBBFAC,PN | Performed by: FAMILY MEDICINE

## 2022-09-06 PROCEDURE — 99214 OFFICE O/P EST MOD 30 MIN: CPT | Mod: S$PBB,,, | Performed by: FAMILY MEDICINE

## 2022-09-06 PROCEDURE — 99999 PR PBB SHADOW E&M-EST. PATIENT-LVL IV: ICD-10-PCS | Mod: PBBFAC,,, | Performed by: FAMILY MEDICINE

## 2022-09-06 PROCEDURE — 99214 PR OFFICE/OUTPT VISIT, EST, LEVL IV, 30-39 MIN: ICD-10-PCS | Mod: S$PBB,,, | Performed by: FAMILY MEDICINE

## 2022-09-06 RX ORDER — LIDOCAINE 50 MG/G
1 PATCH TOPICAL DAILY
COMMUNITY
Start: 2022-08-29 | End: 2023-09-05

## 2022-09-06 RX ORDER — METHYLPREDNISOLONE 4 MG/1
TABLET ORAL
Qty: 21 TABLET | Refills: 0 | Status: SHIPPED | OUTPATIENT
Start: 2022-09-06 | End: 2023-09-05

## 2022-09-06 RX ORDER — METOPROLOL SUCCINATE 100 MG/1
100 TABLET, EXTENDED RELEASE ORAL DAILY
COMMUNITY

## 2022-09-06 RX ORDER — DIAZEPAM 5 MG/1
5 TABLET ORAL DAILY PRN
COMMUNITY
Start: 2022-08-29

## 2022-09-06 NOTE — PROGRESS NOTES
Subjective:       Patient ID: Sveta العراقي is a 72 y.o. female.    Chief Complaint: Follow-up (Routine)    Went to ER at [facility] 8/27, MRI of C-spine done, results not available at time of this visit    Neck Pain   This is a chronic problem. The current episode started more than 1 year ago. The problem has been gradually worsening (worsened over past month or so). The pain is associated with nothing. The pain is present in the left side. The quality of the pain is described as shooting and stabbing. The pain is severe. Pertinent negatives include no chest pain, fever, headaches, numbness or weakness. Treatments tried: Valium and Lidoderm. The treatment provided moderate relief.   Review of Systems   Constitutional:  Negative for fever.   Respiratory:  Negative for shortness of breath.    Cardiovascular:  Negative for chest pain.   Musculoskeletal:  Positive for neck pain.   Neurological:  Negative for weakness, numbness and headaches.   Psychiatric/Behavioral:  Positive for sleep disturbance. Negative for agitation.      Objective:      Vitals:    09/06/22 1331   BP: 118/72   BP Location: Right arm   Patient Position: Sitting   BP Method: Large (Manual)   Pulse: 78   Resp: 18   Temp: 97.7 °F (36.5 °C)   TempSrc: Temporal   SpO2: 96%   Weight: 123.6 kg (272 lb 9.6 oz)   Height: 6' (1.829 m)     Physical Exam  Vitals and nursing note reviewed.   Constitutional:       General: She is not in acute distress.     Appearance: Normal appearance. She is well-developed.   HENT:      Head: Normocephalic and atraumatic.   Cardiovascular:      Rate and Rhythm: Normal rate and regular rhythm.      Heart sounds: Normal heart sounds.   Pulmonary:      Effort: Pulmonary effort is normal.      Breath sounds: Normal breath sounds.   Musculoskeletal:      Cervical back: Spasms and tenderness present. No deformity. Pain with movement present. Decreased range of motion.      Right lower leg: No edema.      Left lower leg: No edema.    Skin:     General: Skin is warm and dry.   Neurological:      Mental Status: She is alert and oriented to person, place, and time.      Motor: Motor function is intact. No weakness.   Psychiatric:         Mood and Affect: Mood normal.         Behavior: Behavior normal.       Lab Results   Component Value Date    WBC 7.20 03/19/2021    HGB 11.8 (L) 03/19/2021    HCT 34.7 (L) 03/19/2021     03/19/2021    CHOL 173 08/08/2018    TRIG 77 08/08/2018    HDL 89 (H) 08/08/2018    ALT 16 03/19/2021    AST 20 03/19/2021     03/19/2021    K 4.3 03/19/2021     03/19/2021    CREATININE 1.3 03/19/2021    BUN 41 (H) 03/19/2021    CO2 21 (L) 03/19/2021    TSH 2.310 12/11/2017    INR 1.1 07/19/2019    HGBA1C 5.1 12/11/2017      Assessment:       1. DDD (degenerative disc disease), cervical    2. Spinal enthesopathy, cervical region    3. Chronic renal impairment, stage 3b    4. Hyperlipidemia, unspecified hyperlipidemia type    5. Paroxysmal atrial fibrillation        Plan:       DDD (degenerative disc disease), cervical  -     methylPREDNISolone (MEDROL DOSEPACK) 4 mg tablet; use as directed  Dispense: 21 tablet; Refill: 0    Spinal enthesopathy, cervical region  -     methylPREDNISolone (MEDROL DOSEPACK) 4 mg tablet; use as directed  Dispense: 21 tablet; Refill: 0  MALAIKA to get MRI report  Chronic renal impairment, stage 3b  Avoid nephrotoxins  Hyperlipidemia, unspecified hyperlipidemia type  Needs labs  Paroxysmal atrial fibrillation  Followed by cardiology at [facility]  Medication List with Changes/Refills   New Medications    METHYLPREDNISOLONE (MEDROL DOSEPACK) 4 MG TABLET    use as directed   Current Medications    ALBUTEROL (PROVENTIL) 2.5 MG /3 ML (0.083 %) NEBULIZER SOLUTION    Take 3 mLs (2.5 mg total) by nebulization every 4 (four) hours as needed for Wheezing or Shortness of Breath. Rescue    ALBUTEROL (PROVENTIL/VENTOLIN HFA) 90 MCG/ACTUATION INHALER    Inhale 2 puffs into the lungs every 4 (four) hours  as needed for Wheezing or Shortness of Breath. Rescue    ALENDRONATE (FOSAMAX) 35 MG TABLET    TAKE 1 TABLET EVERY 7 DAYS    ATORVASTATIN (LIPITOR) 20 MG TABLET    Take 1 tablet by mouth once daily.    BUDESONIDE-FORMOTEROL 80-4.5 MCG (SYMBICORT) 80-4.5 MCG/ACTUATION HFAA    Inhale 2 puffs into the lungs 2 (two) times a day. Controller    CALCIUM CARBONATE-VIT D3- MG CALCIUM- 400 UNIT TAB    Take 1 tablet by mouth 2 (two) times daily.    CYCLOBENZAPRINE (FLEXERIL) 10 MG TABLET    Take 1 tablet (10 mg total) by mouth daily as needed for Muscle spasms.    DIAZEPAM (VALIUM) 5 MG TABLET    Take 5 mg by mouth daily as needed.    LIDOCAINE (LIDODERM) 5 %    1 patch once daily.    LIDOCAINE-PRILOCAINE (EMLA) CREAM    Apply topically 3 (three) times daily as needed.    METOPROLOL SUCCINATE (TOPROL-XL) 100 MG 24 HR TABLET    Take 100 mg by mouth once daily.    MONTELUKAST (SINGULAIR) 10 MG TABLET    TAKE 1 TABLET DAILY IN THE EVENING    POTASSIUM CHLORIDE (KLOR-CON) 8 MEQ TBSR    TAKE 1 TABLET DAILY    PRIMIDONE (MYSOLINE) 50 MG TAB    Take 50 mg by mouth every evening.    PROMETHAZINE (PHENERGAN) 25 MG TABLET    TAKE 1 TABLET EVERY 6 HOURS AS NEEDED FOR NAUSEA    SERTRALINE (ZOLOFT) 100 MG TABLET    TAKE 1 TABLET DAILY    TRAMADOL (ULTRAM) 50 MG TABLET    Take 1 tablet (50 mg total) by mouth every 24 hours as needed for Pain.    TRAZODONE (DESYREL) 100 MG TABLET    TAKE 1 TABLET DAILY AT BEDTIME    XARELTO 15 MG TAB    Take 15 mg by mouth once daily.   Discontinued Medications    GABAPENTIN (NEURONTIN) 300 MG CAPSULE    Take 1 capsule (300 mg total) by mouth 3 (three) times daily.    HYDROCODONE-ACETAMINOPHEN (NORCO) 5-325 MG PER TABLET    Take 1 tablet by mouth every 8 (eight) hours as needed for Pain.    METOPROLOL SUCCINATE (TOPROL-XL) 25 MG 24 HR TABLET    Take 50 mg by mouth once daily.    PREDNISONE (DELTASONE) 5 MG TABLET    4 po qd x 2, 3 po qd x2, 2 po qd x2, 1 po qd x2    PROMETHAZINE-CODEINE 6.25-10 MG/5  ML (PHENERGAN WITH CODEINE) 6.25-10 MG/5 ML SYRUP    Take 5 mLs by mouth every 6 (six) hours as needed for Cough.

## 2022-09-14 ENCOUNTER — PATIENT OUTREACH (OUTPATIENT)
Dept: ADMINISTRATIVE | Facility: HOSPITAL | Age: 72
End: 2022-09-14
Payer: MEDICARE

## 2022-09-14 NOTE — LETTER
AUTHORIZATION FOR RELEASE OF   CONFIDENTIAL INFORMATION    Dear [facility] MEDICAL RECORDS,    We are seeing Sveta العراقي, date of birth 1950, in the clinic at [facility]. Kash Colin MD is the patient's PCP. Sveta العراقي has an outstanding lab/procedure at the time we reviewed her chart. In order to help keep her health information updated, she has authorized us to request the following medical record(s):        (  )  MAMMOGRAM                                      (  )  COLONOSCOPY      (  )  PAP SMEAR                                          (  )  OUTSIDE LAB RESULTS     (  )  DEXA SCAN                                          (  )  EYE EXAM            (  )  FOOT EXAM                                          (  )  ENTIRE RECORD     (  )  OUTSIDE IMMUNIZATIONS                 ( X ) MRI OF SPINE         Please fax records to [facility], Ryan M Truxillo, MD, [phone]    Patient Name: Sveta العراقي  : 1950  Patient Phone #: [phone]

## 2022-09-14 NOTE — PROGRESS NOTES
Health Maintenance Due   Topic Date Due    Shingles Vaccine (2 of 3) 01/04/2016    COVID-19 Vaccine (4 - Booster for Moderna series) 03/04/2022    Influenza Vaccine (1) 09/01/2022   Chart review done.  updated. Immunizations reviewed & updated. Care Everywhere updated.   MALAIKA SENT TO [facility] FOR THE PATIENT MRI OF THE SPINE

## 2022-09-27 ENCOUNTER — PATIENT MESSAGE (OUTPATIENT)
Dept: PRIMARY CARE CLINIC | Facility: CLINIC | Age: 72
End: 2022-09-27
Payer: MEDICARE

## 2022-10-25 RX ORDER — RIVAROXABAN 15 MG/1
15 TABLET, FILM COATED ORAL DAILY
Qty: 90 TABLET | Refills: 3 | Status: SHIPPED | OUTPATIENT
Start: 2022-10-25 | End: 2023-10-20

## 2022-10-25 RX ORDER — POTASSIUM CHLORIDE 600 MG/1
8 TABLET, FILM COATED, EXTENDED RELEASE ORAL DAILY
Qty: 90 TABLET | Refills: 3 | Status: SHIPPED | OUTPATIENT
Start: 2022-10-25 | End: 2023-10-20

## 2022-10-25 NOTE — TELEPHONE ENCOUNTER
Refill Routing Note   Medication(s) are not appropriate for processing by [facility] for the following reason(s):      - Outside of protocol  - Required laboratory values are outdated    [facility] action(s):  Defer  Route          Medication reconciliation completed: No     Appointments  past 12m or future 3m with PCP    Date Provider   Last Visit   9/6/2022 Kash Colin MD   Next Visit   Visit date not found Kash Colin MD   ED visits in past 90 days: 1        Note composed:4:16 PM 10/25/2022

## 2022-10-25 NOTE — TELEPHONE ENCOUNTER
No new care gaps identified.  [facility] Embedded Care Gaps. Reference number: [id number]. 10/25/2022   11:36:30 AM CDT

## 2022-10-25 NOTE — TELEPHONE ENCOUNTER
----- Message from Sabrinaayan Mendoza sent at 10/25/2022 11:18 AM CDT -----  Contact: [phone]  Requesting an RX refill or new RX.  Is this a refill or new RX: Refill 1  RX name and strength (copy/paste from chart):  XARELTO 15 mg Tab  Is this a 30 day or 90 day RX:   Pharmacy name and phone # (copy/paste from chart):  [facility] - [address]  Phone: [phone]  Fax:  [phone]      Requesting an RX refill or new RX.  Is this a refill or new RX: Refill 2  RX name and strength (copy/paste from chart):potassium chloride (KLOR-CON) 8 MEQ TbSR    Is this a 30 day or 90 day RX:   Pharmacy name and phone # (copy/paste from chart):  [facility] - [address]  Phone: [phone]  Fax:  [phone]        The doctors have asked that we provide their patients with the following 2 reminders -- prescription refills can take up to 72 hours, and a friendly reminder that in the future you can use your [facility] account to request refills:     Patient was told to f/u with PCP while she is able to find a new cardiology. Thank you

## 2022-11-03 ENCOUNTER — PATIENT MESSAGE (OUTPATIENT)
Dept: PRIMARY CARE CLINIC | Facility: CLINIC | Age: 72
End: 2022-11-03
Payer: MEDICARE

## 2022-11-03 DIAGNOSIS — Z71.89 COMPLEX CARE COORDINATION: ICD-10-CM

## 2022-11-03 DIAGNOSIS — M51.36 DDD (DEGENERATIVE DISC DISEASE), LUMBAR: ICD-10-CM

## 2022-11-03 RX ORDER — TRAMADOL HYDROCHLORIDE 50 MG/1
50 TABLET ORAL
Qty: 90 TABLET | Refills: 1 | Status: SHIPPED | OUTPATIENT
Start: 2022-11-03 | End: 2023-06-06 | Stop reason: SDUPTHER

## 2022-11-03 NOTE — TELEPHONE ENCOUNTER
Care Due:                  Date            Visit Type   Department     Provider  --------------------------------------------------------------------------------                                EP -                              PRIMARY      [facility]  Last Visit: 09-      CARE ([facility])   PRIMARY CARE   Kash Colin  Next Visit: None Scheduled  None         None Found                                                            Last  Test          Frequency    Reason                     Performed    Due Date  --------------------------------------------------------------------------------    CMP.........  12 months..  potassium................  03- 03-    [facility] Embedded Care Gaps. Reference number: [id number]. 11/03/2022   8:16:57 AM CDT

## 2022-12-01 NOTE — TELEPHONE ENCOUNTER
----- Message from Megan Mata sent at 10/29/2018  1:28 PM CDT -----  Contact: self  Patient is calling/around 12 noon today 10 29 18 patient started feeling very dizzy to the point she can hardly stand up/unable to reach a Nurse with [facility] on [facility]/I was advised by that Department that the nurses had high volumes of calls and to call patient's primary doctor/Gave call to Torrie at [facility] and she was going to get a nurse to talk to her as I was not able to reach any nurse   normal for race

## 2023-04-06 ENCOUNTER — EXTERNAL HOSPITAL ADMISSION (OUTPATIENT)
Dept: ADMINISTRATIVE | Facility: CLINIC | Age: 73
End: 2023-04-06
Payer: MEDICARE

## 2023-04-06 ENCOUNTER — PATIENT OUTREACH (OUTPATIENT)
Dept: ADMINISTRATIVE | Facility: CLINIC | Age: 73
End: 2023-04-06
Payer: MEDICARE

## 2023-04-06 NOTE — PROGRESS NOTES
C3 nurse spoke with Sveta العراقي for a TCC post hospital discharge follow up call. The patient does not have a scheduled HOSFU appointment with Kash Colin MD (PCP)  within 5-7 days post hospital discharge date 4/5/23. C3 nurse was unable to schedule HOSFU appointment in [facility].    Message sent to PCP staff requesting they contact patient and schedule follow up appointment.

## 2023-04-13 ENCOUNTER — TELEPHONE (OUTPATIENT)
Dept: PRIMARY CARE CLINIC | Facility: CLINIC | Age: 73
End: 2023-04-13
Payer: MEDICARE

## 2023-04-13 NOTE — TELEPHONE ENCOUNTER
----- Message from Sabrina Mendoza sent at 4/13/2023 10:40 AM CDT -----  Contact: [phone]  Caller is requesting an earlier appointment then we can schedule.  Caller is requesting a message be sent to the provider.  If this is for urgent care symptoms, did you offer other providers at this location, providers at other locations, or [facility]? (yes, no, n/a):  n/a  If this is for the patients physical, did you offer to schedule next available and put on wait list, or to see NP or PA for their physical?  (yes, no, n/a):  n/a  When is the next available appointment with their provider:  05/03  Reason for the appointment:  f/u after discharge on 04/05 from [facility]  Patient preference of timeframe to be scheduled:  04/25 around 3- 4 pm  Would the patient like a call back, or a response through their [facility] portal?:   phone  Comments:  patient's daughter in law will be able to take her on that date. Thank you

## 2023-04-25 ENCOUNTER — OFFICE VISIT (OUTPATIENT)
Dept: PRIMARY CARE CLINIC | Facility: CLINIC | Age: 73
End: 2023-04-25
Payer: MEDICARE

## 2023-04-25 VITALS
HEART RATE: 69 BPM | RESPIRATION RATE: 18 BRPM | SYSTOLIC BLOOD PRESSURE: 118 MMHG | OXYGEN SATURATION: 96 % | DIASTOLIC BLOOD PRESSURE: 76 MMHG | TEMPERATURE: 97 F | HEIGHT: 72 IN | BODY MASS INDEX: 35.28 KG/M2 | WEIGHT: 260.5 LBS

## 2023-04-25 DIAGNOSIS — I48.0 PAROXYSMAL ATRIAL FIBRILLATION: ICD-10-CM

## 2023-04-25 DIAGNOSIS — I70.0 AORTIC ATHEROSCLEROSIS: ICD-10-CM

## 2023-04-25 DIAGNOSIS — M85.89 OSTEOPENIA OF MULTIPLE SITES: ICD-10-CM

## 2023-04-25 DIAGNOSIS — N18.32 CHRONIC RENAL IMPAIRMENT, STAGE 3B: ICD-10-CM

## 2023-04-25 DIAGNOSIS — I10 ESSENTIAL HYPERTENSION, BENIGN: ICD-10-CM

## 2023-04-25 DIAGNOSIS — E66.01 SEVERE OBESITY (BMI 35.0-39.9) WITH COMORBIDITY: ICD-10-CM

## 2023-04-25 DIAGNOSIS — A41.2: ICD-10-CM

## 2023-04-25 DIAGNOSIS — R06.02 SHORTNESS OF BREATH: ICD-10-CM

## 2023-04-25 DIAGNOSIS — Z12.31 ENCOUNTER FOR SCREENING MAMMOGRAM FOR MALIGNANT NEOPLASM OF BREAST: Primary | ICD-10-CM

## 2023-04-25 DIAGNOSIS — I69.851 HEMIPLEGIA AND HEMIPARESIS FOLLOWING OTHER CEREBROVASCULAR DISEASE AFFECTING RIGHT DOMINANT SIDE: ICD-10-CM

## 2023-04-25 DIAGNOSIS — K21.9 GASTROESOPHAGEAL REFLUX DISEASE WITHOUT ESOPHAGITIS: ICD-10-CM

## 2023-04-25 DIAGNOSIS — E78.00 PURE HYPERCHOLESTEROLEMIA: ICD-10-CM

## 2023-04-25 DIAGNOSIS — J45.20 MILD INTERMITTENT ASTHMA WITHOUT COMPLICATION: ICD-10-CM

## 2023-04-25 PROBLEM — M46.02 SPINAL ENTHESOPATHY, CERVICAL REGION: Status: RESOLVED | Noted: 2022-09-06 | Resolved: 2023-04-25

## 2023-04-25 PROCEDURE — 99214 OFFICE O/P EST MOD 30 MIN: CPT | Mod: S$PBB,,, | Performed by: FAMILY MEDICINE

## 2023-04-25 PROCEDURE — 99215 OFFICE O/P EST HI 40 MIN: CPT | Mod: PBBFAC,PN | Performed by: FAMILY MEDICINE

## 2023-04-25 PROCEDURE — 99999 PR PBB SHADOW E&M-EST. PATIENT-LVL V: CPT | Mod: PBBFAC,,, | Performed by: FAMILY MEDICINE

## 2023-04-25 PROCEDURE — 99214 PR OFFICE/OUTPT VISIT, EST, LEVL IV, 30-39 MIN: ICD-10-PCS | Mod: S$PBB,,, | Performed by: FAMILY MEDICINE

## 2023-04-25 PROCEDURE — 99999 PR PBB SHADOW E&M-EST. PATIENT-LVL V: ICD-10-PCS | Mod: PBBFAC,,, | Performed by: FAMILY MEDICINE

## 2023-04-25 NOTE — PROGRESS NOTES
Subjective:       Patient ID: Sveta العراقي is a 72 y.o. female.    Chief Complaint: Follow-up (HOSPITAL)    HPI:  [id number]--hospital follow up /SOB--patient seen emergency room [id number] diagnosed as sepsis with bilateral pneumonia with Staph--treated with vancomycin--discharge 04/05/2023  Patient was discharged on prednisone--no AB  .   Lab was reviewed glucose 148 glomerular filtration 38 COVID was negative magnesium BMP normal INR 2.3 troponin slightly increased 18 hematocrit 33.3 white count 17.1 blood culture positive for Staph       Patient states went to the emergency room because had severe shortness of breath was barely able to breathe--patient was told to walk down the hallway to a room she had to stop twice of the head to bring the bed to the patient.  Patient then was admitted placed on IV antibiotic discharge on prednisone       One patient is walking gets out of breath very easily--after walking into the exam room initial pulse ox was 92 after resting pulse ox went up to 96.  Years ago patient was on oxygen--x 1-2 yrs   No wheezing that stopped in hospital   Eating okay--+BM--patient able ambulate but short of breath after short distances  Hx PE 1999 also has atrial fib patient on Xarelto  Patient is scheduled for electrical ablation of atrial fib told had 80% chance for success this was ordered due to patient having a high heart rate in atrial area.      ROS no significant change except for history of present illness  Skin: no psoriasis, eczema, skin cancer  HEENT: No headache, ocular pain, blurred vision, diplopia, epistaxis, hoarseness change in voice, thyroid trouble  Lung: No pneumonia, +asthma,  No Tb, wheezing, SOB, no smoking  Heart: No chest pain, ankle edema, palpitations, MI, josselin murmur,+ hypertension,+ hyperlipidemia--atrial fib on Xarelto--no stent or bypass  Abdomen:  +GERD No nausea, vomiting, diarrhea, constipation, ulcers, hepatitis, gallbladder disease, melena,  hematochezia, hematemesis  : no UTI, renal disease, stones  GYN hysterectomy  MS: no fractures, O/A, lupus, rheumatoid, gout--history of fractured right hand fractured right finger--history DDD lumbar DDD cervical spine with radiculopathy spondylosis facet arthropathy of the lumbar spine  Neuro: No dizziness, LOC, seizures CVA with right hemiparesis and TIA--history of a tremor in the left leg prior to procedure  No diabetes, no anemia, no anxiety, no depression history posttraumatic stress from childhood on Medicare   3 children work disabled now on social security lives with son     Objective:   Physical Exam:  General: Well nourished, well developed, no acute distress +obese  Skin: No lesions  HEENT: Eyes PERRLA, EOM intact, nose patent, throat non-erythematous   NECK: Supple, no bruits, No JVD, no nodes  Lungs: --coarse BS no rales no rhonchi no wheezing at this time   Heart: Regular rate and rhythm, no murmurs, gallops, or rubs  Abdomen: flat, bowel sounds positive, no tenderness, or organomegaly  MS:  not rechecked this visit Tenderness in lumbar spine L1-S1 bilaterally left greater than right--patient difficulty getting onto the exam table--1 walks has to walk stiff leg and with left leg states feels like a sponge worried it may give out--good flexion extension inversion eversion of the foot good flexion extension lower leg opposition able squat shelter down able to arise  Neuro: Alert, CN intact, oriented X 3  Extremities: No cyanosis, clubbing, or edema         Assessment:       1. Encounter for screening mammogram for malignant neoplasm of breast        Plan:       Encounter for screening mammogram for malignant neoplasm of breast        Main Reason for Visit  Recent hospital admit 3/3/53577-44/5/2023 for bilateral pneumonia with Staph sepsis treated with IV vancomycin discharged on prednisone  History atrial fibrillation scheduled for an ablation on Xarelto  History of pulmonary embolus  1999  Shortness of breath after walking short distances patient feel secondary to pulmonary status and atrial fibrillation thinks correction of atrial fibrillation will improve shortness of breath--patient was on oxygen in the past for 1-2 years when left the hospital said she did 1 oxygen a possible needs to do 6 minute walk because patient gets short of breath after walking very short distances will also get EKG Chest Xray pulse ox 96 at this time but 92 after walking to the exam room  Other medical illnesses  History chronic back pain--multiple epidural steroid injections and affected--had lumbar rhizotomy--subsequently developed left sciatica with pain tingling and numbness--unable to see neurosurgeon at pain clinic until August--went to emergency room unable to get pain medication due to pain contract with Dr. Colin--  Other medical issues asthma hypertension hyperlipidemia atrial fibrillation GERD CVA with right hemiparesis history tremor left foot prior to rhizotomy /posttraumatic stress disorder--  Appointment see Dr. Colin at next available appoint  Assess patient just discharged from the hospital due CBCs CMP lipid TSH chest x-ray EKG6 min walk for O2

## 2023-05-08 ENCOUNTER — PATIENT OUTREACH (OUTPATIENT)
Dept: ADMINISTRATIVE | Facility: CLINIC | Age: 73
End: 2023-05-08
Payer: MEDICARE

## 2023-05-08 ENCOUNTER — EXTERNAL HOSPITAL ADMISSION (OUTPATIENT)
Dept: ADMINISTRATIVE | Facility: CLINIC | Age: 73
End: 2023-05-08
Payer: MEDICARE

## 2023-05-08 NOTE — PROGRESS NOTES
C3 nurse spoke with Sveta العراقي for a TCC post hospital discharge follow up call. The patient does not have a scheduled HOSFU appointment with Kash Colin MD (PCP) within 5-7 days post hospital discharge date 5/5/23. C3 nurse was unable to schedule HOSFU appointment in [facility].    Message sent to PCP staff requesting they contact patient and schedule follow up appointment. Patient would like to be seen on 5/12/23, after her testing is completed, if that is possible.     Patient has an established appointment with Ashok Gomez MD (PCP) 6/6/23 @ 10:40am for a 6 week follow up, but needs sooner appointment for HOSFU.     She was scheduled to see Cardiology at [facility] on 5/12/23, but will have to change that appointment, as she has several testing procedures scheduled with [facility] on that day.      New medications noted that are not in chart are: Colchicine 0.6mg BID (which has been causing diarrhea since starting), and  Protonix 40mg once daily.

## 2023-05-09 ENCOUNTER — HOSPITAL ENCOUNTER (OUTPATIENT)
Dept: RADIOLOGY | Facility: HOSPITAL | Age: 73
Discharge: HOME | End: 2023-05-09
Attending: FAMILY MEDICINE
Payer: MEDICARE

## 2023-05-09 DIAGNOSIS — Z12.31 ENCOUNTER FOR SCREENING MAMMOGRAM FOR MALIGNANT NEOPLASM OF BREAST: ICD-10-CM

## 2023-05-09 PROCEDURE — 77067 MAMMO DIGITAL SCREENING BILAT WITH TOMO: ICD-10-PCS | Mod: 26,,, | Performed by: RADIOLOGY

## 2023-05-09 PROCEDURE — 77063 BREAST TOMOSYNTHESIS BI: CPT | Mod: 26,,, | Performed by: RADIOLOGY

## 2023-05-09 PROCEDURE — 77063 MAMMO DIGITAL SCREENING BILAT WITH TOMO: ICD-10-PCS | Mod: 26,,, | Performed by: RADIOLOGY

## 2023-05-09 PROCEDURE — 77067 SCR MAMMO BI INCL CAD: CPT | Mod: TC

## 2023-05-09 PROCEDURE — 77067 SCR MAMMO BI INCL CAD: CPT | Mod: 26,,, | Performed by: RADIOLOGY

## 2023-05-30 ENCOUNTER — PATIENT MESSAGE (OUTPATIENT)
Dept: PRIMARY CARE CLINIC | Facility: CLINIC | Age: 73
End: 2023-05-30
Payer: MEDICARE

## 2023-05-30 DIAGNOSIS — M51.36 DDD (DEGENERATIVE DISC DISEASE), LUMBAR: ICD-10-CM

## 2023-05-30 RX ORDER — TRAMADOL HYDROCHLORIDE 50 MG/1
50 TABLET ORAL
Qty: 90 TABLET | Refills: 1 | OUTPATIENT
Start: 2023-05-30

## 2023-05-30 NOTE — TELEPHONE ENCOUNTER
No care due was identified.  [facility] Embedded Care Due Messages. Reference number: [id number].   5/30/2023 8:19:28 AM CDT

## 2023-06-03 DIAGNOSIS — Z71.89 COMPLEX CARE COORDINATION: ICD-10-CM

## 2023-06-06 ENCOUNTER — OFFICE VISIT (OUTPATIENT)
Dept: PRIMARY CARE CLINIC | Facility: CLINIC | Age: 73
End: 2023-06-06
Payer: MEDICARE

## 2023-06-06 VITALS
RESPIRATION RATE: 18 BRPM | HEART RATE: 76 BPM | WEIGHT: 260.25 LBS | BODY MASS INDEX: 35.25 KG/M2 | TEMPERATURE: 97 F | HEIGHT: 72 IN | OXYGEN SATURATION: 95 % | DIASTOLIC BLOOD PRESSURE: 76 MMHG | SYSTOLIC BLOOD PRESSURE: 116 MMHG

## 2023-06-06 DIAGNOSIS — K21.9 GASTROESOPHAGEAL REFLUX DISEASE WITHOUT ESOPHAGITIS: ICD-10-CM

## 2023-06-06 DIAGNOSIS — J45.20 MILD INTERMITTENT ASTHMA WITHOUT COMPLICATION: ICD-10-CM

## 2023-06-06 DIAGNOSIS — M50.30 DDD (DEGENERATIVE DISC DISEASE), CERVICAL: ICD-10-CM

## 2023-06-06 DIAGNOSIS — I10 ESSENTIAL HYPERTENSION, BENIGN: Primary | ICD-10-CM

## 2023-06-06 DIAGNOSIS — E66.01 SEVERE OBESITY (BMI 35.0-39.9) WITH COMORBIDITY: ICD-10-CM

## 2023-06-06 DIAGNOSIS — M51.36 DDD (DEGENERATIVE DISC DISEASE), LUMBAR: ICD-10-CM

## 2023-06-06 DIAGNOSIS — E78.00 PURE HYPERCHOLESTEROLEMIA: ICD-10-CM

## 2023-06-06 DIAGNOSIS — I48.0 PAROXYSMAL ATRIAL FIBRILLATION: ICD-10-CM

## 2023-06-06 DIAGNOSIS — M54.32 SCIATICA, LEFT SIDE: ICD-10-CM

## 2023-06-06 PROCEDURE — 99214 OFFICE O/P EST MOD 30 MIN: CPT | Mod: PBBFAC,PN | Performed by: FAMILY MEDICINE

## 2023-06-06 PROCEDURE — 99999 PR PBB SHADOW E&M-EST. PATIENT-LVL IV: ICD-10-PCS | Mod: PBBFAC,,, | Performed by: FAMILY MEDICINE

## 2023-06-06 PROCEDURE — 99214 PR OFFICE/OUTPT VISIT, EST, LEVL IV, 30-39 MIN: ICD-10-PCS | Mod: S$PBB,,, | Performed by: FAMILY MEDICINE

## 2023-06-06 PROCEDURE — 99999 PR PBB SHADOW E&M-EST. PATIENT-LVL IV: CPT | Mod: PBBFAC,,, | Performed by: FAMILY MEDICINE

## 2023-06-06 PROCEDURE — 99214 OFFICE O/P EST MOD 30 MIN: CPT | Mod: S$PBB,,, | Performed by: FAMILY MEDICINE

## 2023-06-06 RX ORDER — TRAMADOL HYDROCHLORIDE 50 MG/1
50 TABLET ORAL
Qty: 90 TABLET | Refills: 0 | Status: SHIPPED | OUTPATIENT
Start: 2023-06-06 | End: 2023-10-11 | Stop reason: SDUPTHER

## 2023-06-06 NOTE — PROGRESS NOTES
Subjective:       Patient ID: Sveta العراقي is a 73 y.o. female.    Chief Complaint: Follow-up (6 week )    HPI: 71 yo WF in for 6 week follow up patient was seen emergency room [id number] for chest pain/pericarditis/increased troponin--lab was reviewed magnesium slight decrease 1.4 BNP normal glucose 120 lipase normal troponin 1067 and 1172--had CT angiogram showing minimal obstruction of the LAD otherwise normal had CTA to rule out pulmonary embolus which was negative---patient was in the hospital overnight--took an NSAID and pain gradually subsided  Repeat lab 05/12/2023 CBCs CMP lipids hemoglobin A1c all basically normal with glucose 94 hemoglobin A1c 5.9 otherwise normal  Eating well--+BM--ambulating well  History pulmonary embolus 1999  History of asthma patient has albuterol and Symbicort and nebulizer machine  History hypertension on metoprolol  History hyperlipidemia on atorvastatin  History osteoporosis on Fosamax  History of depression on sertraline and trazodone   History low back pain on tramadol and Flexeril  History of atrial fib on Xarelto    ROS    Skin: no psoriasis, eczema, skin cancer  HEENT: No headache, ocular pain, blurred vision, diplopia, epistaxis, hoarseness change in voice, thyroid trouble  Lung: No pneumonia, +asthma,  No Tb, wheezing, SOB, no smoking  Heart: No chest pain, ankle edema, palpitations, MI, josselin murmur,+ hypertension,+ hyperlipidemia--atrial fib on Xarelto--no stent or bypass  Abdomen:  +GERD -doing well No nausea, vomiting, diarrhea, constipation, ulcers, hepatitis, gallbladder disease, melena, hematochezia, hematemesis  : no UTI, renal disease, stones  GYN hysterectomy  MS: no fractures, O/A, lupus, rheumatoid, gout--history of fractured right hand fractured right finger--history DDD lumbar DDD cervical spine with radiculopathy spondylosis facet arthropathy of the lumbar spine  Neuro: No dizziness, LOC, seizures CVA with right hemiparesis and TIA--history of a  tremor in the left leg prior to procedure  No diabetes, no anemia, no anxiety, no depression history posttraumatic stress from childhood on Medicare   3 children work disabled now on social security lives with son daughter-in-law in 2 grandchild    Objective:   Physical Exam:  General: Well nourished, well developed, no acute distress +obese  Skin: No lesions  HEENT: Eyes PERRLA, EOM intact, nose patent, throat non-erythematous   NECK: Supple, no bruits, No JVD, no nodes  Lungs: --coarse BS no rales no rhonchi no wheezing at this time   Heart: Regular rate and rhythm, no murmurs, gallops, or rubs  Abdomen: flat, bowel sounds positive, no tenderness, or organomegaly  MS:  not rechecked this visit Tenderness in lumbar spine L1-S1 bilaterally left greater than right--patient difficulty getting onto the exam table--1 walks has to walk stiff leg and with left leg states feels like a sponge worried it may give out--good flexion extension inversion eversion of the foot good flexion extension lower leg opposition able squat snf down able to arise  Neuro: Alert, CN intact, oriented X 3  Extremities: No cyanosis, clubbing, or edema         Assessment:       1. Essential hypertension, benign    2. DDD (degenerative disc disease), lumbar    3. Pure hypercholesterolemia    4. Paroxysmal atrial fibrillation    5. Mild intermittent asthma without complication    6. DDD (degenerative disc disease), cervical    7. Severe obesity (BMI 35.0-39.9) with comorbidity    8. Sciatica, left side    9. Gastroesophageal reflux disease without esophagitis          Plan:       Essential hypertension, benign    DDD (degenerative disc disease), lumbar  -     traMADoL (ULTRAM) 50 mg tablet; Take 1 tablet (50 mg total) by mouth every 24 hours as needed for Pain.  Dispense: 90 tablet; Refill: 0    Pure hypercholesterolemia    Paroxysmal atrial fibrillation    Mild intermittent asthma without complication    DDD (degenerative disc disease),  cervical    Severe obesity (BMI 35.0-39.9) with comorbidity    Sciatica, left side    Gastroesophageal reflux disease without esophagitis          Main Reason for Visit  Needs refills tramadol --uses as needed --and uses flexeril if no relief tramadol was given 90 but I will not refill it Needs follow up with DR Colin   Recent hospital admit 5-4-2023  History atrial fibrillation had ablation still feels funniness--in 6 mo suppose wear monitar again recheck it   History of pulmonary embolus 1999  History chronic back pain--multiple epidural steroid injections and affected--had lumbar rhizotomy--subsequently developed left sciatica with pain tingling and numbness--unable to see neurosurgeon at pain clinic until August--went to emergency room unable to get pain medication due to pain contract with Dr. Colin--pt does not want therapy anymore gone several times no relief   Other medical issues asthma hypertension hyperlipidemia atrial fibrillation GERD CVA with right hemiparesis history tremor left foot prior to rhizotomy /posttraumatic stress disorder--see HPI  Appointment see Dr. Colin at next available appoint  Lab in Nov CBCs CMP lipid TSH chest x-ray EKG6 min walk for O2

## 2023-07-24 DIAGNOSIS — F51.01 PRIMARY INSOMNIA: ICD-10-CM

## 2023-07-25 RX ORDER — TRAZODONE HYDROCHLORIDE 100 MG/1
TABLET ORAL
Qty: 90 TABLET | Refills: 0 | Status: SHIPPED | OUTPATIENT
Start: 2023-07-25 | End: 2023-10-20

## 2023-07-28 RX ORDER — CYCLOBENZAPRINE HCL 10 MG
TABLET ORAL
Qty: 90 TABLET | Refills: 1 | Status: SHIPPED | OUTPATIENT
Start: 2023-07-28

## 2023-07-28 NOTE — TELEPHONE ENCOUNTER
No care due was identified.  [facility] Embedded Care Due Messages. Reference number: [id number].   7/28/2023 9:21:51 AM CDT

## 2023-07-31 PROBLEM — A41.2 STAPHYLOCOCCUS SEPSIS: Status: RESOLVED | Noted: 2023-04-25 | Resolved: 2023-07-31

## 2023-09-05 ENCOUNTER — OFFICE VISIT (OUTPATIENT)
Dept: PRIMARY CARE CLINIC | Facility: CLINIC | Age: 73
End: 2023-09-05
Payer: MEDICARE

## 2023-09-05 VITALS
RESPIRATION RATE: 17 BRPM | DIASTOLIC BLOOD PRESSURE: 62 MMHG | OXYGEN SATURATION: 96 % | HEART RATE: 76 BPM | WEIGHT: 253.06 LBS | SYSTOLIC BLOOD PRESSURE: 123 MMHG | HEIGHT: 72 IN | TEMPERATURE: 96 F | BODY MASS INDEX: 34.28 KG/M2

## 2023-09-05 DIAGNOSIS — R10.13 EPIGASTRIC PAIN: Primary | ICD-10-CM

## 2023-09-05 DIAGNOSIS — J45.20 MILD INTERMITTENT ASTHMA WITHOUT COMPLICATION: ICD-10-CM

## 2023-09-05 PROCEDURE — 99214 OFFICE O/P EST MOD 30 MIN: CPT | Mod: PBBFAC,PN | Performed by: STUDENT IN AN ORGANIZED HEALTH CARE EDUCATION/TRAINING PROGRAM

## 2023-09-05 PROCEDURE — 99999 PR PBB SHADOW E&M-EST. PATIENT-LVL IV: CPT | Mod: PBBFAC,,, | Performed by: STUDENT IN AN ORGANIZED HEALTH CARE EDUCATION/TRAINING PROGRAM

## 2023-09-05 PROCEDURE — 99214 PR OFFICE/OUTPT VISIT, EST, LEVL IV, 30-39 MIN: ICD-10-PCS | Mod: S$PBB,,, | Performed by: STUDENT IN AN ORGANIZED HEALTH CARE EDUCATION/TRAINING PROGRAM

## 2023-09-05 PROCEDURE — 99214 OFFICE O/P EST MOD 30 MIN: CPT | Mod: S$PBB,,, | Performed by: STUDENT IN AN ORGANIZED HEALTH CARE EDUCATION/TRAINING PROGRAM

## 2023-09-05 PROCEDURE — 99999 PR PBB SHADOW E&M-EST. PATIENT-LVL IV: ICD-10-PCS | Mod: PBBFAC,,, | Performed by: STUDENT IN AN ORGANIZED HEALTH CARE EDUCATION/TRAINING PROGRAM

## 2023-09-05 RX ORDER — PANTOPRAZOLE SODIUM 40 MG/1
40 TABLET, DELAYED RELEASE ORAL
COMMUNITY
Start: 2023-07-28

## 2023-09-05 RX ORDER — CIPROFLOXACIN 500 MG/1
500 TABLET ORAL 2 TIMES DAILY
Qty: 6 TABLET | Refills: 0 | Status: SHIPPED | OUTPATIENT
Start: 2023-09-05 | End: 2023-09-07

## 2023-09-05 RX ORDER — LISINOPRIL 10 MG/1
10 TABLET ORAL
COMMUNITY
Start: 2023-08-30

## 2023-09-05 RX ORDER — MONTELUKAST SODIUM 10 MG/1
10 TABLET ORAL NIGHTLY
Qty: 90 TABLET | Refills: 2 | Status: SHIPPED | OUTPATIENT
Start: 2023-09-05 | End: 2023-09-07

## 2023-09-05 RX ORDER — SUCRALFATE 1 G/1
1 TABLET ORAL 4 TIMES DAILY
Qty: 120 TABLET | Refills: 0 | Status: SHIPPED | OUTPATIENT
Start: 2023-09-05 | End: 2023-09-07

## 2023-09-05 RX ORDER — BUDESONIDE AND FORMOTEROL FUMARATE DIHYDRATE 80; 4.5 UG/1; UG/1
2 AEROSOL RESPIRATORY (INHALATION) 2 TIMES DAILY
Qty: 10.2 G | Refills: 0 | Status: SHIPPED | OUTPATIENT
Start: 2023-09-05 | End: 2023-09-07

## 2023-09-05 RX ORDER — DICYCLOMINE HYDROCHLORIDE 10 MG/1
10 CAPSULE ORAL
Qty: 120 CAPSULE | Refills: 0 | Status: SHIPPED | OUTPATIENT
Start: 2023-09-05 | End: 2023-09-07

## 2023-09-05 NOTE — PROGRESS NOTES
Subjective:       Patient ID: Sveta العراقي is a 73 y.o. female.    Chief Complaint: Nausea (Pt states that she has been having nausea for 3 days now ) and Abdominal Pain (Pt states that she has had some discomfort two days ago and now she is in a lot of pain )    HPI:  73 y.o. female presents to [facility] with complaints of nausea and abdominal pain for approximately 3 days now    Patient reports about 3 days ago began with nausea and feeling like something wants to come up. Pain was initially mild, felt like a knot/mass under skin when palpating. Worsening pain since onset. Pain is described as sharp. When riding in car bothers area. Upper right abdomen is reported as site of pain. Tried Tramadol which didn't help to relieve the pain without improvement in symptoms. Limited to upper mid right abdomen. No longer has gallbladder, but did have recurrent stones after. Bile duct was moved to small intestines. Had to relocate twice. Hasn't felt a stone since 2000. This feels different from stone in the past.    Raw foods?: No  Eating out?: No  Leftovers?: No  Recent travel?: No  New Medications?:  No  Others sick?: None  No change with meal intake    Review of Systems   Constitutional:  Negative for chills, diaphoresis, fatigue and fever.   HENT:  Negative for congestion, sinus pressure, sneezing and sore throat.    Respiratory:  Negative for cough and shortness of breath.    Cardiovascular:  Negative for chest pain and palpitations.   Gastrointestinal:  Positive for abdominal pain and nausea. Negative for constipation, diarrhea and vomiting (None).        Last BM 9/4/2023 and normal consistency   Skin:  Negative for rash and wound.   Neurological:  Positive for weakness (Chronic right sided weakness). Negative for numbness.       Objective:      Vitals:    09/05/23 1508   BP: 123/62   BP Location: Right arm   Patient Position: Sitting   BP Method: Large (Manual)   Pulse: 76   Resp: 17   Temp: (!) 95.8 °F (35.4  °C)   TempSrc: Temporal   SpO2: 96%   Weight: 114.8 kg (253 lb 1.4 oz)   Height: 6' (1.829 m)     Physical Exam  Vitals reviewed.   Constitutional:       General: She is not in acute distress.     Appearance: Normal appearance. She is not ill-appearing.   HENT:      Head: Normocephalic and atraumatic.   Eyes:      General:         Right eye: No discharge.         Left eye: No discharge.      Conjunctiva/sclera: Conjunctivae normal.   Cardiovascular:      Rate and Rhythm: Normal rate.   Pulmonary:      Effort: Pulmonary effort is normal.   Abdominal:      General: Abdomen is flat. Bowel sounds are normal. There is no distension.      Palpations: Abdomen is soft. There is no mass.      Tenderness: There is abdominal tenderness in the epigastric area. There is guarding (Voluntary, with epigastric palpation). There is no rebound.   Musculoskeletal:         General: No deformity.   Skin:     General: Skin is warm and dry.      Coloration: Skin is not jaundiced.   Neurological:      General: No focal deficit present.      Mental Status: She is alert and oriented to person, place, and time.   Psychiatric:         Mood and Affect: Mood normal.         Behavior: Behavior normal.             Lab Results   Component Value Date     05/12/2023    K 4.2 05/12/2023     05/12/2023    CO2 23 05/12/2023    BUN 17 05/12/2023    CREATININE 1.1 05/12/2023    GLUCOSE 99 05/05/2023    ANIONGAP 8 05/12/2023     Lab Results   Component Value Date    HGBA1C 5.9 (H) 05/05/2023    HGBA1C 5.1 12/11/2017     Lab Results   Component Value Date    BNP 76 01/10/2020     (H) 04/27/2019    BNP 84 02/22/2019       Lab Results   Component Value Date    WBC 6.49 05/12/2023    HGB 12.2 05/12/2023    HCT 36.5 (L) 05/12/2023     05/12/2023    GRAN 3.8 05/12/2023    GRAN 58.0 05/12/2023     Lab Results   Component Value Date    CHOL 141 05/12/2023    HDL 51 05/12/2023    LDLCALC 74.2 05/12/2023    TRIG 79 05/12/2023           Current Outpatient Medications:     albuterol (PROVENTIL) 2.5 mg /3 mL (0.083 %) nebulizer solution, Take 3 mLs (2.5 mg total) by nebulization every 4 (four) hours as needed for Wheezing or Shortness of Breath. Rescue, Disp: 150 mL, Rfl: 3    albuterol (PROVENTIL/VENTOLIN HFA) 90 mcg/actuation inhaler, Inhale 2 puffs into the lungs every 4 (four) hours as needed for Wheezing or Shortness of Breath. Rescue, Disp: 8 g, Rfl: 2    alendronate (FOSAMAX) 35 MG tablet, TAKE 1 TABLET EVERY 7 DAYS, Disp: 12 tablet, Rfl: 1    atorvastatin (LIPITOR) 20 MG tablet, Take 1 tablet by mouth once daily., Disp: , Rfl:     calcium carbonate-vit D3-min 600 mg calcium- 400 unit Tab, Take 1 tablet by mouth 2 (two) times daily., Disp: 180 tablet, Rfl: 3    cyclobenzaprine (FLEXERIL) 10 MG tablet, TAKE 1 TABLET DAILY AS NEEDED FOR MUSCLE SPASMS, Disp: 90 tablet, Rfl: 1    lidocaine-prilocaine (EMLA) cream, Apply topically 3 (three) times daily as needed., Disp: , Rfl:     lisinopriL 10 MG tablet, Take 10 mg by mouth., Disp: , Rfl:     metoprolol succinate (TOPROL-XL) 100 MG 24 hr tablet, Take 100 mg by mouth once daily., Disp: , Rfl:     pantoprazole (PROTONIX) 40 MG tablet, Take 40 mg by mouth., Disp: , Rfl:     potassium chloride (KLOR-CON) 8 MEQ TbSR, Take 1 tablet (8 mEq total) by mouth once daily., Disp: 90 tablet, Rfl: 3    primidone (MYSOLINE) 50 MG Tab, Take 50 mg by mouth every evening., Disp: , Rfl:     promethazine (PHENERGAN) 25 MG tablet, TAKE 1 TABLET EVERY 6 HOURS AS NEEDED FOR NAUSEA, Disp: 60 tablet, Rfl: 3    sertraline (ZOLOFT) 100 MG tablet, TAKE 1 TABLET DAILY, Disp: 90 tablet, Rfl: 1    traMADoL (ULTRAM) 50 mg tablet, Take 1 tablet (50 mg total) by mouth every 24 hours as needed for Pain., Disp: 90 tablet, Rfl: 0    traZODone (DESYREL) 100 MG tablet, TAKE 1 TABLET DAILY AT BEDTIME, Disp: 90 tablet, Rfl: 0    XARELTO 15 mg Tab, Take 1 tablet (15 mg total) by mouth once daily., Disp: 90 tablet, Rfl: 3     budesonide-formoterol 80-4.5 mcg (SYMBICORT) 80-4.5 mcg/actuation HFAA, Inhale 2 puffs into the lungs 2 (two) times a day. Controller, Disp: 10.2 g, Rfl: 0    ciprofloxacin HCl (CIPRO) 500 MG tablet, Take 1 tablet (500 mg total) by mouth 2 (two) times daily. for 3 days, Disp: 6 tablet, Rfl: 0    diazePAM (VALIUM) 5 MG tablet, Take 5 mg by mouth daily as needed., Disp: , Rfl:     dicyclomine (BENTYL) 10 MG capsule, Take 1 capsule (10 mg total) by mouth 4 (four) times daily before meals and nightly., Disp: 120 capsule, Rfl: 0    montelukast (SINGULAIR) 10 mg tablet, Take 1 tablet (10 mg total) by mouth every evening., Disp: 90 tablet, Rfl: 2    sucralfate (CARAFATE) 1 gram tablet, Take 1 tablet (1 g total) by mouth 4 (four) times daily., Disp: 120 tablet, Rfl: 0        Assessment:       1. Epigastric pain    2. Mild intermittent asthma without complication           Plan:       Epigastric pain  -     ciprofloxacin HCl (CIPRO) 500 MG tablet; Take 1 tablet (500 mg total) by mouth 2 (two) times daily. for 3 days  Dispense: 6 tablet; Refill: 0  -     CBC W/ AUTO DIFFERENTIAL; Future; Expected date: 09/05/2023  -     Comprehensive Metabolic Panel; Future; Expected date: 09/05/2023  -     LIPASE; Future; Expected date: 09/05/2023  -     Urinalysis; Future; Expected date: 09/05/2023  -     Sedimentation rate; Future; Expected date: 09/05/2023  -     C-REACTIVE PROTEIN; Future; Expected date: 09/05/2023  -     sucralfate (CARAFATE) 1 gram tablet; Take 1 tablet (1 g total) by mouth 4 (four) times daily.  Dispense: 120 tablet; Refill: 0  -     dicyclomine (BENTYL) 10 MG capsule; Take 1 capsule (10 mg total) by mouth 4 (four) times daily before meals and nightly.  Dispense: 120 capsule; Refill: 0  - Will assess blood work today, abdominal x-ray ordered  - Instructed if dizziness, weakness, tachycardia, fevers, intractible pain, or other immediate concerns develop, present to Emergency Department immediately    Mild intermittent  asthma without complication  -     budesonide-formoterol 80-4.5 mcg (SYMBICORT) 80-4.5 mcg/actuation HFAA; Inhale 2 puffs into the lungs 2 (two) times a day. Controller  Dispense: 10.2 g; Refill: 0  -     montelukast (SINGULAIR) 10 mg tablet; Take 1 tablet (10 mg total) by mouth every evening.  Dispense: 90 tablet; Refill: 2    RTC in 4 weeks

## 2023-09-06 ENCOUNTER — TELEPHONE (OUTPATIENT)
Dept: PRIMARY CARE CLINIC | Facility: CLINIC | Age: 73
End: 2023-09-06

## 2023-09-06 NOTE — TELEPHONE ENCOUNTER
Patient needs her prescriptions from her visit yesterday to be sent to [facility] in [place]

## 2023-09-06 NOTE — TELEPHONE ENCOUNTER
----- Message from Haris Horn sent at 9/6/2023  2:17 PM CDT -----  Contact: [phone] @ patient  Good afternoon patient would like a call back med the doc was supposed to call in and the pharmacy still didn't received it. Please give patient a call back [phone]

## 2023-09-07 RX ORDER — BUDESONIDE AND FORMOTEROL FUMARATE DIHYDRATE 80; 4.5 UG/1; UG/1
2 AEROSOL RESPIRATORY (INHALATION) 2 TIMES DAILY
Qty: 10.2 G | Refills: 0 | Status: SHIPPED | OUTPATIENT
Start: 2023-09-07 | End: 2023-09-07

## 2023-09-07 RX ORDER — BUDESONIDE AND FORMOTEROL FUMARATE DIHYDRATE 80; 4.5 UG/1; UG/1
2 AEROSOL RESPIRATORY (INHALATION) 2 TIMES DAILY
Qty: 10.2 G | Refills: 0 | Status: SHIPPED | OUTPATIENT
Start: 2023-09-07 | End: 2024-09-06

## 2023-09-07 RX ORDER — CIPROFLOXACIN 500 MG/1
500 TABLET ORAL 2 TIMES DAILY
Qty: 6 TABLET | Refills: 0 | Status: SHIPPED | OUTPATIENT
Start: 2023-09-07 | End: 2023-09-10

## 2023-09-07 RX ORDER — SUCRALFATE 1 G/1
1 TABLET ORAL 4 TIMES DAILY
Qty: 120 TABLET | Refills: 0 | Status: SHIPPED | OUTPATIENT
Start: 2023-09-07

## 2023-09-07 RX ORDER — DICYCLOMINE HYDROCHLORIDE 10 MG/1
10 CAPSULE ORAL
Qty: 120 CAPSULE | Refills: 0 | Status: SHIPPED | OUTPATIENT
Start: 2023-09-07 | End: 2023-10-07

## 2023-09-07 RX ORDER — MONTELUKAST SODIUM 10 MG/1
10 TABLET ORAL NIGHTLY
Qty: 90 TABLET | Refills: 2 | Status: SHIPPED | OUTPATIENT
Start: 2023-09-07 | End: 2024-01-23 | Stop reason: SDUPTHER

## 2023-09-18 ENCOUNTER — PATIENT MESSAGE (OUTPATIENT)
Dept: PRIMARY CARE CLINIC | Facility: CLINIC | Age: 73
End: 2023-09-18
Payer: MEDICARE

## 2023-10-04 ENCOUNTER — PATIENT MESSAGE (OUTPATIENT)
Dept: PRIMARY CARE CLINIC | Facility: CLINIC | Age: 73
End: 2023-10-04
Payer: MEDICARE

## 2023-10-11 ENCOUNTER — OFFICE VISIT (OUTPATIENT)
Dept: PRIMARY CARE CLINIC | Facility: CLINIC | Age: 73
End: 2023-10-11
Payer: MEDICARE

## 2023-10-11 DIAGNOSIS — M51.36 DDD (DEGENERATIVE DISC DISEASE), LUMBAR: Primary | ICD-10-CM

## 2023-10-11 PROCEDURE — 99213 OFFICE O/P EST LOW 20 MIN: CPT | Mod: 95,,, | Performed by: FAMILY MEDICINE

## 2023-10-11 PROCEDURE — 99213 PR OFFICE/OUTPT VISIT, EST, LEVL III, 20-29 MIN: ICD-10-PCS | Mod: 95,,, | Performed by: FAMILY MEDICINE

## 2023-10-11 RX ORDER — TRAMADOL HYDROCHLORIDE 50 MG/1
50 TABLET ORAL
Qty: 90 TABLET | Refills: 1 | Status: SHIPPED | OUTPATIENT
Start: 2023-10-11

## 2023-10-11 NOTE — PROGRESS NOTES
Subjective:       Patient ID: Sveta العراقي is a 73 y.o. female.    Chief Complaint: No chief complaint on file.      Chronic lower back pain, has been taking tramadol roughly every other day.  Controlled substance agreement in place.   reviewed, no worrisome findings.  Says she fell going up some steps about 2 weeks ago, but recovering from that.  No major injury.  No constipation.  No dizziness or lightheaded.  Happy to report that she has been slowly losing weight via lifestyle modification      The patient location is:  [place]  The chief complaint leading to consultation is:  Med refill    Visit type: audiovisual    Face to Face time with patient:  7 minutes  12 minutes of total time spent on the encounter, which includes face to face time and non-face to face time preparing to see the patient (eg, review of tests), Obtaining and/or reviewing separately obtained history, Documenting clinical information in the electronic or other health record, Independently interpreting results (not separately reported) and communicating results to the patient/family/caregiver, or Care coordination (not separately reported).         Each patient to whom he or she provides medical services by telemedicine is:  (1) informed of the relationship between the physician and patient and the respective role of any other health care provider with respect to management of the patient; and (2) notified that he or she may decline to receive medical services by telemedicine and may withdraw from such care at any time.    Notes:    Review of Systems   Gastrointestinal:  Negative for constipation.   Musculoskeletal:  Positive for arthralgias and back pain.   Psychiatric/Behavioral:  Negative for agitation and confusion.        Objective:      There were no vitals filed for this visit.  Physical Exam  Constitutional:       General: She is not in acute distress.     Appearance: Normal appearance. She is well-developed.   Pulmonary:       Effort: No respiratory distress.   Neurological:      Mental Status: She is alert and oriented to person, place, and time.   Psychiatric:         Behavior: Behavior normal.         Lab Results   Component Value Date    WBC 7.26 10/07/2023    HGB 12.3 10/07/2023    HCT 36.8 (L) 10/07/2023     10/07/2023    CHOL 141 05/12/2023    TRIG 79 05/12/2023    HDL 51 05/12/2023    ALT 11 10/07/2023    AST 17 10/07/2023     10/07/2023    K 4.2 10/07/2023     10/07/2023    CREATININE 1.2 10/07/2023    BUN 23 10/07/2023    CO2 24 10/07/2023    TSH 3.300 05/12/2023    INR 2.3 (H) 03/30/2023    HGBA1C 5.9 (H) 05/05/2023      Assessment:       1. DDD (degenerative disc disease), lumbar        Plan:       DDD (degenerative disc disease), lumbar  -     traMADoL (ULTRAM) 50 mg tablet; Take 1 tablet (50 mg total) by mouth every 24 hours as needed for Pain.  Dispense: 90 tablet; Refill: 1      Medication List with Changes/Refills   Current Medications    ALBUTEROL (PROVENTIL) 2.5 MG /3 ML (0.083 %) NEBULIZER SOLUTION    Take 3 mLs (2.5 mg total) by nebulization every 4 (four) hours as needed for Wheezing or Shortness of Breath. Rescue    ALBUTEROL (PROVENTIL/VENTOLIN HFA) 90 MCG/ACTUATION INHALER    Inhale 2 puffs into the lungs every 4 (four) hours as needed for Wheezing or Shortness of Breath. Rescue    ALENDRONATE (FOSAMAX) 35 MG TABLET    TAKE 1 TABLET EVERY 7 DAYS    ATORVASTATIN (LIPITOR) 20 MG TABLET    Take 1 tablet by mouth once daily.    BUDESONIDE-FORMOTEROL 80-4.5 MCG (SYMBICORT) 80-4.5 MCG/ACTUATION HFAA    Inhale 2 puffs into the lungs 2 (two) times a day. Controller    CALCIUM CARBONATE-VIT D3- MG CALCIUM- 400 UNIT TAB    Take 1 tablet by mouth 2 (two) times daily.    CYCLOBENZAPRINE (FLEXERIL) 10 MG TABLET    TAKE 1 TABLET DAILY AS NEEDED FOR MUSCLE SPASMS    DIAZEPAM (VALIUM) 5 MG TABLET    Take 5 mg by mouth daily as needed.    FAMOTIDINE (PEPCID) 20 MG TABLET    Take 1 tablet (20 mg total)  by mouth 2 (two) times daily.    LIDOCAINE-PRILOCAINE (EMLA) CREAM    Apply topically 3 (three) times daily as needed.    LISINOPRIL 10 MG TABLET    Take 10 mg by mouth.    METOPROLOL SUCCINATE (TOPROL-XL) 100 MG 24 HR TABLET    Take 100 mg by mouth once daily.    MONTELUKAST (SINGULAIR) 10 MG TABLET    Take 1 tablet (10 mg total) by mouth every evening.    PANTOPRAZOLE (PROTONIX) 40 MG TABLET    Take 40 mg by mouth.    POTASSIUM CHLORIDE (KLOR-CON) 8 MEQ TBSR    Take 1 tablet (8 mEq total) by mouth once daily.    PRIMIDONE (MYSOLINE) 50 MG TAB    Take 50 mg by mouth every evening.    PROMETHAZINE (PHENERGAN) 25 MG TABLET    TAKE 1 TABLET EVERY 6 HOURS AS NEEDED FOR NAUSEA    SERTRALINE (ZOLOFT) 100 MG TABLET    TAKE 1 TABLET DAILY    SUCRALFATE (CARAFATE) 1 GRAM TABLET    Take 1 tablet (1 g total) by mouth 4 (four) times daily.    TRAZODONE (DESYREL) 100 MG TABLET    TAKE 1 TABLET DAILY AT BEDTIME    XARELTO 15 MG TAB    Take 1 tablet (15 mg total) by mouth once daily.   Changed and/or Refilled Medications    Modified Medication Previous Medication    TRAMADOL (ULTRAM) 50 MG TABLET traMADoL (ULTRAM) 50 mg tablet       Take 1 tablet (50 mg total) by mouth every 24 hours as needed for Pain.    Take 1 tablet (50 mg total) by mouth every 24 hours as needed for Pain.

## 2023-10-18 ENCOUNTER — PATIENT MESSAGE (OUTPATIENT)
Dept: CARDIOLOGY | Facility: CLINIC | Age: 73
End: 2023-10-18
Payer: MEDICARE

## 2023-10-20 DIAGNOSIS — F51.01 PRIMARY INSOMNIA: ICD-10-CM

## 2023-10-20 RX ORDER — RIVAROXABAN 15 MG/1
15 TABLET, FILM COATED ORAL
Qty: 90 TABLET | Refills: 3 | Status: SHIPPED | OUTPATIENT
Start: 2023-10-20

## 2023-10-20 RX ORDER — TRAZODONE HYDROCHLORIDE 100 MG/1
TABLET ORAL
Qty: 90 TABLET | Refills: 3 | Status: SHIPPED | OUTPATIENT
Start: 2023-10-20

## 2023-10-20 RX ORDER — SERTRALINE HYDROCHLORIDE 100 MG/1
TABLET, FILM COATED ORAL
Qty: 90 TABLET | Refills: 3 | Status: SHIPPED | OUTPATIENT
Start: 2023-10-20

## 2023-10-20 RX ORDER — POTASSIUM CHLORIDE 600 MG/1
8 TABLET, FILM COATED, EXTENDED RELEASE ORAL
Qty: 90 TABLET | Refills: 3 | Status: SHIPPED | OUTPATIENT
Start: 2023-10-20

## 2023-10-20 NOTE — TELEPHONE ENCOUNTER
No care due was identified.  [facility] Embedded Care Due Messages. Reference number: [id number].   10/20/2023 2:28:10 AM CDT

## 2023-10-20 NOTE — TELEPHONE ENCOUNTER
Refill Routing Note   Medication(s) are not appropriate for processing by [facility] for the following reason(s):      Medication outside of protocol    [facility] action(s):  Route  Approve Care Due:  None identified            Appointments  past 12m or future 3m with PCP    Date Provider   Last Visit   10/11/2023 Kash Colin MD   Next Visit   Visit date not found Kash Colin MD   ED visits in past 90 days: 1        Note composed:8:01 AM 10/20/2023

## 2023-11-08 DIAGNOSIS — M85.80 OSTEOPENIA, UNSPECIFIED LOCATION: ICD-10-CM

## 2023-11-10 RX ORDER — ALENDRONATE SODIUM 35 MG/1
TABLET ORAL
Qty: 12 TABLET | Refills: 3 | Status: SHIPPED | OUTPATIENT
Start: 2023-11-10

## 2023-11-14 ENCOUNTER — PATIENT OUTREACH (OUTPATIENT)
Dept: EMERGENCY MEDICINE | Facility: HOSPITAL | Age: 73
End: 2023-11-14

## 2024-01-03 DIAGNOSIS — Z71.89 COMPLEX CARE COORDINATION: ICD-10-CM

## 2024-01-23 DIAGNOSIS — J45.20 MILD INTERMITTENT ASTHMA WITHOUT COMPLICATION: ICD-10-CM

## 2024-01-23 RX ORDER — MONTELUKAST SODIUM 10 MG/1
10 TABLET ORAL NIGHTLY
Qty: 90 TABLET | Refills: 3 | Status: SHIPPED | OUTPATIENT
Start: 2024-01-23

## 2024-04-09 ENCOUNTER — OFFICE VISIT (OUTPATIENT)
Dept: PRIMARY CARE CLINIC | Facility: CLINIC | Age: 74
End: 2024-04-09
Payer: MEDICARE

## 2024-04-09 VITALS
HEART RATE: 66 BPM | SYSTOLIC BLOOD PRESSURE: 118 MMHG | HEIGHT: 72 IN | OXYGEN SATURATION: 96 % | DIASTOLIC BLOOD PRESSURE: 78 MMHG | RESPIRATION RATE: 16 BRPM | WEIGHT: 248.81 LBS | TEMPERATURE: 98 F | BODY MASS INDEX: 33.7 KG/M2

## 2024-04-09 DIAGNOSIS — E78.5 HYPERLIPIDEMIA, UNSPECIFIED HYPERLIPIDEMIA TYPE: ICD-10-CM

## 2024-04-09 DIAGNOSIS — N18.32 CHRONIC RENAL IMPAIRMENT, STAGE 3B: ICD-10-CM

## 2024-04-09 DIAGNOSIS — G89.29 CHRONIC LEFT SHOULDER PAIN: Primary | ICD-10-CM

## 2024-04-09 DIAGNOSIS — I69.851 HEMIPLEGIA AND HEMIPARESIS FOLLOWING OTHER CEREBROVASCULAR DISEASE AFFECTING RIGHT DOMINANT SIDE: ICD-10-CM

## 2024-04-09 DIAGNOSIS — M51.36 DDD (DEGENERATIVE DISC DISEASE), LUMBAR: ICD-10-CM

## 2024-04-09 DIAGNOSIS — J45.20 MILD INTERMITTENT ASTHMA WITHOUT COMPLICATION: ICD-10-CM

## 2024-04-09 DIAGNOSIS — Z12.31 BREAST CANCER SCREENING BY MAMMOGRAM: ICD-10-CM

## 2024-04-09 DIAGNOSIS — I70.0 AORTIC ATHEROSCLEROSIS: ICD-10-CM

## 2024-04-09 DIAGNOSIS — M25.512 CHRONIC LEFT SHOULDER PAIN: Primary | ICD-10-CM

## 2024-04-09 DIAGNOSIS — I48.0 PAROXYSMAL ATRIAL FIBRILLATION: ICD-10-CM

## 2024-04-09 DIAGNOSIS — R73.9 ELEVATED SERUM GLUCOSE: ICD-10-CM

## 2024-04-09 PROBLEM — E66.01 SEVERE OBESITY (BMI 35.0-39.9) WITH COMORBIDITY: Status: RESOLVED | Noted: 2021-05-31 | Resolved: 2024-04-09

## 2024-04-09 PROCEDURE — 99214 OFFICE O/P EST MOD 30 MIN: CPT | Mod: S$PBB,,, | Performed by: FAMILY MEDICINE

## 2024-04-09 PROCEDURE — 99999 PR PBB SHADOW E&M-EST. PATIENT-LVL IV: CPT | Mod: PBBFAC,,, | Performed by: FAMILY MEDICINE

## 2024-04-09 PROCEDURE — 99214 OFFICE O/P EST MOD 30 MIN: CPT | Mod: PBBFAC,PN | Performed by: FAMILY MEDICINE

## 2024-04-09 RX ORDER — ALBUTEROL SULFATE 90 UG/1
2 AEROSOL, METERED RESPIRATORY (INHALATION) EVERY 4 HOURS PRN
Qty: 8 G | Refills: 2 | Status: SHIPPED | OUTPATIENT
Start: 2024-04-09

## 2024-04-09 RX ORDER — TRAMADOL HYDROCHLORIDE 50 MG/1
50 TABLET ORAL
Qty: 90 TABLET | Refills: 1 | Status: SHIPPED | OUTPATIENT
Start: 2024-04-09

## 2024-04-09 RX ORDER — TRIAMCINOLONE ACETONIDE 1 MG/G
80 CREAM TOPICAL DAILY PRN
COMMUNITY
Start: 2024-01-31

## 2024-04-09 NOTE — PROGRESS NOTES
Subjective:       Patient ID: Sveta العراقي is a 73 y.o. female.    Chief Complaint: Follow-up (6 month f/u)    Has been having left shoulder pain for the past 7-8 months.  Saw an outside orthopedist last fall, got a cortisone injection which provided approximately 3-4 weeks of relief, but pain has recurred and has persisted.  Has a remote history of fall with injury, but nothing recent.  No chest pain.  Has been wheezing more than normal lately, requesting refill of albuterol    Follow-up  Associated symptoms include arthralgias and neck pain. Pertinent negatives include no chest pain or fever.     Review of Systems   Constitutional:  Negative for fever.   Respiratory:  Positive for wheezing.    Cardiovascular:  Negative for chest pain.   Musculoskeletal:  Positive for arthralgias, back pain and neck pain.   Neurological:  Negative for dizziness.   Psychiatric/Behavioral:  Negative for agitation and confusion.        Objective:      Vitals:    04/09/24 0813   BP: 118/78   BP Location: Right arm   Patient Position: Sitting   BP Method: Large (Manual)   Pulse: 66   Resp: 16   Temp: 97.8 °F (36.6 °C)   TempSrc: Temporal   SpO2: 96%   Weight: 112.9 kg (248 lb 12.6 oz)   Height: 6' (1.829 m)     BP Readings from Last 5 Encounters:   04/09/24 118/78   11/13/23 (!) 145/95   10/07/23 (!) 155/72   09/05/23 123/62   06/06/23 116/76     Wt Readings from Last 5 Encounters:   04/09/24 112.9 kg (248 lb 12.6 oz)   11/13/23 112.7 kg (248 lb 7.3 oz)   10/07/23 113.5 kg (250 lb 3.6 oz)   09/05/23 114.8 kg (253 lb 1.4 oz)   06/06/23 118.1 kg (260 lb 4.1 oz)     Physical Exam  Vitals and nursing note reviewed.   Constitutional:       General: She is not in acute distress.     Appearance: Normal appearance. She is well-developed.   HENT:      Head: Normocephalic and atraumatic.   Cardiovascular:      Rate and Rhythm: Normal rate and regular rhythm.      Heart sounds: Normal heart sounds.   Pulmonary:      Effort: Pulmonary effort  is normal.      Breath sounds: Normal breath sounds.   Musculoskeletal:      Left shoulder: Tenderness present. Decreased range of motion.      Right lower leg: No edema.      Left lower leg: No edema.   Skin:     General: Skin is warm and dry.   Neurological:      Mental Status: She is alert and oriented to person, place, and time.   Psychiatric:         Mood and Affect: Mood normal.         Behavior: Behavior normal.         Lab Results   Component Value Date    WBC 7.26 10/07/2023    HGB 12.3 10/07/2023    HCT 36.8 (L) 10/07/2023     10/07/2023    CHOL 141 05/12/2023    TRIG 79 05/12/2023    HDL 51 05/12/2023    ALT 11 10/07/2023    AST 17 10/07/2023     10/07/2023    K 4.2 10/07/2023     10/07/2023    CREATININE 1.2 10/07/2023    BUN 23 10/07/2023    CO2 24 10/07/2023    TSH 3.300 05/12/2023    INR 2.3 (H) 03/30/2023    HGBA1C 5.9 (H) 05/05/2023      Assessment:       1. Chronic left shoulder pain    2. Breast cancer screening by mammogram    3. Mild intermittent asthma without complication    4. Hyperlipidemia, unspecified hyperlipidemia type    5. Paroxysmal atrial fibrillation    6. Chronic renal impairment, stage 3b    7. 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 Aortic atherosclerosis    9. Elevated serum glucose    10. DDD (degenerative disc disease), lumbar        Plan:       Chronic left shoulder pain  -     X-Ray Shoulder 2 or More Views Left; Future; Expected date: 04/09/2024  -     Ambulatory referral/consult to Orthopedics; Future; Expected date: 04/16/2024    Breast cancer screening by mammogram  -     Mammo Digital Screening Bilat w/ Ramon; Future; Expected date: 05/09/2024    Mild intermittent asthma without complication  -     albuterol (PROVENTIL/VENTOLIN HFA) 90 mcg/actuation inhaler; Inhale 2 puffs into the lungs every 4 (four) hours as needed for Wheezing or Shortness of Breath. Rescue  Dispense: 8 g; Refill:  2    Hyperlipidemia, unspecified hyperlipidemia type  -     CBC Auto Differential; Future; Expected date: 04/09/2024  -     Comprehensive Metabolic Panel; Future; Expected date: 04/09/2024  -     Lipid Panel; Future; Expected date: 04/09/2024    Paroxysmal atrial fibrillation  -     TSH; Future; Expected date: 04/09/2024  Stable, followed by Cardiology at [facility]  Chronic renal impairment, stage 3b  Needs updated labs  Hemiplegia and hemiparesis following other cerebrovascular disease affecting right dominant side  Stable  Aortic atherosclerosis  Medical management  Elevated serum glucose  -     Hemoglobin A1C; Future; Expected date: 04/09/2024    DDD (degenerative disc disease), lumbar  -     traMADoL (ULTRAM) 50 mg tablet; Take 1 tablet (50 mg total) by mouth every 24 hours as needed for Pain.  Dispense: 90 tablet; Refill: 1   reviewed, no worrisome findings.  Controlled substance agreement in place    Medication List with Changes/Refills   Current Medications    ALBUTEROL (PROVENTIL) 2.5 MG /3 ML (0.083 %) NEBULIZER SOLUTION    Take 3 mLs (2.5 mg total) by nebulization every 4 (four) hours as needed for Wheezing or Shortness of Breath. Rescue    ALENDRONATE (FOSAMAX) 35 MG TABLET    TAKE 1 TABLET EVERY 7 DAYS    ATORVASTATIN (LIPITOR) 20 MG TABLET    Take 1 tablet by mouth once daily.    CALCIUM CARBONATE-VIT D3- MG CALCIUM- 400 UNIT TAB    Take 1 tablet by mouth 2 (two) times daily.    CYCLOBENZAPRINE (FLEXERIL) 10 MG TABLET    TAKE 1 TABLET DAILY AS NEEDED FOR MUSCLE SPASMS    KLOR-CON 8 8 MEQ TBSR    TAKE 1 TABLET DAILY    LIDOCAINE-PRILOCAINE (EMLA) CREAM    Apply topically 3 (three) times daily as needed.    LISINOPRIL 10 MG TABLET    Take 10 mg by mouth.    METOPROLOL SUCCINATE (TOPROL-XL) 100 MG 24 HR TABLET    Take 100 mg by mouth once daily.    MONTELUKAST (SINGULAIR) 10 MG TABLET    Take 1 tablet (10 mg total) by mouth every evening.    PANTOPRAZOLE (PROTONIX) 40 MG TABLET    Take 40 mg by  mouth.    PRIMIDONE (MYSOLINE) 50 MG TAB    Take 50 mg by mouth every evening.    PROMETHAZINE (PHENERGAN) 25 MG TABLET    TAKE 1 TABLET EVERY 6 HOURS AS NEEDED FOR NAUSEA    SERTRALINE (ZOLOFT) 100 MG TABLET    TAKE 1 TABLET DAILY    TRAZODONE (DESYREL) 100 MG TABLET    TAKE 1 TABLET DAILY AT BEDTIME    TRIAMCINOLONE ACETONIDE 0.1% (KENALOG) 0.1 % CREAM    Apply 80 g topically daily as needed.    XARELTO 15 MG TAB    TAKE 1 TABLET DAILY   Changed and/or Refilled Medications    Modified Medication Previous Medication    ALBUTEROL (PROVENTIL/VENTOLIN HFA) 90 MCG/ACTUATION INHALER albuterol (PROVENTIL/VENTOLIN HFA) 90 mcg/actuation inhaler       Inhale 2 puffs into the lungs every 4 (four) hours as needed for Wheezing or Shortness of Breath. Rescue    Inhale 2 puffs into the lungs every 4 (four) hours as needed for Wheezing or Shortness of Breath. Rescue    TRAMADOL (ULTRAM) 50 MG TABLET traMADoL (ULTRAM) 50 mg tablet       Take 1 tablet (50 mg total) by mouth every 24 hours as needed for Pain.    Take 1 tablet (50 mg total) by mouth every 24 hours as needed for Pain.   Discontinued Medications    BUDESONIDE-FORMOTEROL 80-4.5 MCG (SYMBICORT) 80-4.5 MCG/ACTUATION HFAA    Inhale 2 puffs into the lungs 2 (two) times a day. Controller    DIAZEPAM (VALIUM) 5 MG TABLET    Take 5 mg by mouth daily as needed.    FAMOTIDINE (PEPCID) 20 MG TABLET    Take 1 tablet (20 mg total) by mouth 2 (two) times daily.    ONDANSETRON (ZOFRAN-ODT) 4 MG TBDL    Take 1-2 tablets (4-8 mg total) by mouth every 8 (eight) hours as needed (Nausea/Vomiting).    SUCRALFATE (CARAFATE) 1 GRAM TABLET    Take 1 tablet (1 g total) by mouth 4 (four) times daily.

## 2024-04-10 DIAGNOSIS — R94.4 DECREASED GFR: Primary | ICD-10-CM
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
## 2024-04-26 PROBLEM — S16.1XXA CERVICAL STRAIN, ACUTE, INITIAL ENCOUNTER: Status: ACTIVE | Noted: 2024-04-26

## 2024-05-23 ENCOUNTER — OFFICE VISIT (OUTPATIENT)
Dept: ORTHOPEDICS | Facility: CLINIC | Age: 74
End: 2024-05-23
Payer: MEDICARE

## 2024-05-23 VITALS
HEART RATE: 61 BPM | WEIGHT: 249.88 LBS | BODY MASS INDEX: 33.84 KG/M2 | SYSTOLIC BLOOD PRESSURE: 150 MMHG | HEIGHT: 72 IN | DIASTOLIC BLOOD PRESSURE: 76 MMHG

## 2024-05-23 DIAGNOSIS — M67.912 ROTATOR CUFF DYSFUNCTION, LEFT: Primary | ICD-10-CM

## 2024-05-23 DIAGNOSIS — G89.29 CHRONIC LEFT SHOULDER PAIN: ICD-10-CM

## 2024-05-23 DIAGNOSIS — M25.512 CHRONIC LEFT SHOULDER PAIN: ICD-10-CM

## 2024-05-23 PROCEDURE — 99999 PR PBB SHADOW E&M-EST. PATIENT-LVL V: CPT | Mod: PBBFAC,,,

## 2024-05-23 PROCEDURE — 99215 OFFICE O/P EST HI 40 MIN: CPT | Mod: PBBFAC,PN

## 2024-05-23 PROCEDURE — 99204 OFFICE O/P NEW MOD 45 MIN: CPT | Mod: S$PBB,,,

## 2024-05-23 NOTE — PROGRESS NOTES
Patient ID: Sveta العراقي is a 74 y.o. female    Pain of the Left Shoulder    History of Present Illness:    Sveta العراقي c PMHx of aib on xarelto presents to clinic for left shoulder pain. Patient denies known NILSA. The pain started 1 years ago and is becoming progressively worse.  Pain is located over (points to) anterior shoulder . She reports that the pain is a 7 /10 sharp pain today. The pain is affecting ADLs and limiting desired level of activity. Denies numbness, tingling, radiation and inability to bear weight. She is s/p R shoulder arthroscopy about 20 years ago. She has tried multiple CSIs to left shoulder with minimal relief. Pain has recently been worsening. Unable to perform overhead function. She did PT as well with no improvement. Denies left shoulder surgery or dislocations. Does have cervical pain, follows up with outside neurosurgeon. She is interested in surgical intervention.     Occupation: retired    Ambulating: unassisted  Diabetic: no  Smoking: no  Hx of DVT/PE: + PE in 98    PAST MEDICAL HISTORY:   Past Medical History:   Diagnosis Date    A-fib     Abnormal stress test 2017    Anticoagulant long-term use     Anxiety     Asthma     Chest pain 2018    Cholelithiasis     Chronic back pain     Chronic neck pain     Depression     Diverticulosis     Encounter for blood transfusion     GERD (gastroesophageal reflux disease)     Hernia     Hyperlipidemia 2017    Hypertension     Osteoarthritis     knee and back pain    Osteoporosis     Pneumonia     history    PTSD (post-traumatic stress disorder)     Pulmonary embolism 1999    post fall    Sleep apnea     could not tolerate c pap    Stroke     TIA (transient ischemic attack) 2017    no residual     PAST SURGICAL HISTORY:   Past Surgical History:   Procedure Laterality Date    ABDOMINAL SURGERY      bile duct repair x2    APPENDECTOMY      bile duct surgery      CARDIAC CATHETERIZATION  01/26/2018    CHOLECYSTECTOMY  1982    COLONOSCOPY   09/04/2019    COLONOSCOPY N/A 9/4/2019    Procedure: COLONOSCOPY;  Surgeon: Timothy Cutler MD;  Location: [facility] ENDO;  Service: Endoscopy;  Laterality: N/A;    COLONOSCOPY W/ BIOPSIES AND POLYPECTOMY  09/04/2019    ESOPHAGOGASTRODUODENOSCOPY N/A 10/2/2019    Procedure: EGD (ESOPHAGOGASTRODUODENOSCOPY);  Surgeon: Timothy Cutler MD;  Location: [facility] ENDO;  Service: Endoscopy;  Laterality: N/A;    EYE SURGERY      cataracts    HERNIA REPAIR      HIP ARTHROSCOPY W/ LABRAL REPAIR      right    HYSTERECTOMY      INJECTION OF ANESTHETIC AGENT AROUND MEDIAL BRANCH NERVES INNERVATING LUMBAR FACET JOINT Bilateral 2/14/2019    Procedure: BLOCK, NERVE, FACET JOINT, LUMBAR, MEDIAL BRANCH-kash MBB L4/5,L5/S1;  Surgeon: Rony Riddle III, MD;  Location: [facility] OR;  Service: Pain Management;  Laterality: Bilateral;    INSERTION OF IMPLANTABLE LOOP RECORDER Left 2017    OOPHORECTOMY      RADIOFREQUENCY ABLATION OF LUMBAR MEDIAL BRANCH NERVE AT SINGLE LEVEL Left 3/22/2019    Procedure: RADIOFREQUENCY ABLATION, NERVE, SPINAL, LUMBAR, MEDIAL BRANCH, 1 LEVEL-left RFA L4/5,L5/S1.;  Surgeon: Rony Riddle III, MD;  Location: [facility] CATH LAB;  Service: Pain Management;  Laterality: Left;    ROTATOR CUFF REPAIR      TOTAL KNEE ARTHROPLASTY      bilaterally     FAMILY HISTORY:   Family History   Problem Relation Name Age of Onset    Diabetes Mother      Cancer Mother      Stroke Mother      Ovarian cancer Mother      Heart disease Father      Cancer Sister      Stroke Brother       SOCIAL HISTORY:   Social History     Occupational History    Not on file   Tobacco Use    Smoking status: Never    Smokeless tobacco: Never   Substance and Sexual Activity    Alcohol use: Yes     Comment: socially on rare occasions    Drug use: No    Sexual activity: Not Currently     Partners: Male        MEDICATIONS:   Current Outpatient Medications:     albuterol (PROVENTIL) 2.5 mg /3 mL (0.083 %) nebulizer solution, Take 3 mLs (2.5 mg total) by  nebulization every 4 (four) hours as needed for Wheezing or Shortness of Breath. Rescue, Disp: 150 mL, Rfl: 3    albuterol (PROVENTIL/VENTOLIN HFA) 90 mcg/actuation inhaler, Inhale 2 puffs into the lungs every 4 (four) hours as needed for Wheezing or Shortness of Breath. Rescue, Disp: 8 g, Rfl: 2    alendronate (FOSAMAX) 35 MG tablet, TAKE 1 TABLET EVERY 7 DAYS, Disp: 12 tablet, Rfl: 3    atorvastatin (LIPITOR) 20 MG tablet, Take 1 tablet by mouth once daily., Disp: , Rfl:     calcium carbonate-vit D3-min 600 mg calcium- 400 unit Tab, Take 1 tablet by mouth 2 (two) times daily., Disp: 180 tablet, Rfl: 3    cyclobenzaprine (FLEXERIL) 10 MG tablet, TAKE 1 TABLET DAILY AS NEEDED FOR MUSCLE SPASMS, Disp: 90 tablet, Rfl: 1    KLOR-CON 8 8 mEq TbSR, TAKE 1 TABLET DAILY, Disp: 90 tablet, Rfl: 3    lidocaine-prilocaine (EMLA) cream, Apply topically 3 (three) times daily as needed., Disp: , Rfl:     lisinopriL 10 MG tablet, Take 10 mg by mouth., Disp: , Rfl:     metoprolol succinate (TOPROL-XL) 100 MG 24 hr tablet, Take 100 mg by mouth once daily., Disp: , Rfl:     montelukast (SINGULAIR) 10 mg tablet, Take 1 tablet (10 mg total) by mouth every evening., Disp: 90 tablet, Rfl: 3    pantoprazole (PROTONIX) 40 MG tablet, Take 40 mg by mouth., Disp: , Rfl:     primidone (MYSOLINE) 50 MG Tab, Take 50 mg by mouth every evening., Disp: , Rfl:     promethazine (PHENERGAN) 25 MG tablet, TAKE 1 TABLET EVERY 6 HOURS AS NEEDED FOR NAUSEA, Disp: 60 tablet, Rfl: 3    sertraline (ZOLOFT) 100 MG tablet, TAKE 1 TABLET DAILY, Disp: 90 tablet, Rfl: 3    traMADoL (ULTRAM) 50 mg tablet, Take 1 tablet (50 mg total) by mouth every 24 hours as needed for Pain., Disp: 90 tablet, Rfl: 1    traZODone (DESYREL) 100 MG tablet, TAKE 1 TABLET DAILY AT BEDTIME, Disp: 90 tablet, Rfl: 3    triamcinolone acetonide 0.1% (KENALOG) 0.1 % cream, Apply 80 g topically daily as needed., Disp: , Rfl:     XARELTO 15 mg Tab, TAKE 1 TABLET DAILY, Disp: 90 tablet, Rfl:  3  ALLERGIES:   Review of patient's allergies indicates:   Allergen Reactions    Baclofen Hives    Pamelor [nortriptyline] Palpitations    Baclofen (bulk) Rash         Physical Exam     Vitals:    05/23/24 1126   BP: (!) 150/76   Pulse: 61     Alert and oriented to person, place and time. No acute distress. Well-groomed, not ill appearing. Pupils round and reactive, normal respiratory effort, no audible wheezing.     Shoulder / Upper Extremity Exam    There are no masses, lesions or deformity.  No erythema.  Forward flexion passive 80, active 100.  Abduction passive 70, active 90.  There is pain to AC joint, biceps tendon, coracoid, supraspinatus and scapular spine.  Positive drop-arm, external rotation at the side 20.    Imaging:     3 view  left Shoulder X-rays ordered/reviewed by me showing no evidence of fracture or dislocation. There is no obvious malalignment. No evidence of masses, lesions or foreign bodies.       Assessment & Plan    Rotator cuff dysfunction, left  -     MRI Shoulder Without Contrast Left; Future; Expected date: 05/23/2024    Chronic left shoulder pain  -     Ambulatory referral/consult to Orthopedics  -     MRI Shoulder Without Contrast Left; Future; Expected date: 05/23/2024       I made the decision to obtain old records of the patient including previous notes and imaging. New imaging was ordered today of the extremity or extremities evaluated. I independently reviewed and interpreted the radiographs and/or MRIs/CT scan today as well as prior imaging.    We discussed at length different treatment options including conservative vs surgical management. These include anti-inflammatories, acetaminophen, rest, ice, heat, formal physical therapy including strengthening and stretching exercises, home exercise programs, injections, dry needling, and finally surgical intervention.      Patient here today for acute on chronic left shoulder pain.  This is likely degeneration of rotator cuff.   Patient has failed formal physical therapy as well as injections.  She is interested in surgical intervention.  Discussed MRI to evaluate for rotator cuff and follow up with the surgeon, however likely treatment would be reverse total shoulder arthroplasty.  Given past medical history, may be better surgical candidate in [place].  She understands she will need primary care clearance prior to undergoing any surgery.   MRI left shoulder ordered  Follow up with Dr. Suarez in [place] for results and surgical consideration  Primary care clearance prior to undergoing surgery    Follow up: Daniela  X-rays next visit: none    All questions were answered and patient is agreeable to the above plan.

## 2024-05-31 ENCOUNTER — HOSPITAL ENCOUNTER (OUTPATIENT)
Dept: RADIOLOGY | Facility: HOSPITAL | Age: 74
Discharge: HOME | End: 2024-05-31
Attending: FAMILY MEDICINE
Payer: MEDICARE

## 2024-05-31 DIAGNOSIS — Z12.31 BREAST CANCER SCREENING BY MAMMOGRAM: ICD-10-CM

## 2024-05-31 PROCEDURE — 77067 SCR MAMMO BI INCL CAD: CPT | Mod: 26,,, | Performed by: RADIOLOGY

## 2024-05-31 PROCEDURE — 77063 BREAST TOMOSYNTHESIS BI: CPT | Mod: TC

## 2024-05-31 PROCEDURE — 77063 BREAST TOMOSYNTHESIS BI: CPT | Mod: 26,,, | Performed by: RADIOLOGY

## 2024-06-04 ENCOUNTER — OFFICE VISIT (OUTPATIENT)
Dept: ORTHOPEDICS | Facility: CLINIC | Age: 74
End: 2024-06-04
Payer: MEDICARE

## 2024-06-04 VITALS — HEIGHT: 72 IN | WEIGHT: 249.81 LBS | BODY MASS INDEX: 33.83 KG/M2

## 2024-06-04 DIAGNOSIS — M25.512 CHRONIC LEFT SHOULDER PAIN: Primary | ICD-10-CM

## 2024-06-04 DIAGNOSIS — G89.29 CHRONIC LEFT SHOULDER PAIN: Primary | ICD-10-CM

## 2024-06-04 DIAGNOSIS — M75.22 BICIPITAL TENDINITIS, LEFT: ICD-10-CM

## 2024-06-04 DIAGNOSIS — M25.812 IMPINGEMENT OF LEFT SHOULDER: ICD-10-CM

## 2024-06-04 PROCEDURE — 99213 OFFICE O/P EST LOW 20 MIN: CPT | Mod: PBBFAC,PO | Performed by: ORTHOPAEDIC SURGERY

## 2024-06-04 PROCEDURE — 99999 PR PBB SHADOW E&M-EST. PATIENT-LVL III: CPT | Mod: PBBFAC,,, | Performed by: ORTHOPAEDIC SURGERY

## 2024-06-04 PROCEDURE — 99215 OFFICE O/P EST HI 40 MIN: CPT | Mod: S$PBB,,, | Performed by: ORTHOPAEDIC SURGERY

## 2024-06-04 NOTE — H&P (VIEW-ONLY)
Patient ID: Sveta العراقي is a 74 y.o. female    Pain of the Left Shoulder (10 out of 10 pain with movement)    History of Present Illness:    Sveta العراقي c PMHx of aib on xarelto presents to clinic for left shoulder pain. Patient denies known NILSA. The pain started 1 years ago and is becoming progressively worse.  Pain is located over (points to) anterior shoulder . She reports that the pain is a 7 /10 sharp pain today. The pain is affecting ADLs and limiting desired level of activity. Denies numbness, tingling, radiation and inability to bear weight. She is s/p R shoulder arthroscopy about 20 years ago. She has tried multiple CSIs to left shoulder with minimal relief. Pain has recently been worsening. Unable to perform overhead function. She did PT as well with no improvement. Denies left shoulder surgery or dislocations. Does have cervical pain, follows up with outside neurosurgeon. She is interested in surgical intervention.     Occupation: retired    Ambulating: unassisted  Diabetic: no  Smoking: no  Hx of DVT/PE: + PE in 98    PAST MEDICAL HISTORY:   Past Medical History:   Diagnosis Date    A-fib     Abnormal stress test 2017    Anticoagulant long-term use     Anxiety     Asthma     Chest pain 2018    Cholelithiasis     Chronic back pain     Chronic neck pain     Depression     Diverticulosis     Encounter for blood transfusion     GERD (gastroesophageal reflux disease)     Hernia     Hyperlipidemia 2017    Hypertension     Osteoarthritis     knee and back pain    Osteoporosis     Pneumonia     history    PTSD (post-traumatic stress disorder)     Pulmonary embolism 1999    post fall    Sleep apnea     could not tolerate c pap    Stroke     TIA (transient ischemic attack) 2017    no residual     PAST SURGICAL HISTORY:   Past Surgical History:   Procedure Laterality Date    ABDOMINAL SURGERY      bile duct repair x2    APPENDECTOMY      bile duct surgery      CARDIAC CATHETERIZATION  01/26/2018     CHOLECYSTECTOMY  1982    COLONOSCOPY  09/04/2019    COLONOSCOPY N/A 9/4/2019    Procedure: COLONOSCOPY;  Surgeon: Timothy Cutler MD;  Location: [facility] ENDO;  Service: Endoscopy;  Laterality: N/A;    COLONOSCOPY W/ BIOPSIES AND POLYPECTOMY  09/04/2019    ESOPHAGOGASTRODUODENOSCOPY N/A 10/2/2019    Procedure: EGD (ESOPHAGOGASTRODUODENOSCOPY);  Surgeon: Timothy Cutler MD;  Location: [facility] ENDO;  Service: Endoscopy;  Laterality: N/A;    EYE SURGERY      cataracts    HERNIA REPAIR      HIP ARTHROSCOPY W/ LABRAL REPAIR      right    HYSTERECTOMY      INJECTION OF ANESTHETIC AGENT AROUND MEDIAL BRANCH NERVES INNERVATING LUMBAR FACET JOINT Bilateral 2/14/2019    Procedure: BLOCK, NERVE, FACET JOINT, LUMBAR, MEDIAL BRANCH-kash MBB L4/5,L5/S1;  Surgeon: Rony Riddle III, MD;  Location: [facility] OR;  Service: Pain Management;  Laterality: Bilateral;    INSERTION OF IMPLANTABLE LOOP RECORDER Left 2017    OOPHORECTOMY      RADIOFREQUENCY ABLATION OF LUMBAR MEDIAL BRANCH NERVE AT SINGLE LEVEL Left 3/22/2019    Procedure: RADIOFREQUENCY ABLATION, NERVE, SPINAL, LUMBAR, MEDIAL BRANCH, 1 LEVEL-left RFA L4/5,L5/S1.;  Surgeon: Rony Riddle III, MD;  Location: [facility] CATH LAB;  Service: Pain Management;  Laterality: Left;    ROTATOR CUFF REPAIR      TOTAL KNEE ARTHROPLASTY      bilaterally     FAMILY HISTORY:   Family History   Problem Relation Name Age of Onset    Diabetes Mother      Cancer Mother      Stroke Mother      Ovarian cancer Mother      Heart disease Father      Cancer Sister      Stroke Brother       SOCIAL HISTORY:   Social History     Occupational History    Not on file   Tobacco Use    Smoking status: Never    Smokeless tobacco: Never   Substance and Sexual Activity    Alcohol use: Yes     Comment: socially on rare occasions    Drug use: No    Sexual activity: Not Currently     Partners: Male        MEDICATIONS:   Current Outpatient Medications:     albuterol (PROVENTIL) 2.5 mg /3 mL (0.083 %) nebulizer  solution, Take 3 mLs (2.5 mg total) by nebulization every 4 (four) hours as needed for Wheezing or Shortness of Breath. Rescue, Disp: 150 mL, Rfl: 3    albuterol (PROVENTIL/VENTOLIN HFA) 90 mcg/actuation inhaler, Inhale 2 puffs into the lungs every 4 (four) hours as needed for Wheezing or Shortness of Breath. Rescue, Disp: 8 g, Rfl: 2    alendronate (FOSAMAX) 35 MG tablet, TAKE 1 TABLET EVERY 7 DAYS, Disp: 12 tablet, Rfl: 3    atorvastatin (LIPITOR) 20 MG tablet, Take 1 tablet by mouth once daily., Disp: , Rfl:     calcium carbonate-vit D3-min 600 mg calcium- 400 unit Tab, Take 1 tablet by mouth 2 (two) times daily., Disp: 180 tablet, Rfl: 3    cyclobenzaprine (FLEXERIL) 10 MG tablet, TAKE 1 TABLET DAILY AS NEEDED FOR MUSCLE SPASMS, Disp: 90 tablet, Rfl: 1    KLOR-CON 8 8 mEq TbSR, TAKE 1 TABLET DAILY, Disp: 90 tablet, Rfl: 3    lidocaine-prilocaine (EMLA) cream, Apply topically 3 (three) times daily as needed., Disp: , Rfl:     lisinopriL 10 MG tablet, Take 10 mg by mouth., Disp: , Rfl:     metoprolol succinate (TOPROL-XL) 100 MG 24 hr tablet, Take 100 mg by mouth once daily., Disp: , Rfl:     montelukast (SINGULAIR) 10 mg tablet, Take 1 tablet (10 mg total) by mouth every evening., Disp: 90 tablet, Rfl: 3    pantoprazole (PROTONIX) 40 MG tablet, Take 40 mg by mouth., Disp: , Rfl:     primidone (MYSOLINE) 50 MG Tab, Take 50 mg by mouth every evening., Disp: , Rfl:     promethazine (PHENERGAN) 25 MG tablet, TAKE 1 TABLET EVERY 6 HOURS AS NEEDED FOR NAUSEA, Disp: 60 tablet, Rfl: 3    sertraline (ZOLOFT) 100 MG tablet, TAKE 1 TABLET DAILY, Disp: 90 tablet, Rfl: 3    traMADoL (ULTRAM) 50 mg tablet, Take 1 tablet (50 mg total) by mouth every 24 hours as needed for Pain., Disp: 90 tablet, Rfl: 1    traZODone (DESYREL) 100 MG tablet, TAKE 1 TABLET DAILY AT BEDTIME, Disp: 90 tablet, Rfl: 3    triamcinolone acetonide 0.1% (KENALOG) 0.1 % cream, Apply 80 g topically daily as needed., Disp: , Rfl:     XARELTO 15 mg Tab, TAKE  1 TABLET DAILY, Disp: 90 tablet, Rfl: 3  ALLERGIES:   Review of patient's allergies indicates:   Allergen Reactions    Baclofen Hives    Pamelor [nortriptyline] Palpitations    Baclofen (bulk) Rash         Physical Exam     There were no vitals filed for this visit.    Alert and oriented to person, place and time. No acute distress. Well-groomed, not ill appearing. Pupils round and reactive, normal respiratory effort, no audible wheezing.     Shoulder / Upper Extremity Exam    There are no masses, lesions or deformity.  No erythema.  Forward flexion passive 120, active 120.  Abduction passive 70, active 100.  There is pain to AC joint, biceps tendon, coracoid, supraspinatus and scapular spine.  Negative drop-arm, external rotation at the side 50.    Imaging:     3 view  left Shoulder X-rays ordered/reviewed by me showing no evidence of fracture or dislocation. There is no obvious malalignment. No evidence of masses, lesions or foreign bodies.     MRI left shoulder shows atrophy of the infraspinatus without obvious tearing of the rotator cuff.  Subacromial impingement and arthritis of the acromioclavicular joint with abnormality of the biceps tendon    Assessment & Plan    Chronic left shoulder pain    Bicipital tendinitis, left    Impingement of left shoulder         Sveta العراقي is a 74 y.o. female who has radiographic and clinical evidence of left shoulder impingement, biceps tendinitis, acromioclavicular arthritis    At this point the patient has failed extensive non-operative and conservative treatment with physician guided home exercise program, injections, PT, activity modification and NSAIDs/OTC medications. We discussed multiple options including non-operative and operative treatments. Non-operatively we discussed injections, HEP and formal PT.  Operatively we discussed arthroscopy of the shoulder with debridement, biceps tenotomy versus tenodesis, subacromial decompression with acromioplasty and  distal clavicle excision.  We also discussed  reverse shoulder arthroplasty.  We discussed the pros and cons of each surgery in detail as well as the risks and benefits of surgery, time required for recovery, need for physical therapy post-operatively and expectations long term. We discussed specifically the risks of damage to surrounding neurovascular structures, arthrofibrosis and stiffness, need for revision surgery or manipulation as well as chronic pain and dysfunction. Despite the risks of surgery, they do elect to proceed to improve function and pain.     _________________________________________________________________      Sveta العراقي will be scheduled for the following procedure:     Left Shoulder Arthroscopy with    Debridement of the rotator cuff  Biceps tenotomy versus tenodesis  Subacromial decompression with acromioplasty  Distal clavicle excision      Post-Op Medications to be prescribed:   Percocet 5/325mg Take 1-2 tablets every 4-6 hours PRN pain #28  Zofran 4mg oral disintegrating tablets every 8 hours PRN nausea/vomiting   Motrin 600 mg TID PRN   Flexeril 10mg BID PRN muscle spasms    Patient will need post-operative formal physical therapy to improve function, pain, and range of motion.     Medical Clearance: Yes  Hx of DVT,PE, anesthetic complications: Yes, history of PE in 1998      Additional notes/concerns:  None

## 2024-06-04 NOTE — PROGRESS NOTES
Patient ID: Sveta العراقي is a 74 y.o. female    Pain of the Left Shoulder (10 out of 10 pain with movement)    History of Present Illness:    Sveta العراقي c PMHx of aib on xarelto presents to clinic for left shoulder pain. Patient denies known NILSA. The pain started 1 years ago and is becoming progressively worse.  Pain is located over (points to) anterior shoulder . She reports that the pain is a 7 /10 sharp pain today. The pain is affecting ADLs and limiting desired level of activity. Denies numbness, tingling, radiation and inability to bear weight. She is s/p R shoulder arthroscopy about 20 years ago. She has tried multiple CSIs to left shoulder with minimal relief. Pain has recently been worsening. Unable to perform overhead function. She did PT as well with no improvement. Denies left shoulder surgery or dislocations. Does have cervical pain, follows up with outside neurosurgeon. She is interested in surgical intervention.     Occupation: retired    Ambulating: unassisted  Diabetic: no  Smoking: no  Hx of DVT/PE: + PE in 98    PAST MEDICAL HISTORY:   Past Medical History:   Diagnosis Date    A-fib     Abnormal stress test 2017    Anticoagulant long-term use     Anxiety     Asthma     Chest pain 2018    Cholelithiasis     Chronic back pain     Chronic neck pain     Depression     Diverticulosis     Encounter for blood transfusion     GERD (gastroesophageal reflux disease)     Hernia     Hyperlipidemia 2017    Hypertension     Osteoarthritis     knee and back pain    Osteoporosis     Pneumonia     history    PTSD (post-traumatic stress disorder)     Pulmonary embolism 1999    post fall    Sleep apnea     could not tolerate c pap    Stroke     TIA (transient ischemic attack) 2017    no residual     PAST SURGICAL HISTORY:   Past Surgical History:   Procedure Laterality Date    ABDOMINAL SURGERY      bile duct repair x2    APPENDECTOMY      bile duct surgery      CARDIAC CATHETERIZATION  01/26/2018     CHOLECYSTECTOMY  1982    COLONOSCOPY  09/04/2019    COLONOSCOPY N/A 9/4/2019    Procedure: COLONOSCOPY;  Surgeon: Timothy Cutler MD;  Location: [facility] ENDO;  Service: Endoscopy;  Laterality: N/A;    COLONOSCOPY W/ BIOPSIES AND POLYPECTOMY  09/04/2019    ESOPHAGOGASTRODUODENOSCOPY N/A 10/2/2019    Procedure: EGD (ESOPHAGOGASTRODUODENOSCOPY);  Surgeon: Timothy Cutler MD;  Location: [facility] ENDO;  Service: Endoscopy;  Laterality: N/A;    EYE SURGERY      cataracts    HERNIA REPAIR      HIP ARTHROSCOPY W/ LABRAL REPAIR      right    HYSTERECTOMY      INJECTION OF ANESTHETIC AGENT AROUND MEDIAL BRANCH NERVES INNERVATING LUMBAR FACET JOINT Bilateral 2/14/2019    Procedure: BLOCK, NERVE, FACET JOINT, LUMBAR, MEDIAL BRANCH-kash MBB L4/5,L5/S1;  Surgeon: Rony Riddle III, MD;  Location: [facility] OR;  Service: Pain Management;  Laterality: Bilateral;    INSERTION OF IMPLANTABLE LOOP RECORDER Left 2017    OOPHORECTOMY      RADIOFREQUENCY ABLATION OF LUMBAR MEDIAL BRANCH NERVE AT SINGLE LEVEL Left 3/22/2019    Procedure: RADIOFREQUENCY ABLATION, NERVE, SPINAL, LUMBAR, MEDIAL BRANCH, 1 LEVEL-left RFA L4/5,L5/S1.;  Surgeon: Rony Riddle III, MD;  Location: [facility] CATH LAB;  Service: Pain Management;  Laterality: Left;    ROTATOR CUFF REPAIR      TOTAL KNEE ARTHROPLASTY      bilaterally     FAMILY HISTORY:   Family History   Problem Relation Name Age of Onset    Diabetes Mother      Cancer Mother      Stroke Mother      Ovarian cancer Mother      Heart disease Father      Cancer Sister      Stroke Brother       SOCIAL HISTORY:   Social History     Occupational History    Not on file   Tobacco Use    Smoking status: Never    Smokeless tobacco: Never   Substance and Sexual Activity    Alcohol use: Yes     Comment: socially on rare occasions    Drug use: No    Sexual activity: Not Currently     Partners: Male        MEDICATIONS:   Current Outpatient Medications:     albuterol (PROVENTIL) 2.5 mg /3 mL (0.083 %) nebulizer  solution, Take 3 mLs (2.5 mg total) by nebulization every 4 (four) hours as needed for Wheezing or Shortness of Breath. Rescue, Disp: 150 mL, Rfl: 3    albuterol (PROVENTIL/VENTOLIN HFA) 90 mcg/actuation inhaler, Inhale 2 puffs into the lungs every 4 (four) hours as needed for Wheezing or Shortness of Breath. Rescue, Disp: 8 g, Rfl: 2    alendronate (FOSAMAX) 35 MG tablet, TAKE 1 TABLET EVERY 7 DAYS, Disp: 12 tablet, Rfl: 3    atorvastatin (LIPITOR) 20 MG tablet, Take 1 tablet by mouth once daily., Disp: , Rfl:     calcium carbonate-vit D3-min 600 mg calcium- 400 unit Tab, Take 1 tablet by mouth 2 (two) times daily., Disp: 180 tablet, Rfl: 3    cyclobenzaprine (FLEXERIL) 10 MG tablet, TAKE 1 TABLET DAILY AS NEEDED FOR MUSCLE SPASMS, Disp: 90 tablet, Rfl: 1    KLOR-CON 8 8 mEq TbSR, TAKE 1 TABLET DAILY, Disp: 90 tablet, Rfl: 3    lidocaine-prilocaine (EMLA) cream, Apply topically 3 (three) times daily as needed., Disp: , Rfl:     lisinopriL 10 MG tablet, Take 10 mg by mouth., Disp: , Rfl:     metoprolol succinate (TOPROL-XL) 100 MG 24 hr tablet, Take 100 mg by mouth once daily., Disp: , Rfl:     montelukast (SINGULAIR) 10 mg tablet, Take 1 tablet (10 mg total) by mouth every evening., Disp: 90 tablet, Rfl: 3    pantoprazole (PROTONIX) 40 MG tablet, Take 40 mg by mouth., Disp: , Rfl:     primidone (MYSOLINE) 50 MG Tab, Take 50 mg by mouth every evening., Disp: , Rfl:     promethazine (PHENERGAN) 25 MG tablet, TAKE 1 TABLET EVERY 6 HOURS AS NEEDED FOR NAUSEA, Disp: 60 tablet, Rfl: 3    sertraline (ZOLOFT) 100 MG tablet, TAKE 1 TABLET DAILY, Disp: 90 tablet, Rfl: 3    traMADoL (ULTRAM) 50 mg tablet, Take 1 tablet (50 mg total) by mouth every 24 hours as needed for Pain., Disp: 90 tablet, Rfl: 1    traZODone (DESYREL) 100 MG tablet, TAKE 1 TABLET DAILY AT BEDTIME, Disp: 90 tablet, Rfl: 3    triamcinolone acetonide 0.1% (KENALOG) 0.1 % cream, Apply 80 g topically daily as needed., Disp: , Rfl:     XARELTO 15 mg Tab, TAKE  1 TABLET DAILY, Disp: 90 tablet, Rfl: 3  ALLERGIES:   Review of patient's allergies indicates:   Allergen Reactions    Baclofen Hives    Pamelor [nortriptyline] Palpitations    Baclofen (bulk) Rash         Physical Exam     There were no vitals filed for this visit.    Alert and oriented to person, place and time. No acute distress. Well-groomed, not ill appearing. Pupils round and reactive, normal respiratory effort, no audible wheezing.     Shoulder / Upper Extremity Exam    There are no masses, lesions or deformity.  No erythema.  Forward flexion passive 120, active 120.  Abduction passive 70, active 100.  There is pain to AC joint, biceps tendon, coracoid, supraspinatus and scapular spine.  Negative drop-arm, external rotation at the side 50.    Imaging:     3 view  left Shoulder X-rays ordered/reviewed by me showing no evidence of fracture or dislocation. There is no obvious malalignment. No evidence of masses, lesions or foreign bodies.     MRI left shoulder shows atrophy of the infraspinatus without obvious tearing of the rotator cuff.  Subacromial impingement and arthritis of the acromioclavicular joint with abnormality of the biceps tendon    Assessment & Plan    Chronic left shoulder pain    Bicipital tendinitis, left    Impingement of left shoulder         Sveta العراقي is a 74 y.o. female who has radiographic and clinical evidence of left shoulder impingement, biceps tendinitis, acromioclavicular arthritis    At this point the patient has failed extensive non-operative and conservative treatment with physician guided home exercise program, injections, PT, activity modification and NSAIDs/OTC medications. We discussed multiple options including non-operative and operative treatments. Non-operatively we discussed injections, HEP and formal PT.  Operatively we discussed arthroscopy of the shoulder with debridement, biceps tenotomy versus tenodesis, subacromial decompression with acromioplasty and  distal clavicle excision.  We also discussed  reverse shoulder arthroplasty.  We discussed the pros and cons of each surgery in detail as well as the risks and benefits of surgery, time required for recovery, need for physical therapy post-operatively and expectations long term. We discussed specifically the risks of damage to surrounding neurovascular structures, arthrofibrosis and stiffness, need for revision surgery or manipulation as well as chronic pain and dysfunction. Despite the risks of surgery, they do elect to proceed to improve function and pain.     _________________________________________________________________      Sveta العراقي will be scheduled for the following procedure:     Left Shoulder Arthroscopy with    Debridement of the rotator cuff  Biceps tenotomy versus tenodesis  Subacromial decompression with acromioplasty  Distal clavicle excision      Post-Op Medications to be prescribed:   Percocet 5/325mg Take 1-2 tablets every 4-6 hours PRN pain #28  Zofran 4mg oral disintegrating tablets every 8 hours PRN nausea/vomiting   Motrin 600 mg TID PRN   Flexeril 10mg BID PRN muscle spasms    Patient will need post-operative formal physical therapy to improve function, pain, and range of motion.     Medical Clearance: Yes  Hx of DVT,PE, anesthetic complications: Yes, history of PE in 1998      Additional notes/concerns:  None

## 2024-06-06 ENCOUNTER — TELEPHONE (OUTPATIENT)
Dept: PRIMARY CARE CLINIC | Facility: CLINIC | Age: 74
End: 2024-06-06
Payer: MEDICARE

## 2024-06-06 NOTE — TELEPHONE ENCOUNTER
----- Message from Leah Serrato sent at 6/6/2024  9:54 AM CDT -----  Contact: Mobile  [phone]  Patient said that she's running late for her appointment that is scheduled on today due to her being lost.

## 2024-06-10 ENCOUNTER — OFFICE VISIT (OUTPATIENT)
Dept: PRIMARY CARE CLINIC | Facility: CLINIC | Age: 74
End: 2024-06-10
Payer: MEDICARE

## 2024-06-10 VITALS
SYSTOLIC BLOOD PRESSURE: 100 MMHG | DIASTOLIC BLOOD PRESSURE: 58 MMHG | WEIGHT: 252.19 LBS | OXYGEN SATURATION: 96 % | RESPIRATION RATE: 18 BRPM | BODY MASS INDEX: 34.16 KG/M2 | HEIGHT: 72 IN | TEMPERATURE: 97 F | HEART RATE: 66 BPM

## 2024-06-10 DIAGNOSIS — I10 ESSENTIAL HYPERTENSION, BENIGN: ICD-10-CM

## 2024-06-10 DIAGNOSIS — M25.812 IMPINGEMENT OF LEFT SHOULDER: ICD-10-CM

## 2024-06-10 DIAGNOSIS — M25.512 CHRONIC LEFT SHOULDER PAIN: ICD-10-CM

## 2024-06-10 DIAGNOSIS — G89.29 CHRONIC LEFT SHOULDER PAIN: ICD-10-CM

## 2024-06-10 DIAGNOSIS — I48.0 PAROXYSMAL ATRIAL FIBRILLATION: ICD-10-CM

## 2024-06-10 DIAGNOSIS — I70.0 AORTIC ATHEROSCLEROSIS: ICD-10-CM

## 2024-06-10 DIAGNOSIS — Z01.818 PREOPERATIVE EXAMINATION: Primary | ICD-10-CM

## 2024-06-10 DIAGNOSIS — M75.22 BICEPS TENDINITIS, LEFT: ICD-10-CM

## 2024-06-10 PROBLEM — R06.02 SHORTNESS OF BREATH: Status: RESOLVED | Noted: 2023-04-25 | Resolved: 2024-06-10

## 2024-06-10 PROCEDURE — 99999 PR PBB SHADOW E&M-EST. PATIENT-LVL V: CPT | Mod: PBBFAC,,, | Performed by: FAMILY MEDICINE

## 2024-06-10 PROCEDURE — 93010 ELECTROCARDIOGRAM REPORT: CPT | Mod: S$PBB,,, | Performed by: INTERNAL MEDICINE

## 2024-06-10 PROCEDURE — G2211 COMPLEX E/M VISIT ADD ON: HCPCS | Mod: S$PBB,,, | Performed by: FAMILY MEDICINE

## 2024-06-10 PROCEDURE — 99214 OFFICE O/P EST MOD 30 MIN: CPT | Mod: S$PBB,,, | Performed by: FAMILY MEDICINE

## 2024-06-10 PROCEDURE — 93005 ELECTROCARDIOGRAM TRACING: CPT | Mod: PBBFAC,PN | Performed by: INTERNAL MEDICINE

## 2024-06-10 PROCEDURE — 99215 OFFICE O/P EST HI 40 MIN: CPT | Mod: PBBFAC,PN,25 | Performed by: FAMILY MEDICINE

## 2024-06-10 NOTE — PROGRESS NOTES
Subjective:       Patient ID: Sveta العراقي is a 74 y.o. female.    Chief Complaint: Surgery Clearance (shoulder)    Needs clearance for left shoulder arthroscopy.  No recent illness or injury.  No prior surgical complications.  Compliant with all medications.  Denies chest pain, palpitations or shortness of breath.      Review of Systems   Constitutional:  Negative for fever.   HENT:  Negative for trouble swallowing.    Respiratory:  Negative for shortness of breath.    Cardiovascular:  Negative for chest pain.   Genitourinary:  Negative for difficulty urinating.   Musculoskeletal:  Positive for arthralgias.   Allergic/Immunologic: Negative for immunocompromised state.   Neurological:  Negative for dizziness.   Psychiatric/Behavioral:  Negative for agitation and confusion.        Objective:      Vitals:    06/10/24 1409   BP: (!) 100/58   BP Location: Left arm   Patient Position: Sitting   BP Method: Large (Manual)   Pulse: 66   Resp: 18   Temp: 97 °F (36.1 °C)   TempSrc: Temporal   SpO2: 96%   Weight: 114.4 kg (252 lb 3.3 oz)   Height: 6' (1.829 m)     BP Readings from Last 5 Encounters:   06/10/24 (!) 100/58   05/23/24 (!) 150/76   04/26/24 (!) 156/83   04/09/24 118/78   11/13/23 (!) 145/95     Wt Readings from Last 5 Encounters:   06/10/24 114.4 kg (252 lb 3.3 oz)   06/04/24 113.3 kg (249 lb 12.5 oz)   05/23/24 113.3 kg (249 lb 14.3 oz)   04/26/24 112.8 kg (248 lb 10.9 oz)   04/09/24 112.9 kg (248 lb 12.6 oz)     Physical Exam  Vitals and nursing note reviewed.   Constitutional:       General: She is not in acute distress.     Appearance: Normal appearance. She is well-developed.   HENT:      Head: Normocephalic and atraumatic.   Cardiovascular:      Rate and Rhythm: Normal rate and regular rhythm.      Heart sounds: Normal heart sounds.   Pulmonary:      Effort: Pulmonary effort is normal.      Breath sounds: Normal breath sounds.   Musculoskeletal:      Right lower leg: No edema.      Left lower leg: No  edema.   Skin:     General: Skin is warm and dry.   Neurological:      Mental Status: She is alert and oriented to person, place, and time.   Psychiatric:         Mood and Affect: Mood normal.         Behavior: Behavior normal.         Lab Results   Component Value Date    WBC 6.34 04/09/2024    HGB 13.3 04/09/2024    HCT 40.8 04/09/2024     04/09/2024    CHOL 168 04/09/2024    TRIG 53 04/09/2024    HDL 76 (H) 04/09/2024    ALT 12 04/09/2024    AST 18 04/09/2024     04/09/2024    K 4.8 04/09/2024     04/09/2024    CREATININE 1.4 04/09/2024    BUN 28 (H) 04/09/2024    CO2 25 04/09/2024    TSH 2.343 04/09/2024    INR 2.3 (H) 03/30/2023    HGBA1C 5.3 04/09/2024      Assessment:       1. Preoperative examination    2. Impingement of left shoulder    3. Chronic left shoulder pain    4. Biceps tendinitis, left    5. Paroxysmal atrial fibrillation    6. Essential hypertension, benign    7. Aortic atherosclerosis        Plan:       Preoperative examination  -     EKG 12-lead  EKG normal.  Patient is medically cleared for shoulder surgery under general anesthesia.  Okay to hold Xarelto for 48 hours prior to surgery, restart 24 hours after surgery if okay with ortho  Impingement of left shoulder    Chronic left shoulder pain    Biceps tendinitis, left    Paroxysmal atrial fibrillation  Medical management as above  Essential hypertension, benign  Stable, continue current meds  Aortic atherosclerosis  Medical management    Medication List with Changes/Refills   Current Medications    ALBUTEROL (PROVENTIL) 2.5 MG /3 ML (0.083 %) NEBULIZER SOLUTION    Take 3 mLs (2.5 mg total) by nebulization every 4 (four) hours as needed for Wheezing or Shortness of Breath. Rescue    ALBUTEROL (PROVENTIL/VENTOLIN HFA) 90 MCG/ACTUATION INHALER    Inhale 2 puffs into the lungs every 4 (four) hours as needed for Wheezing or Shortness of Breath. Rescue    ALENDRONATE (FOSAMAX) 35 MG TABLET    TAKE 1 TABLET EVERY 7 DAYS     ATORVASTATIN (LIPITOR) 20 MG TABLET    Take 1 tablet by mouth once daily.    CALCIUM CARBONATE-VIT D3- MG CALCIUM- 400 UNIT TAB    Take 1 tablet by mouth 2 (two) times daily.    CYCLOBENZAPRINE (FLEXERIL) 10 MG TABLET    TAKE 1 TABLET DAILY AS NEEDED FOR MUSCLE SPASMS    KLOR-CON 8 8 MEQ TBSR    TAKE 1 TABLET DAILY    LIDOCAINE-PRILOCAINE (EMLA) CREAM    Apply topically 3 (three) times daily as needed.    LISINOPRIL 10 MG TABLET    Take 10 mg by mouth.    METOPROLOL SUCCINATE (TOPROL-XL) 100 MG 24 HR TABLET    Take 100 mg by mouth once daily.    MONTELUKAST (SINGULAIR) 10 MG TABLET    Take 1 tablet (10 mg total) by mouth every evening.    PANTOPRAZOLE (PROTONIX) 40 MG TABLET    Take 40 mg by mouth.    PRIMIDONE (MYSOLINE) 50 MG TAB    Take 50 mg by mouth every evening.    PROMETHAZINE (PHENERGAN) 25 MG TABLET    TAKE 1 TABLET EVERY 6 HOURS AS NEEDED FOR NAUSEA    SERTRALINE (ZOLOFT) 100 MG TABLET    TAKE 1 TABLET DAILY    TRAMADOL (ULTRAM) 50 MG TABLET    Take 1 tablet (50 mg total) by mouth every 24 hours as needed for Pain.    TRAZODONE (DESYREL) 100 MG TABLET    TAKE 1 TABLET DAILY AT BEDTIME    TRIAMCINOLONE ACETONIDE 0.1% (KENALOG) 0.1 % CREAM    Apply 80 g topically daily as needed.    XARELTO 15 MG TAB    TAKE 1 TABLET DAILY

## 2024-06-11 ENCOUNTER — TELEPHONE (OUTPATIENT)
Dept: ORTHOPEDICS | Facility: CLINIC | Age: 74
End: 2024-06-11
Payer: MEDICARE

## 2024-06-11 LAB
OHS QRS DURATION: 86 MS
OHS QTC CALCULATION: 429 MS

## 2024-06-11 NOTE — TELEPHONE ENCOUNTER
----- Message from Ranjan Suarez MD sent at 6/11/2024 12:34 PM CDT -----    ----- Message -----  From: Kash Colin MD  Sent: 6/10/2024   2:47 PM CDT  To: Ranjan Suarez MD

## 2024-06-13 DIAGNOSIS — M75.22 BICIPITAL TENDINITIS, LEFT: Primary | ICD-10-CM

## 2024-06-13 DIAGNOSIS — Z01.818 PRE-OP TESTING: ICD-10-CM

## 2024-06-13 DIAGNOSIS — M75.40 ROTATOR CUFF IMPINGEMENT SYNDROME: ICD-10-CM

## 2024-06-13 DIAGNOSIS — M25.812 IMPINGEMENT OF LEFT SHOULDER: ICD-10-CM

## 2024-06-13 DIAGNOSIS — M67.912 ROTATOR CUFF DYSFUNCTION, LEFT: ICD-10-CM

## 2024-06-13 RX ORDER — MUPIROCIN 20 MG/G
OINTMENT TOPICAL
OUTPATIENT
Start: 2024-06-13

## 2024-06-14 ENCOUNTER — HOSPITAL ENCOUNTER (OUTPATIENT)
Dept: PREADMISSION TESTING | Facility: HOSPITAL | Age: 74
Discharge: HOME | End: 2024-06-14
Attending: ORTHOPAEDIC SURGERY
Payer: MEDICARE

## 2024-06-14 DIAGNOSIS — Z01.818 PREOP TESTING: Primary | ICD-10-CM

## 2024-06-14 DIAGNOSIS — Z01.818 PRE-OP TESTING: ICD-10-CM

## 2024-06-14 DIAGNOSIS — M75.22 BICIPITAL TENDINITIS, LEFT: ICD-10-CM

## 2024-06-14 DIAGNOSIS — M25.812 IMPINGEMENT OF LEFT SHOULDER: ICD-10-CM

## 2024-06-14 DIAGNOSIS — M67.912 ROTATOR CUFF DYSFUNCTION, LEFT: ICD-10-CM

## 2024-06-14 LAB
ANION GAP SERPL CALC-SCNC: 9 MMOL/L (ref 8–16)
APTT PPP: 40.9 SEC (ref 21–32)
BASOPHILS # BLD AUTO: 0.04 K/UL (ref 0–0.2)
BASOPHILS NFR BLD: 0.6 % (ref 0–1.9)
BUN SERPL-MCNC: 26 MG/DL (ref 8–23)
CALCIUM SERPL-MCNC: 9.5 MG/DL (ref 8.7–10.5)
CHLORIDE SERPL-SCNC: 106 MMOL/L (ref 95–110)
CO2 SERPL-SCNC: 20 MMOL/L (ref 23–29)
CREAT SERPL-MCNC: 1.3 MG/DL (ref 0.5–1.4)
DIFFERENTIAL METHOD BLD: ABNORMAL
EOSINOPHIL # BLD AUTO: 0.2 K/UL (ref 0–0.5)
EOSINOPHIL NFR BLD: 3.7 % (ref 0–8)
ERYTHROCYTE [DISTWIDTH] IN BLOOD BY AUTOMATED COUNT: 13 % (ref 11.5–14.5)
EST. GFR  (NO RACE VARIABLE): 44 ML/MIN/1.73 M^2
GLUCOSE SERPL-MCNC: 100 MG/DL (ref 70–110)
HCT VFR BLD AUTO: 35.8 % (ref 37–48.5)
HGB BLD-MCNC: 11.7 G/DL (ref 12–16)
IMM GRANULOCYTES # BLD AUTO: 0.02 K/UL (ref 0–0.04)
IMM GRANULOCYTES NFR BLD AUTO: 0.3 % (ref 0–0.5)
INR PPP: 1.2 (ref 0.8–1.2)
LYMPHOCYTES # BLD AUTO: 1.9 K/UL (ref 1–4.8)
LYMPHOCYTES NFR BLD: 29.5 % (ref 18–48)
MCH RBC QN AUTO: 31.1 PG (ref 27–31)
MCHC RBC AUTO-ENTMCNC: 32.7 G/DL (ref 32–36)
MCV RBC AUTO: 95 FL (ref 82–98)
MONOCYTES # BLD AUTO: 0.4 K/UL (ref 0.3–1)
MONOCYTES NFR BLD: 6.4 % (ref 4–15)
NEUTROPHILS # BLD AUTO: 3.9 K/UL (ref 1.8–7.7)
NEUTROPHILS NFR BLD: 59.5 % (ref 38–73)
NRBC BLD-RTO: 0 /100 WBC
PLATELET # BLD AUTO: 262 K/UL (ref 150–450)
PMV BLD AUTO: 10.9 FL (ref 9.2–12.9)
POTASSIUM SERPL-SCNC: 4.2 MMOL/L (ref 3.5–5.1)
PROTHROMBIN TIME: 13.2 SEC (ref 9–12.5)
RBC # BLD AUTO: 3.76 M/UL (ref 4–5.4)
SODIUM SERPL-SCNC: 135 MMOL/L (ref 136–145)
WBC # BLD AUTO: 6.57 K/UL (ref 3.9–12.7)

## 2024-06-14 PROCEDURE — 85610 PROTHROMBIN TIME: CPT | Performed by: ANESTHESIOLOGY

## 2024-06-14 PROCEDURE — 85730 THROMBOPLASTIN TIME PARTIAL: CPT | Performed by: ANESTHESIOLOGY

## 2024-06-14 PROCEDURE — 36415 COLL VENOUS BLD VENIPUNCTURE: CPT | Performed by: ANESTHESIOLOGY

## 2024-06-14 PROCEDURE — 80048 BASIC METABOLIC PNL TOTAL CA: CPT | Performed by: ORTHOPAEDIC SURGERY

## 2024-06-14 PROCEDURE — 85025 COMPLETE CBC W/AUTO DIFF WBC: CPT | Performed by: ORTHOPAEDIC SURGERY

## 2024-06-14 NOTE — DISCHARGE INSTRUCTIONS
To confirm, Your doctor has instructed you that surgery is scheduled for:   6-20-24    Please report to [facility], Registration the morning of surgery. You must check-in and receive a wristband before going to your procedure.  [address] [place] [address] [place] [id number]    Pre-Op will call the afternoon prior to surgery between 1:00 and 6:00 PM with the final arrival time.  Phone number: [phone]    PLEASE NOTE:  The surgery schedule has many variables which may affect the time of your surgery case.  Family members should be available if your surgery time changes.  Plan to be here the day of your procedure between 4-6 hours.    MEDICATIONS:  TAKE ONLY THESE MEDICATIONS WITH A SMALL SIP OF WATER THE MORNING OF YOUR PROCEDURE:          DO NOT TAKE THESE MEDICATIONS 5-7 DAYS PRIOR to your procedure or per your surgeon's request:   ASPIRIN, ALEVE, ADVIL, IBUPROFEN, FISH OIL VITAMIN E, HERBALS  (May take Tylenol)    ONLY if you are prescribed any types of blood thinners such as:  Aspirin, Coumadin, Plavix, Pradaxa, Xarelto, Aggrenox, Effient, Eliquis, Savasya, Brilinta, or any other, ask your surgeon whether you should stop taking them and how long before surgery you should stop.  You may also need to verify with the prescribing physician if it is ok to stop your medication.      INSTRUCTIONS IMPORTANT!!  Do not eat or drink anything between midnight and the time of your procedure- this includes gum, mints, and candy.  Do not smoke or drink alcoholic beverages 24 hours prior to your procedure.  Shower the night before AND the morning of your procedure with a Chlorhexidine wash such as Hibiclens or Dial antibacterial soap from the neck down.  Do not get it on your face or in your eyes.  You may use your own shampoo and face wash. This helps your skin to be as bacteria free as possible.    If you wear contact lenses, dentures, hearing aids or glasses, bring a container to put them in during surgery  and give to a family member for safe keeping.  Please leave all jewelry, piercing's and valuables at home. You must remove your false eyelashes prior to surgery.    DO NOT remove hair from the surgery site.  Do not shave the incision site unless you are given specific instructions to do so.    ONLY if you have been diagnosed with sleep apnea please bring your C-PAP machine.  ONLY if you wear home oxygen please bring your portable oxygen tank the day of your procedure.  ONLY if you have a history of OPEN HEART SURGERY you will need a clearance from your Cardiologist per Anesthesia.      ONLY for patients requiring bowel prep, written instructions will be given by your doctor's office.  ONLY if you have a neuro stimulator, please bring the controller with you the morning of surgery  ONLY if a type and screen test is needed before surgery, please return:  If your doctor has scheduled you for an overnight stay, bring a small overnight bag with any personal items you need.  Make arrangements in advance for transportation home by a responsible adult. You can not go home in an uber or a cab per hospital policy.  It is not safe to drive a vehicle during the 24 hours after anesthesia.          All  facilities and properties are tobacco free.  Smoking is NOT allowed.   If you have any questions about these instructions, call Pre-Op Admit  Nursing at [phone] or the Pre-Op Day Surgery Unit at [phone].

## 2024-06-19 ENCOUNTER — ANESTHESIA EVENT (OUTPATIENT)
Dept: SURGERY | Facility: HOSPITAL | Age: 74
End: 2024-06-19
Payer: MEDICARE

## 2024-06-19 DIAGNOSIS — M75.22 BICIPITAL TENDINITIS, LEFT: Primary | ICD-10-CM

## 2024-06-19 DIAGNOSIS — M67.912 ROTATOR CUFF DYSFUNCTION, LEFT: ICD-10-CM

## 2024-06-19 RX ORDER — OXYCODONE AND ACETAMINOPHEN 5; 325 MG/1; MG/1
1 TABLET ORAL EVERY 6 HOURS PRN
Qty: 28 TABLET | Refills: 0 | Status: SHIPPED | OUTPATIENT
Start: 2024-06-19

## 2024-06-19 RX ORDER — IBUPROFEN 600 MG/1
600 TABLET ORAL 3 TIMES DAILY
Qty: 90 TABLET | Refills: 1 | Status: SHIPPED | OUTPATIENT
Start: 2024-06-19

## 2024-06-19 RX ORDER — ONDANSETRON 4 MG/1
4 TABLET, ORALLY DISINTEGRATING ORAL EVERY 8 HOURS PRN
Qty: 30 TABLET | Refills: 0 | Status: SHIPPED | OUTPATIENT
Start: 2024-06-19

## 2024-06-19 RX ORDER — ASPIRIN 81 MG/1
81 TABLET ORAL 2 TIMES DAILY
Qty: 28 TABLET | Refills: 0 | Status: SHIPPED | OUTPATIENT
Start: 2024-06-19 | End: 2025-06-19

## 2024-06-20 ENCOUNTER — ANESTHESIA (OUTPATIENT)
Dept: SURGERY | Facility: HOSPITAL | Age: 74
End: 2024-06-20
Payer: MEDICARE

## 2024-06-20 ENCOUNTER — HOSPITAL ENCOUNTER (OUTPATIENT)
Facility: HOSPITAL | Age: 74
Discharge: HOME | End: 2024-06-20
Attending: ORTHOPAEDIC SURGERY | Admitting: ORTHOPAEDIC SURGERY
Payer: MEDICARE

## 2024-06-20 DIAGNOSIS — M75.22 BICIPITAL TENDINITIS, LEFT: ICD-10-CM

## 2024-06-20 DIAGNOSIS — M75.40 ROTATOR CUFF IMPINGEMENT SYNDROME: ICD-10-CM

## 2024-06-20 DIAGNOSIS — M25.812 IMPINGEMENT OF LEFT SHOULDER: ICD-10-CM

## 2024-06-20 DIAGNOSIS — Z01.818 PRE-OP TESTING: ICD-10-CM

## 2024-06-20 DIAGNOSIS — M67.912 ROTATOR CUFF DYSFUNCTION, LEFT: ICD-10-CM

## 2024-06-20 PROCEDURE — 37000009 HC ANESTHESIA EA ADD 15 MINS: Performed by: ORTHOPAEDIC SURGERY

## 2024-06-20 PROCEDURE — 71000033 HC RECOVERY, INTIAL HOUR: Performed by: ORTHOPAEDIC SURGERY

## 2024-06-20 PROCEDURE — 25000003 PHARM REV CODE 250: Performed by: ANESTHESIOLOGY

## 2024-06-20 PROCEDURE — 71000015 HC POSTOP RECOV 1ST HR: Performed by: ORTHOPAEDIC SURGERY

## 2024-06-20 PROCEDURE — 36000711: Performed by: ORTHOPAEDIC SURGERY

## 2024-06-20 PROCEDURE — 63600175 PHARM REV CODE 636 W HCPCS: Performed by: NURSE ANESTHETIST, CERTIFIED REGISTERED

## 2024-06-20 PROCEDURE — 29826 SHO ARTHRS SRG DECOMPRESSION: CPT | Mod: LT,,, | Performed by: ORTHOPAEDIC SURGERY

## 2024-06-20 PROCEDURE — 27201423 OPTIME MED/SURG SUP & DEVICES STERILE SUPPLY: Performed by: ORTHOPAEDIC SURGERY

## 2024-06-20 PROCEDURE — 63600175 PHARM REV CODE 636 W HCPCS: Performed by: ORTHOPAEDIC SURGERY

## 2024-06-20 PROCEDURE — 36000710: Performed by: ORTHOPAEDIC SURGERY

## 2024-06-20 PROCEDURE — 71000039 HC RECOVERY, EACH ADD'L HOUR: Performed by: ORTHOPAEDIC SURGERY

## 2024-06-20 PROCEDURE — 37000008 HC ANESTHESIA 1ST 15 MINUTES: Performed by: ORTHOPAEDIC SURGERY

## 2024-06-20 PROCEDURE — 25000003 PHARM REV CODE 250: Performed by: NURSE ANESTHETIST, CERTIFIED REGISTERED

## 2024-06-20 PROCEDURE — 63600175 PHARM REV CODE 636 W HCPCS: Performed by: ANESTHESIOLOGY

## 2024-06-20 PROCEDURE — 29824 SHO ARTHRS SRG DSTL CLAVICLC: CPT | Mod: 51,59,LT, | Performed by: ORTHOPAEDIC SURGERY

## 2024-06-20 PROCEDURE — 94799 UNLISTED PULMONARY SVC/PX: CPT

## 2024-06-20 PROCEDURE — C1713 ANCHOR/SCREW BN/BN,TIS/BN: HCPCS | Performed by: ORTHOPAEDIC SURGERY

## 2024-06-20 PROCEDURE — 25000003 PHARM REV CODE 250: Performed by: ORTHOPAEDIC SURGERY

## 2024-06-20 PROCEDURE — 29823 SHO ARTHRS SRG XTNSV DBRDMT: CPT | Mod: 51,59,LT, | Performed by: ORTHOPAEDIC SURGERY

## 2024-06-20 PROCEDURE — 29827 SHO ARTHRS SRG RT8TR CUF RPR: CPT | Mod: LT,,, | Performed by: ORTHOPAEDIC SURGERY

## 2024-06-20 DEVICE — 4.75 MM ARGO KNOTLESS ANCHOR
Type: IMPLANTABLE DEVICE | Site: SHOULDER | Status: FUNCTIONAL
Brand: ARGO KNOTLESS

## 2024-06-20 RX ORDER — ROCURONIUM BROMIDE 10 MG/ML
INJECTION, SOLUTION INTRAVENOUS
Status: DISCONTINUED | OUTPATIENT
Start: 2024-06-20 | End: 2024-06-20

## 2024-06-20 RX ORDER — OXYCODONE HYDROCHLORIDE 5 MG/1
5 TABLET ORAL EVERY 4 HOURS PRN
Status: CANCELLED | OUTPATIENT
Start: 2024-06-20

## 2024-06-20 RX ORDER — ONDANSETRON 4 MG/1
8 TABLET, ORALLY DISINTEGRATING ORAL EVERY 8 HOURS PRN
Status: CANCELLED | OUTPATIENT
Start: 2024-06-20

## 2024-06-20 RX ORDER — PROCHLORPERAZINE EDISYLATE 5 MG/ML
5 INJECTION INTRAMUSCULAR; INTRAVENOUS EVERY 6 HOURS PRN
Status: CANCELLED | OUTPATIENT
Start: 2024-06-20

## 2024-06-20 RX ORDER — LIDOCAINE HYDROCHLORIDE 20 MG/ML
INJECTION INTRAVENOUS
Status: DISCONTINUED | OUTPATIENT
Start: 2024-06-20 | End: 2024-06-20

## 2024-06-20 RX ORDER — EPINEPHRINE 1 MG/ML
INJECTION, SOLUTION, CONCENTRATE INTRAVENOUS
Status: DISCONTINUED | OUTPATIENT
Start: 2024-06-20 | End: 2024-06-20 | Stop reason: HOSPADM

## 2024-06-20 RX ORDER — ONDANSETRON HYDROCHLORIDE 2 MG/ML
INJECTION, SOLUTION INTRAMUSCULAR; INTRAVENOUS
Status: DISCONTINUED | OUTPATIENT
Start: 2024-06-20 | End: 2024-06-20

## 2024-06-20 RX ORDER — IBUPROFEN 600 MG/1
600 TABLET ORAL EVERY 6 HOURS PRN
Status: CANCELLED | OUTPATIENT
Start: 2024-06-20

## 2024-06-20 RX ORDER — LIDOCAINE HYDROCHLORIDE 10 MG/ML
1 INJECTION, SOLUTION EPIDURAL; INFILTRATION; INTRACAUDAL; PERINEURAL ONCE
Status: COMPLETED | OUTPATIENT
Start: 2024-06-20 | End: 2024-06-20

## 2024-06-20 RX ORDER — DEXAMETHASONE SODIUM PHOSPHATE 4 MG/ML
INJECTION, SOLUTION INTRA-ARTICULAR; INTRALESIONAL; INTRAMUSCULAR; INTRAVENOUS; SOFT TISSUE
Status: DISCONTINUED | OUTPATIENT
Start: 2024-06-20 | End: 2024-06-20

## 2024-06-20 RX ORDER — MUPIROCIN 20 MG/G
OINTMENT TOPICAL
Status: DISCONTINUED | OUTPATIENT
Start: 2024-06-20 | End: 2024-06-20 | Stop reason: HOSPADM

## 2024-06-20 RX ORDER — SUCCINYLCHOLINE CHLORIDE 20 MG/ML
INJECTION INTRAMUSCULAR; INTRAVENOUS
Status: DISCONTINUED | OUTPATIENT
Start: 2024-06-20 | End: 2024-06-20

## 2024-06-20 RX ORDER — FENTANYL CITRATE 50 UG/ML
25-200 INJECTION, SOLUTION INTRAMUSCULAR; INTRAVENOUS
Status: DISCONTINUED | OUTPATIENT
Start: 2024-06-20 | End: 2024-06-20 | Stop reason: HOSPADM

## 2024-06-20 RX ORDER — FENTANYL CITRATE 50 UG/ML
25 INJECTION, SOLUTION INTRAMUSCULAR; INTRAVENOUS EVERY 5 MIN PRN
Status: DISCONTINUED | OUTPATIENT
Start: 2024-06-20 | End: 2024-06-20 | Stop reason: HOSPADM

## 2024-06-20 RX ORDER — MORPHINE SULFATE 2 MG/ML
3 INJECTION, SOLUTION INTRAMUSCULAR; INTRAVENOUS
Status: CANCELLED | OUTPATIENT
Start: 2024-06-20

## 2024-06-20 RX ORDER — PROPOFOL 10 MG/ML
VIAL (ML) INTRAVENOUS
Status: DISCONTINUED | OUTPATIENT
Start: 2024-06-20 | End: 2024-06-20

## 2024-06-20 RX ORDER — ONDANSETRON HYDROCHLORIDE 2 MG/ML
4 INJECTION, SOLUTION INTRAVENOUS ONCE AS NEEDED
Status: DISCONTINUED | OUTPATIENT
Start: 2024-06-20 | End: 2024-06-20 | Stop reason: HOSPADM

## 2024-06-20 RX ORDER — ACETAMINOPHEN 10 MG/ML
INJECTION, SOLUTION INTRAVENOUS
Status: DISCONTINUED | OUTPATIENT
Start: 2024-06-20 | End: 2024-06-20

## 2024-06-20 RX ORDER — MIDAZOLAM HYDROCHLORIDE 1 MG/ML
2 INJECTION, SOLUTION INTRAMUSCULAR; INTRAVENOUS
Status: DISCONTINUED | OUTPATIENT
Start: 2024-06-20 | End: 2024-06-20 | Stop reason: HOSPADM

## 2024-06-20 RX ORDER — OXYCODONE HYDROCHLORIDE 5 MG/1
5 TABLET ORAL ONCE AS NEEDED
Status: COMPLETED | OUTPATIENT
Start: 2024-06-20 | End: 2024-06-20

## 2024-06-20 RX ADMIN — SUCCINYLCHOLINE CHLORIDE 100 MG: 20 INJECTION, SOLUTION INTRAMUSCULAR; INTRAVENOUS at 09:06

## 2024-06-20 RX ADMIN — FENTANYL CITRATE 25 MCG: 50 INJECTION, SOLUTION INTRAMUSCULAR; INTRAVENOUS at 11:06

## 2024-06-20 RX ADMIN — MUPIROCIN 1 G: 20 OINTMENT TOPICAL at 07:06

## 2024-06-20 RX ADMIN — OXYCODONE HYDROCHLORIDE 5 MG: 5 TABLET ORAL at 11:06

## 2024-06-20 RX ADMIN — FENTANYL CITRATE 50 MCG: 50 INJECTION, SOLUTION INTRAMUSCULAR; INTRAVENOUS at 09:06

## 2024-06-20 RX ADMIN — LIDOCAINE HYDROCHLORIDE 50 MG: 20 INJECTION, SOLUTION INTRAVENOUS at 09:06

## 2024-06-20 RX ADMIN — ROCURONIUM BROMIDE 5 MG: 10 INJECTION, SOLUTION INTRAVENOUS at 09:06

## 2024-06-20 RX ADMIN — LIDOCAINE HYDROCHLORIDE 100 MG: 20 INJECTION, SOLUTION INTRAVENOUS at 10:06

## 2024-06-20 RX ADMIN — CEFAZOLIN 2 G: 2 INJECTION, POWDER, FOR SOLUTION INTRAMUSCULAR; INTRAVENOUS at 09:06

## 2024-06-20 RX ADMIN — SODIUM CHLORIDE, SODIUM GLUCONATE, SODIUM ACETATE, POTASSIUM CHLORIDE AND MAGNESIUM CHLORIDE: 526; 502; 368; 37; 30 INJECTION, SOLUTION INTRAVENOUS at 10:06

## 2024-06-20 RX ADMIN — PROPOFOL 110 MG: 10 INJECTION, EMULSION INTRAVENOUS at 09:06

## 2024-06-20 RX ADMIN — DEXAMETHASONE SODIUM PHOSPHATE 8 MG: 4 INJECTION, SOLUTION INTRA-ARTICULAR; INTRALESIONAL; INTRAMUSCULAR; INTRAVENOUS; SOFT TISSUE at 09:06

## 2024-06-20 RX ADMIN — FENTANYL CITRATE 50 MCG: 50 INJECTION, SOLUTION INTRAMUSCULAR; INTRAVENOUS at 10:06

## 2024-06-20 RX ADMIN — ONDANSETRON 4 MG: 2 INJECTION INTRAMUSCULAR; INTRAVENOUS at 09:06

## 2024-06-20 RX ADMIN — MIDAZOLAM HYDROCHLORIDE 1 MG: 1 INJECTION, SOLUTION INTRAMUSCULAR; INTRAVENOUS at 08:06

## 2024-06-20 RX ADMIN — LIDOCAINE HYDROCHLORIDE 0.1 MG: 10 INJECTION, SOLUTION EPIDURAL; INFILTRATION; INTRACAUDAL; PERINEURAL at 07:06

## 2024-06-20 RX ADMIN — FENTANYL CITRATE 50 MCG: 50 INJECTION, SOLUTION INTRAMUSCULAR; INTRAVENOUS at 08:06

## 2024-06-20 RX ADMIN — ACETAMINOPHEN 1000 MG: 10 INJECTION, SOLUTION INTRAVENOUS at 09:06

## 2024-06-20 RX ADMIN — MIDAZOLAM HYDROCHLORIDE 1 MG: 1 INJECTION, SOLUTION INTRAMUSCULAR; INTRAVENOUS at 09:06

## 2024-06-20 RX ADMIN — GLYCOPYRROLATE 0.2 MG: 0.2 INJECTION, SOLUTION INTRAMUSCULAR; INTRAVITREAL at 09:06

## 2024-06-20 RX ADMIN — SODIUM CHLORIDE, SODIUM GLUCONATE, SODIUM ACETATE, POTASSIUM CHLORIDE AND MAGNESIUM CHLORIDE: 526; 502; 368; 37; 30 INJECTION, SOLUTION INTRAVENOUS at 07:06

## 2024-06-20 NOTE — ANESTHESIA POSTPROCEDURE EVALUATION
Anesthesia Post Evaluation    Patient: Sveta العراقي    Procedure(s) Performed: Procedure(s) (LRB):  ARTHROSCOPY, SHOULDER, WITH SUBACROMIAL SPACE DECOMPRESSION (Left)  ARTHROSCOPY, SHOULDER, WITH DISTAL CLAVICLE EXCISION (Left)  REPAIR, ROTATOR CUFF, ARTHROSCOPIC (Left)  ARTHROSCOPY,SHOULDER,WITH BICEPS TENODESIS (Left)    Final Anesthesia Type: general      Patient location during evaluation: PACU  Patient participation: Yes- Able to Participate  Level of consciousness: sedated and awake  Post-procedure vital signs: reviewed and stable  Pain management: adequate  Airway patency: patent    PONV status at discharge: No PONV  Anesthetic complications: no      Cardiovascular status: hypertensive, blood pressure returned to baseline and hemodynamically stable  Respiratory status: spontaneous ventilation  Hydration status: euvolemic  Follow-up not needed.              Vitals Value Taken Time   /72 06/20/24 1137   Temp 36.6 °C (97.9 °F) 06/20/24 1105   Pulse 58 06/20/24 1139   Resp 19 06/20/24 1139   SpO2 100 % 06/20/24 1139   Vitals shown include unfiled device data.      No case tracking events are documented in the log.      Pain/Florence Score: Pain Rating Prior to Med Admin: 4 (6/20/2024 11:37 AM)  Florence Score: 3 (6/20/2024 11:05 AM)

## 2024-06-20 NOTE — ANESTHESIA PREPROCEDURE EVALUATION
06/20/2024  Sveta العراقي is a 74 y.o., female.    Anesthesia Evaluation    I have reviewed the Patient Summary Reports.     I have reviewed the Nursing Notes. I have reviewed the NPO Status.   I have reviewed the Medications.     Review of Systems  Anesthesia Hx:             Denies Family Hx of Anesthesia complications.    Denies Personal Hx of Anesthesia complications.                    Social:  Non-Smoker       Hematology/Oncology:  Hematology Normal   Oncology Normal                                   EENT/Dental:  EENT/Dental Normal           Cardiovascular:     Hypertension   CAD    Dysrhythmias atrial fibrillation     PVD hyperlipidemia   ECG has been reviewed. Off Xarelto 3 days Functional Capacity good / => 4 METS    Denies Congenital Heart Disease.      Denies Congenital Heart Disease.     Denies Deep Venous Thrombosis (DVT)            Hypertension         Pulmonary:    Asthma    Sleep Apnea   Asthma:    Obstructive Sleep Apnea (DIAZ).           Renal/:   Denies Renal Symptoms/Infections/Stones              Hepatic/GI:     GERD          Denies Liver Disease        Musculoskeletal:  Arthritis   Left shoulder   Joint Disease:  Arthritis          Neurological:  TIA, CVA   Headaches      Dx of Headaches     Chronic Pain Syndrome    Denies Seizure Disorder             CVA - Cerebrovasular Accident                 Endocrine:  Endocrine Normal     Denies Diabetes                      Psych:   anxiety depression                Physical Exam  General:  Well nourished and Obesity       Airway/Jaw/Neck:  Airway Findings: Mouth Opening: Normal     Tongue: Normal      General Airway Assessment: Adult      Mallampati: I   TM Distance: Normal, at least 6 cm                Chest/Lungs:  Chest/Lungs Findings:  Clear to auscultation       Heart/Vascular:  Heart Findings: Rate: Normal  Rhythm: Regular Rhythm                         Mental Status:  Mental Status Findings:  Alert and Oriented, Cooperative         Anesthesia Plan  Type of Anesthesia, risks & benefits discussed:  Anesthesia Type:  general    Patient's Preference:   Plan Factors:          Intra-op Monitoring Plan: standard ASA monitors  Intra-op Monitoring Plan Comments:   Post Op Pain Control Plan: multimodal analgesia and peripheral nerve block  Post Op Pain Control Plan Comments:     Induction:   IV and Inhalation  Beta Blocker:  Patient is on a Beta-Blocker and has received one dose within the past 24 hours (No further documentation required).       Informed Consent: Informed consent signed with the Patient and all parties understand the risks and agree with anesthesia plan.  All questions answered.  Anesthesia consent signed with patient.  ASA Score: 3     Day of Surgery Review of History & Physical: I have interviewed and examined the patient. I have reviewed the patient's H&P dated:              Ready For Surgery From Anesthesia Perspective.             Physical Exam  General: Well nourished and Obesity    Airway:  Mallampati: I   Mouth Opening: Normal  TM Distance: Normal, at least 6 cm  Tongue: Normal    Chest/Lungs:  Clear to auscultation    Heart:  Rate: Normal  Rhythm: Regular Rhythm        Anesthesia Plan  Type of Anesthesia, risks & benefits discussed:    Anesthesia Type: general  Intra-op Monitoring Plan: standard ASA monitors  Post Op Pain Control Plan: multimodal analgesia and peripheral nerve block  Induction:  IV and Inhalation  Informed Consent: Informed consent signed with the Patient and all parties understand the risks and agree with anesthesia plan.  All questions answered.   ASA Score: 3  Day of Surgery Review of History & Physical: I have interviewed and examined the patient. I have reviewed the patient's H&P dated:     Ready For Surgery From Anesthesia Perspective.     .

## 2024-06-20 NOTE — TRANSFER OF CARE
Anesthesia Transfer of Care Note    Patient: Sveta العراقي    Procedure(s) Performed: Procedure(s) (LRB):  ARTHROSCOPY, SHOULDER, WITH SUBACROMIAL SPACE DECOMPRESSION (Left)  ARTHROSCOPY, SHOULDER, WITH DISTAL CLAVICLE EXCISION (Left)  REPAIR, ROTATOR CUFF, ARTHROSCOPIC (Left)  ARTHROSCOPY,SHOULDER,WITH BICEPS TENODESIS (Left)    Patient location: PACU    Anesthesia Type: general    Transport from OR: Transported from OR on 2-3 L/min O2 by NC with adequate spontaneous ventilation    Post pain: adequate analgesia    Post assessment: no apparent anesthetic complications and tolerated procedure well    Post vital signs: stable    Level of consciousness: sedated and responds to stimulation    Nausea/Vomiting: no nausea/vomiting    Complications: none    Transfer of care protocol was followed    Last vitals: Visit Vitals  BP (!) 152/73 (BP Location: Right arm, Patient Position: Lying)   Pulse (!) 51   Temp 36.6 °C (97.9 °F) (Skin)   Resp 15   Ht 6' (1.829 m)   Wt 114.4 kg (252 lb 3.3 oz)   SpO2 100%   Breastfeeding No   BMI 34.21 kg/m²

## 2024-06-20 NOTE — DISCHARGE INSTRUCTIONS
Shoulder Arthroscopy Post-op Instructions    Wound Care  Maintain your operative dressing.  It is normal for the shoulder to bleed and swell following surgery - if blood soaks into the bandage, do not become alarmed - reinforce with additional dressing.  Please maintain steri-strips in place.  Remove surgical dressing on the second postoperative day and apply waterproof Band-Aids over incisions and change daily.   Keep surgical incisions clean and dry.  You may shower after removing the first dressing on the second postoperative day  by placing waterproof Band-Aids over incision areas.   Do NOT immerse the operative shoulder until 14 days after surgery     Icing  Icing is very important for the first 5-7 days after surgery.   Use an ice machine continuously or ice packs every 2 hours for 20 minutes daily until your first postoperative visit.   Do not place the ice bag or cooling device directly on the skin. Care must be taken to avoid frostbite to the skin    Activity  Gentle range of motion of your hand and elbow is encouraged.   While exercises are important, dont overdo it. Common sense is the rule. Increased swelling and/or pain is usually an indication youre overdoing it.  Start physical therapy within 1-3 weeks.   When sleeping or resting, inclined positions (ie: reclining chair) and a pillow under the forearm for support may provide better comfort STILL IN SLING   Avoid long periods of sitting or long distance traveling for 2 weeks.   NO driving until instructed otherwise by physician, it is illegal to drive in a sling     Sling  Wear your sling at all times.   May remove your sling to shower, or to do home exercise program (pendulums, elbow and wrist range of motion etc)     Medications  Do not drive a car or operate heavy machinery while taking narcotics.   You have been prescribed a narcotic (either Norco or Percocet) for pain control. This is to be used for a short time period.   Take 1-2 tablets every  4-6 hours as needed  Max of 12 pills per day  Plan on using for 2-5 days, depending on the level of pain.  Do not take additional Tylenol (Acetaminophen) while taking Percocet.      Common side effects include nausea, drowsiness and constipation. Take medication with food to decrease side effects.   Ibuprofen (600-800mg) may be taken in between the narcotic medication.   You should resume your normal medications for other conditions the day after surgery. You may not drive or operate heavy equipment while on narcotics. It is important not to drink while taking narcotic medication.     Diet  Resume normal diet as tolerated this evening. We have no specific diet restrictions after surgery, but extensive use of narcotics can lead to constipation. High fiber diets, lots of fluids and muscle activity can prevent this occurrence.  The anesthetic drugs used during surgery may cause nausea for the first 24 hours. If nausea is encountered, drink only clear liquids. The only solids should be dry crackers or toast. If the nausea and vomiting become severe or you show signs of being dehydrated (lack of urination), please call.      EMERGENCIES  Contact Dr. Suarez or his nurse if any of the following are present:   Difficulty breathing  Painful swelling or numbness   Unrelenting pain   Fever (over 101° - it is normal to have a low grade fever for the first day or two following surgery) or chills   Redness around incisions   Color of lower extremity   Continuous drainage or bleeding from incision (a small amount of drainage is expected)   Excessive nausea/vomiting       **If you have an emergency after office hours or on weekends, call our office and you will be connected to our page service - they will contact Dr. Suarez or one of his partners if he is unavailable.      **If you have an emergency that requires immediate attention, proceed to the nearest emergency room or call 911.            Discharge Instructions: After Your  "Surgery/Procedure  Youve just had surgery. During surgery you were given medicine called anesthesia to keep you relaxed and free of pain. After surgery you may have some pain or nausea. This is common. Here are some tips for feeling better and getting well after surgery.     Stay on schedule with your medication.   Going home  Your doctor or nurse will show you how to take care of yourself when you go home. He or she will also answer your questions. Have an adult family member or friend drive you home.      For your safety we recommend these precaution for the first 24 hours after your procedure:  Do not drive or use heavy equipment.  Do not make important decisions or sign legal papers.  Do not drink alcohol.  Have someone stay with you, if needed. He or she can watch for problems and help keep you safe.  Your concentration, balance, coordination, and judgement may be impaired for many hours after anesthesia.  Use caution when ambulating or standing up.     You may feel weak and "washed out" after anesthesia and surgery.      Subtle residual effects of general anesthesia or sedation with regional / local anesthesia can last more than 24 hours.  Rest for the remainder of the day or longer if your Doctor/Surgeon has advised you to do so.  Although you may feel normal within the first 24 hours, your reflexes and mental ability may be impaired without you realizing it.  You may feel dizzy, lightheaded or sleepy for 24 hours or longer.      Be sure to go to all follow-up visits with your doctor. And rest after your surgery for as long as your doctor tells you to.  Coping with pain  If you have pain after surgery, pain medicine will help you feel better. Take it as told, before pain becomes severe. Also, ask your doctor or pharmacist about other ways to control pain. This might be with heat, ice, or relaxation. And follow any other instructions your surgeon or nurse gives you.  Tips for taking pain medicine  To get the " best relief possible, remember these points:  Pain medicines can upset your stomach. Taking them with a little food may help.  Most pain relievers taken by mouth need at least 20 to 30 minutes to start to work.  Taking medicine on a schedule can help you remember to take it. Try to time your medicine so that you can take it before starting an activity. This might be before you get dressed, go for a walk, or sit down for dinner.  Constipation is a common side effect of pain medicines. Call your doctor before taking any medicines such as laxatives or stool softeners to help ease constipation. Also ask if you should skip any foods. Drinking lots of fluids and eating foods such as fruits and vegetables that are high in fiber can also help. Remember, do not take laxatives unless your surgeon has prescribed them.  Drinking alcohol and taking pain medicine can cause dizziness and slow your breathing. It can even be deadly. Do not drink alcohol while taking pain medicine.  Pain medicine can make you react more slowly to things. Do not drive or run machinery while taking pain medicine.  Your health care provider may tell you to take acetaminophen to help ease your pain. Ask him or her how much you are supposed to take each day. Acetaminophen or other pain relievers may interact with your prescription medicines or other over-the-counter (OTC) drugs. Some prescription medicines have acetaminophen and other ingredients. Using both prescription and OTC acetaminophen for pain can cause you to overdose. Read the labels on your OTC medicines with care. This will help you to clearly know the list of ingredients, how much to take, and any warnings. It may also help you not take too much acetaminophen. If you have questions or do not understand the information, ask your pharmacist or health care provider to explain it to you before you take the OTC medicine.  Managing nausea  Some people have an upset stomach after surgery. This is  often because of anesthesia, pain, or pain medicine, or the stress of surgery. These tips will help you handle nausea and eat healthy foods as you get better. If you were on a special food plan before surgery, ask your doctor if you should follow it while you get better. These tips may help:  Do not push yourself to eat. Your body will tell you when to eat and how much.  Start off with clear liquids and soup. They are easier to digest.  Next try semi-solid foods, such as mashed potatoes, applesauce, and gelatin, as you feel ready.  Slowly move to solid foods. Dont eat fatty, rich, or spicy foods at first.  Do not force yourself to have 3 large meals a day. Instead eat smaller amounts more often.  Take pain medicines with a small amount of solid food, such as crackers or toast, to avoid nausea.     Call your surgeon if  You still have pain an hour after taking medicine. The medicine may not be strong enough.  You feel too sleepy, dizzy, or groggy. The medicine may be too strong.  You have side effects like nausea, vomiting, or skin changes, such as rash, itching, or hives.       If you have obstructive sleep apnea  You were given anesthesia medicine during surgery to keep you comfortable and free of pain. After surgery, you may have more apnea spells because of this medicine and other medicines you were given. The spells may last longer than usual.   At home:  Keep using the continuous positive airway pressure (CPAP) device when you sleep. Unless your health care provider tells you not to, use it when you sleep, day or night. CPAP is a common device used to treat obstructive sleep apnea.  Talk with your provider before taking any pain medicine, muscle relaxants, or sedatives. Your provider will tell you about the possible dangers of taking these medicines.  © 5236-3083 [employer]. [address] [place] [address], [place] [address]. All rights reserved. This information is not intended as a substitute for  professional medical care. Always follow your healthcare professional's instructions.          Using an Incentive Spirometer    An incentive spirometer is a device that helps you do deep breathing exercises. These exercises expand your lungs, aid in circulation, and help prevent pneumonia. Deep breathing exercises also help you breathe better and improve the function of your lungs by:  Keeping your lungs clear  Strengthening your breathing muscles  Helping prevent respiratory complications or problems  The incentive spirometer gives you a way to take an active part in recover. A nurse or therapist will teach you breathing exercises. To do these exercises, you will breathe in through your mouth and not your nose. The incentive spirometer only works correctly if you breathe in through your mouth.  Steps to clear lungs  Step 1. Exhale normally. Then, inhale normally.  Relax and breathe out.  Step 2. Place your lips tightly around the mouthpiece.  Make sure the device is upright and not tilted.  Step 3. Inhale as much air as you can through the mouthpiece (don't breath through your nose).  Inhale slowly and deeply.  Hold your breath long enough to keep the balls or disk raised for at least 3 to 5 seconds, or as instructed by your healthcare provider.  Some spirometers have an indicator to let you know that you are breathing in too fast. If the indicator goes off, breathe in more slowly.  Step 4. Repeat the exercise regularly.  Do this exercise every hour while you're awake, or as instructed by your healthcare provider.  If you were taught deep breathing and coughing exercises, do them regularly as instructed by your healthcare provider.           Post op instructions for prevention of DVT  What is deep vein thrombosis?  Deep vein thrombosis (DVT) is the medical term for blood clots in the deep veins of the leg.  These blood clots can be dangerous.  A DVT can block a blood vessel and keep blood from getting where it  needs to go.  Another problem is that the clot can travel to other parts of the body such as the lungs.  A clot that travels to the lungs is called a pulmonary embolus (PE) and can cause serious problems with breathing which can lead to death.  Am I at risk for DVT/PE?  If you are not very active, you are at risk of DVT.  Anyone confined to bed, sitting for long periods of time, recovering from surgery, etc. increases the risk of DVT.  Other risk factors are cancer diagnosis, certain medications, estrogen replacement in any form,older age, obesity, pregnancy, smoking, history of clotting disorders, and dehydration.  How will I know if I have a DVT?  Swelling in the lower leg  Pain  Warmth, redness, hardness or bulging of the vein  If you have any of these symptoms, call your doctors office right away.  Some people will not have any symptoms until the clot moves to the lungs.  What are the symptoms of a PE?  Panting, shortness of breath, or trouble breathing  Sharp, knife-like chest pain when you breathe  Coughing or coughing up blood  Rapid heartbeat  If you have any of these symptoms or get worse quickly, call 911 for emergency treatment.  How can I prevent a DVT?  Avoid long periods of inactivity and dont cross your legs--get up and walk around every hour or so.  Stay active--walking after surgery is highly encouraged.  This means you should get out of the house and walk in the neighborhood.  Going up and down stairs will not impair healing (unless advised against such activity by your doctor).    Drink plenty of noncaffeinated, nonalcoholic fluids each day to prevent dehydration.  Wear special support stockings, if they have been advised by your doctor.  If you travel, stop at least once an hour and walk around.  Avoid smoking (assistance with stopping is available through your healthcare provider)  Always notify your doctor if you are not able to follow the post operative instructions that are given to you at  the time of discharge.  It may be necessary to prescribe one of the medications available to prevent DVT.          Exparel(bupivacaine) has been injected to provide approximately 72 hours of reduced pain after your surgery.  Do not remove the bracelet for five days.  Report to your doctor as soon as possible if you experience any of the following:   Restlessness   Anxiety   Speech problems    Lightheadedness   Numbness and tingling of the mouth and lips   Seizures    Metallic taste   Blurred vision   Tremors    Twitching   Depression   Extreme drowsiness  Avoid additional use of local anesthetics (such as dental procedures) for five days (96 hours).            We hope your stay was comfortable as you heal now, mend and rest.    For we have enjoyed taking care of you by giving your our best.    And as you get better, by regaining your health and strength;   We count it as a privilege to have served you and hope your time at [facility] was well spent.      Thank  You!!!

## 2024-06-20 NOTE — ANESTHESIA PROCEDURE NOTES
Intubation    Date/Time: 6/20/2024 9:24 AM    Performed by: Elias Brown CRNA  Authorized by: Dawit Ruelas MD    Intubation:     Induction:  Intravenous    Intubated:  Postinduction    Mask Ventilation:  Easy mask    Attempts:  1    Attempted By:  CRNA    Method of Intubation:  Video laryngoscopy    Blade:  Diggs 3    Laryngeal View Grade: Grade I - full view of cords      Difficult Airway Encountered?: No      Complications:  None    Airway Device:  Oral endotracheal tube    Airway Device Size:  7.0    Style/Cuff Inflation:  Cuffed (inflated to minimal occlusive pressure)    Tube secured:  22    Secured at:  The lips    Placement Verified By:  Capnometry    Complicating Factors:  None    Findings Post-Intubation:  BS equal bilateral and atraumatic/condition of teeth unchanged

## 2024-06-20 NOTE — PROGRESS NOTES
Has met unit/department guidelines for discharge from each phase of the post procedure continuum. Leaving floor per w/c. AAO x3. Resp even and unlabored room air. No distress noted. Tolerating Po fluids without c/o nausea/vomiting, dressing intact. Resume xarelto tomorrow , dressing clean , dry, intact, instructions reviewed with family at bedside.

## 2024-06-20 NOTE — BRIEF OP NOTE
[facility]  Brief Operative Note    Surgery Date: 6/20/2024     Surgeons and Role:     * Ranjan Suarez MD - Primary    Assisting Surgeon: None    Pre-op Diagnosis:  Bicipital tendinitis, left [M75.22]  Impingement of left shoulder [M25.812]  Rotator cuff dysfunction, left [M67.912]  Pre-op testing [Z01.818]    Post-op Diagnosis:  Post-Op Diagnosis Codes:     * Bicipital tendinitis, left [M75.22]     * Impingement of left shoulder [M25.812]     * Rotator cuff dysfunction, left [M67.912]     * Pre-op testing [Z01.818]    Procedure(s) (LRB):  ARTHROSCOPY, SHOULDER, WITH SUBACROMIAL SPACE DECOMPRESSION (Left)  ARTHROSCOPY, SHOULDER, WITH DISTAL CLAVICLE EXCISION (Left)  REPAIR, ROTATOR CUFF, ARTHROSCOPIC (Left)  ARTHROSCOPY,SHOULDER,WITH BICEPS TENODESIS (Left)    Anesthesia: General    Operative Findings: small full thickness bursal sided SS tear, impingement    Estimated Blood Loss: 1 mL         Specimens:   Specimen (24h ago, onward)      None              Discharge Note    OUTCOME: Patient tolerated treatment/procedure well without complication and is now ready for discharge.    DISPOSITION: Home or Self Care    FINAL DIAGNOSIS:  <principal problem not specified>    FOLLOWUP: In clinic    DISCHARGE INSTRUCTIONS:  No discharge procedures on file.

## 2024-06-20 NOTE — PROGRESS NOTES
Discussed with Dr Suarez about Aspirin and ok to discontinue from post op care. Also ok to take Ibuprofen short term post op for pain with the use of Xarelto. May resume Xarelto tomorrow evening. Pt and daughter v/u.

## 2024-06-20 NOTE — OP NOTE
06/20/2024    PREOPERATIVE DIAGNOSIS:      Left shoulder rotator cuff tear, supraspinatus  Left shoulder impingement  Left shoulder biceps tendinopathy  Left shoulder acromioclavicular arthritis    POSTOPERATIVE DIAGNOSIS: Same    PROCEDURE:      Arthroscopic left shoulder rotator cuff repair  Arthroscopic left shoulder subacromial decompression with acromioplasty  Arthroscopic left shoulder distal clavicle excision  Arthroscopic left shoulder biceps tenotomy  Arthroscopic left shoulder debridement of the shoulder with debridement of the labrum, bursa and glenoid cartilage      SURGEON: Ranjan Suarez MD    ASSISTANT: Will BEVERLY    He was present and scrubbed for the entirety of the procedure. They were required for patient positioning, retraction, insertion of implants and closure. Without him I would have been unable to safely perform the case due to only one scrub tech available.     ANESTHESIA:  General/Regional     ESTIMATED BLOOD LOSS:  10 mL    INDICATIONS:  Sveta العراقي is a 74 y.o. year-old female who presented to my clinic with a chief complaint of left shoulder pain. Imaging and clinical exam was consistent with rotator cuff syndrome and impingement. Patient had failed conservative treatment and ultimately elected to proceed with surgical intervention due to persistent pain and dysfunction despite conservative treatment.    We discussed the risks and benefits of the surgery in detail including stiffness, CRPS, damage to surrounding neurovascular structures, failure of fixation, arthrofibrosis and stiffness as well as need for revision surgery. Despite these risks they elected to proceed.       COMPONENTS USED:      Implant Name Type Inv. Item Serial No.  Lot No. LRB No. Used Action   y-knot pro rc anchor, two [employer] [id number] Left 1 Wasted   y-knot pro rc anchor, two [employer] [id number] Left 1 Wasted   Y-knot RC all-suture anchor w/ two  1.3mm hi-alphacityguides [facility]    [email or web address] [id number] Left 1 Implanted   ANCHOR SUT TEO KNOTLESS - [id number]  ANCHOR SUT TEO KNOTLESS  [email or web address]  Left 1 Implanted         DESCRIPTION OF PROCEDURE:  The patient was seen in the pre-operative area where consents were verified and the surgical site marked. They did receive a preoperative block for intra-operative and postoperative analgesia. The patient was taken to the Operating Room where anesthesia was administered by the Anesthesia Department. She was then placed in the lateral decubitus position on a bean bag and all superficial neurovascular structures were well padded.  The left shoulder  was then sterilely prepped and draped in the normal fashion.  Preoperative antibiotics were administered.  A time-out was performed verifying the procedure and laterality, all agreed and elected to proceed as planned.    We began by injecting 30 cc into the glenohumeral joint to distend the capsule from the posterior shoulder.  Stab incision was made posteriorly and the camera inserted into the glenohumeral joint.  Diagnostic arthroscopy was performed.    Findings:     Labral/Bicipital complex:  Biceps tendinitis, degenerative age-appropriate SLAP tear, fraying of the anterior labrum and posterior labrum debrided with an arthroscopic shaver to a stable base  Glenoid Cartilage:  Grade 1 central where debrided with an arthroscopic shaver to a stable base  Humeral Head Cartilage:  Intact  Rotator Interval:  Mild synovitis  Articular Sided rotator cuff:  Near full-thickness tear supraspinatus  Subscapularis:  Intact    After diagnostic arthroscopy was performed we created an anterior portal as well as an anterior superior lateral portal using direct visualization with a spinal needle.  Rotator interval release was performed from the superior border of the subscapularis to the biceps tendon, extending this laterally to the lesser tuberosity and bicipital groove.     Biceps  Tenodesis    We did elect to perform a Intra-articular  biceps tenotomy.  From our anterior portal we cut our biceps as it attached onto the labral anchor.  We left a cuff of tissue on the proximal biceps stump so that it would tenodesed into the bicipital groove.    Subacromial Decompression    We then turned our attention to the subacromial space.  The camera was placed within the subacromial space and we created a lateral portal using direct visualization with a spinal needle.  The bursal tissue was removed for visualization of the rotator cuff.  We also removed the bursal tissue and periosteum on the undersurface of the acromion using a combination of ablator and arthroscopic shaver.  There was a down hanging spur with downsloping anterior acromion which was impinging onto the rotator cuff.  We used a 5.5 mm bur from the anterior superior lateral portal and removed the excess bone anteriorly to create a flat surface and prevent impingement.    Distal Clavicle Excision    We then evaluated the distal clavicle.  The periosteum and bursal tissue was excised around the distal clavicle.  There was arthritic change of the distal clavicle as well as preoperative pain of the acromioclavicular joint and cross-body adduction.  For this reason we did elect to perform a distal clavicle excision.  From my anterior superior lateral portal as well as from my anterior portal, we resected the distal 5-10 mm of bone from the distal clavicle.    Rotator Cuff    At this time we placed a cannula laterally as well as anterior.  Bursectomy was performed using arthroscopic shaver for visualization.  The rotator cuff was evaluated.  There was a full-thickness tear of the bursal side supraspinatus.  It was crescent-shaped.  It was overall small less than 1 cm.  We then decorticated the bone slightly for bleeding bone.  We placed a double loaded all suture anchor using a spinal needle for visualization.  This was placed at the articular  margin.  We then passed sutures in a mattress configuration through the rotator cuff.  This was then pulled out of our lateral portal and tied using a sliding knot with 3 half hitches.    Final pictures were then taken arthroscopically.  Excess fluid was removed from the shoulder and the wounds were closed with 3-0 nylon interrupted.  Sterile dressing was applied and the patient was placed in a shoulder abduction pillow.  The patient was extubated and taken to the PACU in stable condition.  Patient overall tolerated the procedure well.        Disposition:      Postoperative sling, okay to remove sling for passive range of motion of the elbow and wrist.  Begin pendulums and shoulder shrugs.  Follow-up 2 weeks for wound check and range-of-motion check and initiate physical therapy    Ranjan Suarez MD

## 2024-06-20 NOTE — PLAN OF CARE
"Released from Pacu per Anesthesia when criteria met pain controlled skin w+d No nausea No emesis dsg dry intact aaox4 encouraged deep breaths Pt has all belongings in post op   with family ready for post op pain tolerable" arm on L numb for now in arm immobilizer has ice on shoulder on L dsg dry not able to wiggle fingers due to block good cap refill <3  "

## 2024-06-21 VITALS
RESPIRATION RATE: 18 BRPM | HEART RATE: 64 BPM | HEIGHT: 72 IN | DIASTOLIC BLOOD PRESSURE: 72 MMHG | TEMPERATURE: 97 F | WEIGHT: 252.19 LBS | OXYGEN SATURATION: 98 % | SYSTOLIC BLOOD PRESSURE: 169 MMHG | BODY MASS INDEX: 34.16 KG/M2

## 2024-06-21 NOTE — PROGRESS NOTES
"6/20/24  Pt. States  "All staff were kind and explained everything to me and my family. Excellent care given and very pleased. Stated very pleased with care given.  All staff were kind and helpful. Pt. And/or family member states they have read and understand all discharge instructions. They stated they know how to reach out to MD for any needs.    "

## 2024-07-02 ENCOUNTER — OFFICE VISIT (OUTPATIENT)
Dept: ORTHOPEDICS | Facility: CLINIC | Age: 74
End: 2024-07-02
Payer: MEDICARE

## 2024-07-02 VITALS — HEIGHT: 72 IN | BODY MASS INDEX: 34.16 KG/M2 | RESPIRATION RATE: 16 BRPM | WEIGHT: 252.19 LBS

## 2024-07-02 DIAGNOSIS — M25.812 IMPINGEMENT OF LEFT SHOULDER: Primary | ICD-10-CM

## 2024-07-02 DIAGNOSIS — M75.42 ROTATOR CUFF IMPINGEMENT SYNDROME OF LEFT SHOULDER: Primary | ICD-10-CM

## 2024-07-02 DIAGNOSIS — M67.912 ROTATOR CUFF DYSFUNCTION, LEFT: ICD-10-CM

## 2024-07-02 PROCEDURE — 99999 PR PBB SHADOW E&M-EST. PATIENT-LVL III: CPT | Mod: PBBFAC,,, | Performed by: ORTHOPAEDIC SURGERY

## 2024-07-02 PROCEDURE — 99213 OFFICE O/P EST LOW 20 MIN: CPT | Mod: PBBFAC,PO | Performed by: ORTHOPAEDIC SURGERY

## 2024-07-02 PROCEDURE — 99024 POSTOP FOLLOW-UP VISIT: CPT | Mod: POP,,, | Performed by: ORTHOPAEDIC SURGERY

## 2024-07-02 RX ORDER — METHYLPREDNISOLONE 4 MG/1
TABLET ORAL
Qty: 21 EACH | Refills: 0 | Status: SHIPPED | OUTPATIENT
Start: 2024-07-02 | End: 2024-07-23

## 2024-07-02 NOTE — PROGRESS NOTES
Post-op Note    HPI    Sveta العراقي is here 2 weeks s/p the following procedure:     PROCEDURE:       Arthroscopic left shoulder rotator cuff repair  Arthroscopic left shoulder subacromial decompression with acromioplasty  Arthroscopic left shoulder distal clavicle excision  Arthroscopic left shoulder biceps tenotomy  Arthroscopic left shoulder debridement of the shoulder with debridement of the labrum, bursa and glenoid cartilage    Overall doing well. Pain controlled on current regimen.  Does not complain of much pain in the shoulder.  Most of her issues were in the hand.  She reports pain and numbness and paresthesias of the ulnar 2 digits.  She noticed this after her block wore off.  The radial 3 digits without issues.  No motor deficit just pain.  Getting out of the sling does help.      Physical Exam:     Patient is alert and oriented no acute distress.   Assistive Device:  Sling    Left shoulder portal site Incision(s) are well healed.  There is no evidence of dehiscence.  There is no induration erythema or signs of infection.  Appropriate soft tissue swelling.  Compartments are soft and compressible.  Warm well-perfused extremity.  Swelling of the hand with pain with composite fist formation.    Imaging:     I have personally reviewed the following imaging and these are an interpretation of my findings:     I have reviewed and interpreted all pertinent imaging results/findings.  None    Assessment    Sveta العراقي is 2 weeks Post-op     Plan:    Overall doing as expected.  We discussed expectations of surgery and postoperative course.     Pain: Continued postoperative pain regimen -- we will add steroid pack to help with likely ulnar nerve neuritis.  Also recommend discontinuation of sling and work on gentle range of motion of the elbow and wrist as this seems to improve her pain  DVT prophylaxis:  None  PT/OT: Continue/Initiate physical therapy (weight bearing status:  Nonweightbearing), okay for  passive and active assisted range of motion of the shoulder     Follow-up: 4 weeks   X-rays next visit:  None

## 2024-07-07 ENCOUNTER — NURSE TRIAGE (OUTPATIENT)
Dept: ADMINISTRATIVE | Facility: CLINIC | Age: 74
End: 2024-07-07
Payer: MEDICARE

## 2024-07-08 ENCOUNTER — TELEPHONE (OUTPATIENT)
Dept: ORTHOPEDICS | Facility: CLINIC | Age: 74
End: 2024-07-08
Payer: MEDICARE

## 2024-07-08 NOTE — TELEPHONE ENCOUNTER
Left message requesting tp call back to discuss requested appt due to numbness in hand. Message forwarded to pt in portal.

## 2024-07-08 NOTE — TELEPHONE ENCOUNTER
----- Message from Allison Tobias sent at 7/8/2024  7:54 AM CDT -----  ANGEL ONOFRE calling regarding Same Day Appointment  for having pain and  numbness in the left hand fingers and the palm on the same side as the post-op shoulder surgery that was done on 06/20/24 and is wanting to be seen otday to be checked out. PT stated that she was given steroids to treat this issue and has completed the medication the the problem is still ongoing. PT went to the ED yesterday for this issue and was told to call in today to be seen as soon as possible, please call back to assist and inform if this request for today is possible,  [phone]

## 2024-07-08 NOTE — TELEPHONE ENCOUNTER
Pt had shoulder surgery on 6/20. Had a nerve block done with that surgery. Followed up on 7/1 in office. Pt still c/o pain in left hand as well as numbness and tingling to pinky, pain shooting down the side of her arm. Still swollen. Using ice and taking pain meds with no relief. Rates current pain 10/10.     Dispo- call provider now. Successfully reached OCP dr. Urias who advised pt can go to ED tonight If pain is unbearable, or wait until the morning and reach out to dr. Suarez's nurse. Caller advised, pt will go to ED tonight.   Reason for Disposition   [1] SEVERE post-op pain (e.g., excruciating, pain scale 8-10) AND [2] not controlled with pain medications    Additional Information   Negative: Sounds like a life-threatening emergency to the triager   Negative: [1] Widespread rash AND [2] bright red, sunburn-like   Negative: [1] SEVERE headache AND [2] after spinal (epidural) anesthesia   Negative: [1] Vomiting AND [2] persists > 4 hours   Negative: [1] Vomiting AND [2] abdomen looks much more swollen than usual   Negative: [1] Drinking very little AND [2] dehydration suspected (e.g., no urine > 12 hours, very dry mouth, very lightheaded)   Negative: Patient sounds very sick or weak to the triager   Negative: Sounds like a serious complication to the triager   Negative: Fever > 100.4 F (38.0 C)    Protocols used: Post-Op Symptoms and [id number]

## 2024-07-09 ENCOUNTER — TELEPHONE (OUTPATIENT)
Dept: ORTHOPEDICS | Facility: CLINIC | Age: 74
End: 2024-07-09

## 2024-07-09 ENCOUNTER — TELEPHONE (OUTPATIENT)
Dept: NEUROLOGY | Facility: CLINIC | Age: 74
End: 2024-07-09
Payer: MEDICARE

## 2024-07-09 ENCOUNTER — OFFICE VISIT (OUTPATIENT)
Dept: ORTHOPEDICS | Facility: CLINIC | Age: 74
End: 2024-07-09
Payer: MEDICARE

## 2024-07-09 VITALS — BODY MASS INDEX: 33.68 KG/M2 | WEIGHT: 248.69 LBS | HEIGHT: 72 IN

## 2024-07-09 DIAGNOSIS — R20.2 NUMBNESS AND TINGLING IN LEFT HAND: Primary | ICD-10-CM

## 2024-07-09 DIAGNOSIS — R20.0 NUMBNESS AND TINGLING IN LEFT HAND: Primary | ICD-10-CM

## 2024-07-09 PROCEDURE — 99024 POSTOP FOLLOW-UP VISIT: CPT | Mod: POP,,, | Performed by: ORTHOPAEDIC SURGERY

## 2024-07-09 PROCEDURE — 99212 OFFICE O/P EST SF 10 MIN: CPT | Mod: PBBFAC,PO | Performed by: ORTHOPAEDIC SURGERY

## 2024-07-09 PROCEDURE — 99999 PR PBB SHADOW E&M-EST. PATIENT-LVL II: CPT | Mod: PBBFAC,,, | Performed by: ORTHOPAEDIC SURGERY

## 2024-07-09 NOTE — PROGRESS NOTES
Post-op Note    HPI    Sveta العراقي is here 3 weeks s/p the following procedure:     PROCEDURE:       Arthroscopic left shoulder rotator cuff repair  Arthroscopic left shoulder subacromial decompression with acromioplasty  Arthroscopic left shoulder distal clavicle excision  Arthroscopic left shoulder biceps tenotomy  Arthroscopic left shoulder debridement of the shoulder with debridement of the labrum, bursa and glenoid cartilage    Overall doing ok in regards to her shoulder.  She denies any significant shoulder pain.  Since surgery and the block wearing off she has had persistent severe pain in the ulnar nerve distribution.  Most of her pain is in the wrist and ulnar 2 digits.  Denies any pain in the neck shoulder or elbow.  She also reports numbness of the ulnar 2 digits without motor deficit.      Physical Exam:     Patient is alert and oriented no acute distress.   Assistive Device:  None    Left shoulder portal site Incision(s) are well healed.  There is no evidence of dehiscence.  There is no induration erythema or signs of infection.  Appropriate soft tissue swelling.  Compartments are soft and compressible.  Warm well-perfused extremity.  Improved swelling of the hand with pain with composite fist formation.  Diminished sensation in the ulnar nerve distribution of the hand.  Positive Tinel's at the Guyon's canal.  Negative Tinel's at the elbow.  Negative Spurling's of the cervical spine    Imaging:     I have personally reviewed the following imaging and these are an interpretation of my findings:     I have reviewed and interpreted all pertinent imaging results/findings.  None    Assessment    Sveta العراقي is 3 weeks Post-op     Plan:    Overall doing well in regards to her shoulder but has had fairly persistent ulnar nerve symptoms since her surgery and since the block wore off.  Most of her pain seems to be isolated to the hand and wrist.  Concern is for compression at Guyon's canal or the  cubital tunnel.  I recommend a EMG soon as possible for evaluation.  She has tried nighttime splinting, sling removal as well as steroid pack and gabapentin without relief.  Referral placed for left upper extremity EMG

## 2024-07-09 NOTE — TELEPHONE ENCOUNTER
----- Message from Summer Buckley MA sent at 7/9/2024  3:02 PM CDT -----  Contact: pt  Pt states the EMG can be scheduled in sept,   Told her to call her insurance and ask who is in network, (refused to do this)  Wants office to reschedule with sooner appt and facility   Call back

## 2024-07-09 NOTE — TELEPHONE ENCOUNTER
Called patient to schedule EMG appt.  The next available in [place] in Sept and she is unable to wait that long, since she is in a lot of pain.  Informed patient will send a message to main campus for possibility of sooner appt.

## 2024-07-09 NOTE — TELEPHONE ENCOUNTER
EMG orders placed for the pt. Urgent, post op numbness/tingling right hand pinky and  ring finger. Dr. Suarez requesting urgent EMG. Please contact pt to schedule.

## 2024-07-10 ENCOUNTER — TELEPHONE (OUTPATIENT)
Dept: ORTHOPEDICS | Facility: CLINIC | Age: 74
End: 2024-07-10
Payer: MEDICARE

## 2024-07-10 NOTE — TELEPHONE ENCOUNTER
Pt was offered an appointment in [place] 7/12/24, but pt refused. Does not want to go out all that way from [place]. External orders sent to a compnay in [place] to see if they can get her sooner than [place]...

## 2024-07-10 NOTE — TELEPHONE ENCOUNTER
Spoke with Pt about getting scheduled on first available apt for EMG and she stated that she was able to get booked with an externally office for Tuesday to have the test done.

## 2024-07-10 NOTE — TELEPHONE ENCOUNTER
Spoke to patient about an EMG appt for 7/12 in [place], but she is not sure how to get there.  Therefor, she would prefers an appt in [place].

## 2024-07-22 ENCOUNTER — TELEPHONE (OUTPATIENT)
Dept: ORTHOPEDICS | Facility: CLINIC | Age: 74
End: 2024-07-22
Payer: MEDICARE

## 2024-07-22 NOTE — TELEPHONE ENCOUNTER
----- Message from Summer Buckley MA sent at 7/22/2024  3:34 PM CDT -----  Contact: pt  Pt calling on EMG results   Call back

## 2024-07-22 NOTE — TELEPHONE ENCOUNTER
Pt had external EMG. Advised pt to have results faxed. Fax number sent to her via portal messages.

## 2024-07-31 ENCOUNTER — OFFICE VISIT (OUTPATIENT)
Dept: ORTHOPEDICS | Facility: CLINIC | Age: 74
End: 2024-07-31
Payer: MEDICARE

## 2024-07-31 VITALS
HEIGHT: 72 IN | WEIGHT: 249.13 LBS | DIASTOLIC BLOOD PRESSURE: 83 MMHG | HEART RATE: 65 BPM | SYSTOLIC BLOOD PRESSURE: 155 MMHG | BODY MASS INDEX: 33.74 KG/M2

## 2024-07-31 DIAGNOSIS — G56.22 CUBITAL TUNNEL SYNDROME ON LEFT: Primary | ICD-10-CM

## 2024-07-31 PROCEDURE — 99999 PR PBB SHADOW E&M-EST. PATIENT-LVL V: CPT | Mod: PBBFAC,,,

## 2024-07-31 PROCEDURE — 99215 OFFICE O/P EST HI 40 MIN: CPT | Mod: PBBFAC,PN

## 2024-07-31 RX ORDER — MUPIROCIN 20 MG/G
OINTMENT TOPICAL
OUTPATIENT
Start: 2024-07-31

## 2024-07-31 RX ORDER — CEFAZOLIN SODIUM 2 G/50ML
2 SOLUTION INTRAVENOUS
OUTPATIENT
Start: 2024-07-31

## 2024-07-31 NOTE — H&P
Patient ID: Sveta العراقي is a 74 y.o. female    Numbness and Pain of the Left Hand    History of Present Illness:    Sveta العراقي presents to clinic for left 4th and 5th digit numbness.  She was 5 months status post left shoulder arthroscopy.  From a shoulder perspective she was doing well.  She denies any shoulder pain however since surgery in the block wearing off she was had persistent severe pain in the ulnar nerve distribution.  She tried nighttime elbow extension, gabapentin, and Medrol Dosepak without improvement in her symptoms.  Reports pain is very severe and she is having difficulty sleeping. She had an EMG and is back today to discuss results. The gabapentin caused stomach upset.     PAST MEDICAL HISTORY:   Past Medical History:   Diagnosis Date    A-fib     Abnormal stress test 2017    Anticoagulant long-term use     Anxiety     Asthma     Chest pain 2018    Cholelithiasis     Chronic back pain     Chronic neck pain     Depression     Diverticulosis     Encounter for blood transfusion     GERD (gastroesophageal reflux disease)     Hernia     Hyperlipidemia 2017    Hypertension     Osteoarthritis     knee and back pain    Osteoporosis     Pneumonia     history    PTSD (post-traumatic stress disorder)     Pulmonary embolism 1999    post fall    Sleep apnea     could not tolerate c pap    Stroke     TIA (transient ischemic attack) 2017    no residual     PAST SURGICAL HISTORY:   Past Surgical History:   Procedure Laterality Date    ABDOMINAL SURGERY      bile duct repair x2    APPENDECTOMY      ARTHROSCOPIC REPAIR OF ROTATOR CUFF OF SHOULDER Left 6/20/2024    Procedure: REPAIR, ROTATOR CUFF, ARTHROSCOPIC;  Surgeon: Ranjan Suarez MD;  Location: [facility];  Service: Orthopedics;  Laterality: Left;    ARTHROSCOPY OF SHOULDER WITH DECOMPRESSION OF SUBACROMIAL SPACE Left 6/20/2024    Procedure: ARTHROSCOPY, SHOULDER, WITH SUBACROMIAL SPACE DECOMPRESSION;  Surgeon: Ranjan Suarez MD;   Location: [facility] OR;  Service: Orthopedics;  Laterality: Left;  Sukh notified 6/14/24 ark    ARTHROSCOPY OF SHOULDER WITH REMOVAL OF DISTAL CLAVICLE Left 6/20/2024    Procedure: ARTHROSCOPY, SHOULDER, WITH DISTAL CLAVICLE EXCISION;  Surgeon: Ranjan Suarez MD;  Location: [facility] OR;  Service: Orthopedics;  Laterality: Left;    ARTHROSCOPY,SHOULDER,WITH BICEPS TENODESIS Left 6/20/2024    Procedure: ARTHROSCOPY,SHOULDER,WITH BICEPS TENODESIS;  Surgeon: Ranjan Suarez MD;  Location: [facility] OR;  Service: Orthopedics;  Laterality: Left;    bile duct surgery      CARDIAC CATHETERIZATION  01/26/2018    CHOLECYSTECTOMY  1982    COLONOSCOPY  09/04/2019    COLONOSCOPY N/A 9/4/2019    Procedure: COLONOSCOPY;  Surgeon: Timothy Cutler MD;  Location: [facility];  Service: Endoscopy;  Laterality: N/A;    COLONOSCOPY W/ BIOPSIES AND POLYPECTOMY  09/04/2019    ESOPHAGOGASTRODUODENOSCOPY N/A 10/2/2019    Procedure: EGD (ESOPHAGOGASTRODUODENOSCOPY);  Surgeon: Tmiothy Cutler MD;  Location: [facility];  Service: Endoscopy;  Laterality: N/A;    EYE SURGERY      cataracts    HERNIA REPAIR      HIP ARTHROSCOPY W/ LABRAL REPAIR      right    HYSTERECTOMY      INJECTION OF ANESTHETIC AGENT AROUND MEDIAL BRANCH NERVES INNERVATING LUMBAR FACET JOINT Bilateral 2/14/2019    Procedure: BLOCK, NERVE, FACET JOINT, LUMBAR, MEDIAL BRANCH-kash MBB L4/5,L5/S1;  Surgeon: Rony Riddle III, MD;  Location: [facility] OR;  Service: Pain Management;  Laterality: Bilateral;    INSERTION OF IMPLANTABLE LOOP RECORDER Left 2017    OOPHORECTOMY      RADIOFREQUENCY ABLATION OF LUMBAR MEDIAL BRANCH NERVE AT SINGLE LEVEL Left 3/22/2019    Procedure: RADIOFREQUENCY ABLATION, NERVE, SPINAL, LUMBAR, MEDIAL BRANCH, 1 LEVEL-left RFA L4/5,L5/S1.;  Surgeon: Rony Riddle III, MD;  Location: [facility] CATH LAB;  Service: Pain Management;  Laterality: Left;    ROTATOR CUFF REPAIR      TOTAL KNEE ARTHROPLASTY      bilaterally     FAMILY HISTORY:   Family History    Problem Relation Name Age of Onset    Diabetes Mother      Cancer Mother      Stroke Mother      Ovarian cancer Mother      Heart disease Father      Cancer Sister      Stroke Brother       SOCIAL HISTORY:   Social History     Occupational History    Not on file   Tobacco Use    Smoking status: Never    Smokeless tobacco: Never   Substance and Sexual Activity    Alcohol use: Yes     Comment: socially on rare occasions    Drug use: No    Sexual activity: Not Currently     Partners: Male        MEDICATIONS:   Current Outpatient Medications:     albuterol (PROVENTIL) 2.5 mg /3 mL (0.083 %) nebulizer solution, Take 3 mLs (2.5 mg total) by nebulization every 4 (four) hours as needed for Wheezing or Shortness of Breath. Rescue, Disp: 150 mL, Rfl: 3    albuterol (PROVENTIL/VENTOLIN HFA) 90 mcg/actuation inhaler, Inhale 2 puffs into the lungs every 4 (four) hours as needed for Wheezing or Shortness of Breath. Rescue, Disp: 8 g, Rfl: 2    alendronate (FOSAMAX) 35 MG tablet, TAKE 1 TABLET EVERY 7 DAYS, Disp: 12 tablet, Rfl: 3    aspirin (ECOTRIN) 81 MG EC tablet, Take 1 tablet (81 mg total) by mouth 2 (two) times a day., Disp: 28 tablet, Rfl: 0    atorvastatin (LIPITOR) 20 MG tablet, Take 1 tablet by mouth once daily., Disp: , Rfl:     calcium carbonate-vit D3-min 600 mg calcium- 400 unit Tab, Take 1 tablet by mouth 2 (two) times daily., Disp: 180 tablet, Rfl: 3    cyclobenzaprine (FLEXERIL) 10 MG tablet, TAKE 1 TABLET DAILY AS NEEDED FOR MUSCLE SPASMS, Disp: 90 tablet, Rfl: 1    ibuprofen (ADVIL,MOTRIN) 600 MG tablet, Take 1 tablet (600 mg total) by mouth 3 (three) times daily., Disp: 90 tablet, Rfl: 1    KLOR-CON 8 8 mEq TbSR, TAKE 1 TABLET DAILY, Disp: 90 tablet, Rfl: 3    lidocaine-prilocaine (EMLA) cream, Apply topically 3 (three) times daily as needed., Disp: , Rfl:     lisinopriL 10 MG tablet, Take 10 mg by mouth., Disp: , Rfl:     metoprolol succinate (TOPROL-XL) 100 MG 24 hr tablet, Take 100 mg by mouth once  daily., Disp: , Rfl:     montelukast (SINGULAIR) 10 mg tablet, Take 1 tablet (10 mg total) by mouth every evening., Disp: 90 tablet, Rfl: 3    ondansetron (ZOFRAN-ODT) 4 MG TbDL, Take 1 tablet (4 mg total) by mouth every 8 (eight) hours as needed (nausea)., Disp: 30 tablet, Rfl: 0    oxyCODONE-acetaminophen (PERCOCET) 5-325 mg per tablet, Take 1 tablet by mouth every 6 (six) hours as needed for Pain., Disp: 28 tablet, Rfl: 0    pantoprazole (PROTONIX) 40 MG tablet, Take 40 mg by mouth as needed., Disp: , Rfl:     primidone (MYSOLINE) 50 MG Tab, Take 50 mg by mouth every evening., Disp: , Rfl:     promethazine (PHENERGAN) 25 MG tablet, TAKE 1 TABLET EVERY 6 HOURS AS NEEDED FOR NAUSEA, Disp: 60 tablet, Rfl: 3    sertraline (ZOLOFT) 100 MG tablet, TAKE 1 TABLET DAILY, Disp: 90 tablet, Rfl: 3    traMADoL (ULTRAM) 50 mg tablet, Take 1 tablet (50 mg total) by mouth every 24 hours as needed for Pain., Disp: 90 tablet, Rfl: 1    traZODone (DESYREL) 100 MG tablet, TAKE 1 TABLET DAILY AT BEDTIME, Disp: 90 tablet, Rfl: 3    triamcinolone acetonide 0.1% (KENALOG) 0.1 % cream, Apply 80 g topically daily as needed., Disp: , Rfl:     XARELTO 15 mg Tab, TAKE 1 TABLET DAILY, Disp: 90 tablet, Rfl: 3    gabapentin (NEURONTIN) 100 MG capsule, Take 1 capsule (100 mg total) by mouth 3 (three) times daily. for 2 days, Disp: 6 capsule, Rfl: 0  ALLERGIES:   Review of patient's allergies indicates:   Allergen Reactions    Baclofen Hives    Pamelor [nortriptyline] Palpitations    Baclofen (bulk) Rash         Physical Exam     Vitals:    07/31/24 1449   BP: (!) 155/83   Pulse: 65     Alert and oriented to person, place and time. No acute distress. Well-groomed, not ill appearing. Pupils round and reactive, normal respiratory effort, no audible wheezing.     TENDERNESS / CREPITUS          T / C        Medial epicondyle   none  Med. (Ulnar) collateral ligament  none  Flexor pronator Musculature   none  Biceps tendon    none  Head of  radius    none    Lateral epicondyle   none   Extensor Musculature   none  Triceps tendon   none  Olecranon    none   Cubital fossa    +  Radial tunnel    none             Range of Motion   Right Elbow  AROM (PROM)     Extension   0 deg  (5 deg)   Flexion   145 deg (145 deg)         Pronation  90 deg  (90 deg)      Supination   80 deg  (80 deg)                 Left Elbow  AROM (PROM)     Extension   0 deg  (5 deg)   Flexion   145 deg (145 deg)         Pronation  90 deg  (90 deg)      Supination   80 deg  (80 deg)      Strength:   Elbow Flexion:   5/5  Elbow Extension:  5/5    ELBOW EXAMINATION:  Tinel's (Percussion) Test - Cubital  +  Tennis Elbow Test    neg  Golfer's Elbow Test    neg  Resisted Long Finger Extension Pain neg    Imaging:     EMG left elbow reviewed by me which shows 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 female who presents to clinic 5 months status post left shoulder arthroscopy.  Following surgery and wear off of the nerve block she had persistent cubital tunnel symptoms.  EMG did show ulnar nerve neuropathy at the elbow.  We discussed her EMG findings including treatment in detail.  We discussed cubital tunnel release.  If she does not get any relief, may consider referral to [facility] for further workup of C8 radiculopathy.  Understands it can take up to a year after surgery to have relief.  She will not need clearance, previously had clearance for shoulder arthroscopy.    Spoke with Dr. Suarez regarding case.  We discussed multiple treatment options including non-operative and operative treatment options. We discussed the indications for surgery as well as the rehabilitation associated with surgery, need for physical therapy, and the time table for returns to normal activities of  daily living. We discussed the risks and benefits of surgery in detail, specifically risks of damage to surrounding neurovascular structures, CRPS, stiffness/arthrofibrosis, poor functional result and possible need in the future for revision surgery including hardware removal were discussed. We also discussed risk of bleeding and infection, wound healing complications. Despite the risks as explained to them, they wished to proceed with surgery to restore normal anatomy, function and improve pain. She expressed understanding and agreement with the treatment plan and understand that no guarantee of outcome is implied.     _________________________________________________________________      Sveta Marleny العراقي will be scheduled for the following procedure(s):     Left ulnar nerve decompression      Post-Op Medications to be prescribed:   Percocet 5/325mg Take 1-2 tablets every 4-6 hours PRN pain #28  Zofran 4mg oral disintegrating tablets every 8 hours PRN nausea/vomiting   Motrin 600 mg TID PRN     DME/Bracing: none  Post-op Physical Therapy to begin: none, may continue PT for shoulder NWB    Medical Clearance: No, obtained previously  Hx of DVT,PE, anesthetic complications: Hx PE    Additional notes/concerns:  Stop Eliquis 3 days before surgery and resume postop day 1    Follow up: 2 weeks post op  X-rays next visit: none    All questions were answered and patient is agreeable to the above plan.

## 2024-07-31 NOTE — PROGRESS NOTES
Patient ID: Sveta العراقي is a 74 y.o. female    Numbness and Pain of the Left Hand    History of Present Illness:    Sveta العراقي presents to clinic for left 4th and 5th digit numbness.  She was 5 months status post left shoulder arthroscopy.  From a shoulder perspective she was doing well.  She denies any shoulder pain however since surgery in the block wearing off she was had persistent severe pain in the ulnar nerve distribution.  She tried nighttime elbow extension, gabapentin, and Medrol Dosepak without improvement in her symptoms.  Reports pain is very severe and she is having difficulty sleeping. She had an EMG and is back today to discuss results. The gabapentin caused stomach upset.     PAST MEDICAL HISTORY:   Past Medical History:   Diagnosis Date    A-fib     Abnormal stress test 2017    Anticoagulant long-term use     Anxiety     Asthma     Chest pain 2018    Cholelithiasis     Chronic back pain     Chronic neck pain     Depression     Diverticulosis     Encounter for blood transfusion     GERD (gastroesophageal reflux disease)     Hernia     Hyperlipidemia 2017    Hypertension     Osteoarthritis     knee and back pain    Osteoporosis     Pneumonia     history    PTSD (post-traumatic stress disorder)     Pulmonary embolism 1999    post fall    Sleep apnea     could not tolerate c pap    Stroke     TIA (transient ischemic attack) 2017    no residual     PAST SURGICAL HISTORY:   Past Surgical History:   Procedure Laterality Date    ABDOMINAL SURGERY      bile duct repair x2    APPENDECTOMY      ARTHROSCOPIC REPAIR OF ROTATOR CUFF OF SHOULDER Left 6/20/2024    Procedure: REPAIR, ROTATOR CUFF, ARTHROSCOPIC;  Surgeon: Ranjan Suarez MD;  Location: [facility];  Service: Orthopedics;  Laterality: Left;    ARTHROSCOPY OF SHOULDER WITH DECOMPRESSION OF SUBACROMIAL SPACE Left 6/20/2024    Procedure: ARTHROSCOPY, SHOULDER, WITH SUBACROMIAL SPACE DECOMPRESSION;  Surgeon: Ranjan Suarez MD;   Location: [facility] OR;  Service: Orthopedics;  Laterality: Left;  Sukh notified 6/14/24 ark    ARTHROSCOPY OF SHOULDER WITH REMOVAL OF DISTAL CLAVICLE Left 6/20/2024    Procedure: ARTHROSCOPY, SHOULDER, WITH DISTAL CLAVICLE EXCISION;  Surgeon: Ranjan Suarez MD;  Location: [facility] OR;  Service: Orthopedics;  Laterality: Left;    ARTHROSCOPY,SHOULDER,WITH BICEPS TENODESIS Left 6/20/2024    Procedure: ARTHROSCOPY,SHOULDER,WITH BICEPS TENODESIS;  Surgeon: Ranjan Suarez MD;  Location: [facility] OR;  Service: Orthopedics;  Laterality: Left;    bile duct surgery      CARDIAC CATHETERIZATION  01/26/2018    CHOLECYSTECTOMY  1982    COLONOSCOPY  09/04/2019    COLONOSCOPY N/A 9/4/2019    Procedure: COLONOSCOPY;  Surgeon: Timothy Cutler MD;  Location: [facility];  Service: Endoscopy;  Laterality: N/A;    COLONOSCOPY W/ BIOPSIES AND POLYPECTOMY  09/04/2019    ESOPHAGOGASTRODUODENOSCOPY N/A 10/2/2019    Procedure: EGD (ESOPHAGOGASTRODUODENOSCOPY);  Surgeon: Timothy Cutler MD;  Location: [facility];  Service: Endoscopy;  Laterality: N/A;    EYE SURGERY      cataracts    HERNIA REPAIR      HIP ARTHROSCOPY W/ LABRAL REPAIR      right    HYSTERECTOMY      INJECTION OF ANESTHETIC AGENT AROUND MEDIAL BRANCH NERVES INNERVATING LUMBAR FACET JOINT Bilateral 2/14/2019    Procedure: BLOCK, NERVE, FACET JOINT, LUMBAR, MEDIAL BRANCH-kash MBB L4/5,L5/S1;  Surgeon: Rony Riddle III, MD;  Location: [facility] OR;  Service: Pain Management;  Laterality: Bilateral;    INSERTION OF IMPLANTABLE LOOP RECORDER Left 2017    OOPHORECTOMY      RADIOFREQUENCY ABLATION OF LUMBAR MEDIAL BRANCH NERVE AT SINGLE LEVEL Left 3/22/2019    Procedure: RADIOFREQUENCY ABLATION, NERVE, SPINAL, LUMBAR, MEDIAL BRANCH, 1 LEVEL-left RFA L4/5,L5/S1.;  Surgeon: Rony Riddle III, MD;  Location: [facility] CATH LAB;  Service: Pain Management;  Laterality: Left;    ROTATOR CUFF REPAIR      TOTAL KNEE ARTHROPLASTY      bilaterally     FAMILY HISTORY:   Family History    Problem Relation Name Age of Onset    Diabetes Mother      Cancer Mother      Stroke Mother      Ovarian cancer Mother      Heart disease Father      Cancer Sister      Stroke Brother       SOCIAL HISTORY:   Social History     Occupational History    Not on file   Tobacco Use    Smoking status: Never    Smokeless tobacco: Never   Substance and Sexual Activity    Alcohol use: Yes     Comment: socially on rare occasions    Drug use: No    Sexual activity: Not Currently     Partners: Male        MEDICATIONS:   Current Outpatient Medications:     albuterol (PROVENTIL) 2.5 mg /3 mL (0.083 %) nebulizer solution, Take 3 mLs (2.5 mg total) by nebulization every 4 (four) hours as needed for Wheezing or Shortness of Breath. Rescue, Disp: 150 mL, Rfl: 3    albuterol (PROVENTIL/VENTOLIN HFA) 90 mcg/actuation inhaler, Inhale 2 puffs into the lungs every 4 (four) hours as needed for Wheezing or Shortness of Breath. Rescue, Disp: 8 g, Rfl: 2    alendronate (FOSAMAX) 35 MG tablet, TAKE 1 TABLET EVERY 7 DAYS, Disp: 12 tablet, Rfl: 3    aspirin (ECOTRIN) 81 MG EC tablet, Take 1 tablet (81 mg total) by mouth 2 (two) times a day., Disp: 28 tablet, Rfl: 0    atorvastatin (LIPITOR) 20 MG tablet, Take 1 tablet by mouth once daily., Disp: , Rfl:     calcium carbonate-vit D3-min 600 mg calcium- 400 unit Tab, Take 1 tablet by mouth 2 (two) times daily., Disp: 180 tablet, Rfl: 3    cyclobenzaprine (FLEXERIL) 10 MG tablet, TAKE 1 TABLET DAILY AS NEEDED FOR MUSCLE SPASMS, Disp: 90 tablet, Rfl: 1    ibuprofen (ADVIL,MOTRIN) 600 MG tablet, Take 1 tablet (600 mg total) by mouth 3 (three) times daily., Disp: 90 tablet, Rfl: 1    KLOR-CON 8 8 mEq TbSR, TAKE 1 TABLET DAILY, Disp: 90 tablet, Rfl: 3    lidocaine-prilocaine (EMLA) cream, Apply topically 3 (three) times daily as needed., Disp: , Rfl:     lisinopriL 10 MG tablet, Take 10 mg by mouth., Disp: , Rfl:     metoprolol succinate (TOPROL-XL) 100 MG 24 hr tablet, Take 100 mg by mouth once  daily., Disp: , Rfl:     montelukast (SINGULAIR) 10 mg tablet, Take 1 tablet (10 mg total) by mouth every evening., Disp: 90 tablet, Rfl: 3    ondansetron (ZOFRAN-ODT) 4 MG TbDL, Take 1 tablet (4 mg total) by mouth every 8 (eight) hours as needed (nausea)., Disp: 30 tablet, Rfl: 0    oxyCODONE-acetaminophen (PERCOCET) 5-325 mg per tablet, Take 1 tablet by mouth every 6 (six) hours as needed for Pain., Disp: 28 tablet, Rfl: 0    pantoprazole (PROTONIX) 40 MG tablet, Take 40 mg by mouth as needed., Disp: , Rfl:     primidone (MYSOLINE) 50 MG Tab, Take 50 mg by mouth every evening., Disp: , Rfl:     promethazine (PHENERGAN) 25 MG tablet, TAKE 1 TABLET EVERY 6 HOURS AS NEEDED FOR NAUSEA, Disp: 60 tablet, Rfl: 3    sertraline (ZOLOFT) 100 MG tablet, TAKE 1 TABLET DAILY, Disp: 90 tablet, Rfl: 3    traMADoL (ULTRAM) 50 mg tablet, Take 1 tablet (50 mg total) by mouth every 24 hours as needed for Pain., Disp: 90 tablet, Rfl: 1    traZODone (DESYREL) 100 MG tablet, TAKE 1 TABLET DAILY AT BEDTIME, Disp: 90 tablet, Rfl: 3    triamcinolone acetonide 0.1% (KENALOG) 0.1 % cream, Apply 80 g topically daily as needed., Disp: , Rfl:     XARELTO 15 mg Tab, TAKE 1 TABLET DAILY, Disp: 90 tablet, Rfl: 3    gabapentin (NEURONTIN) 100 MG capsule, Take 1 capsule (100 mg total) by mouth 3 (three) times daily. for 2 days, Disp: 6 capsule, Rfl: 0  ALLERGIES:   Review of patient's allergies indicates:   Allergen Reactions    Baclofen Hives    Pamelor [nortriptyline] Palpitations    Baclofen (bulk) Rash         Physical Exam     Vitals:    07/31/24 1449   BP: (!) 155/83   Pulse: 65     Alert and oriented to person, place and time. No acute distress. Well-groomed, not ill appearing. Pupils round and reactive, normal respiratory effort, no audible wheezing.     TENDERNESS / CREPITUS          T / C        Medial epicondyle   none  Med. (Ulnar) collateral ligament  none  Flexor pronator Musculature   none  Biceps tendon    none  Head of  radius    none    Lateral epicondyle   none   Extensor Musculature   none  Triceps tendon   none  Olecranon    none   Cubital fossa    +  Radial tunnel    none             Range of Motion   Right Elbow  AROM (PROM)     Extension   0 deg  (5 deg)   Flexion   145 deg (145 deg)         Pronation  90 deg  (90 deg)      Supination   80 deg  (80 deg)                 Left Elbow  AROM (PROM)     Extension   0 deg  (5 deg)   Flexion   145 deg (145 deg)         Pronation  90 deg  (90 deg)      Supination   80 deg  (80 deg)      Strength:   Elbow Flexion:   5/5  Elbow Extension:  5/5    ELBOW EXAMINATION:  Tinel's (Percussion) Test - Cubital  +  Tennis Elbow Test    neg  Golfer's Elbow Test    neg  Resisted Long Finger Extension Pain neg    Imaging:     EMG left elbow reviewed by me which shows 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 female who presents to clinic 5 months status post left shoulder arthroscopy.  Following surgery and wear off of the nerve block she had persistent cubital tunnel symptoms.  EMG did show ulnar nerve neuropathy at the elbow.  We discussed her EMG findings including treatment in detail.  We discussed cubital tunnel release.  If she does not get any relief, may consider referral to [facility] for further workup of C8 radiculopathy.  Understands it can take up to a year after surgery to have relief.  She will not need clearance, previously had clearance for shoulder arthroscopy.    Spoke with Dr. Suarez regarding case.  We discussed multiple treatment options including non-operative and operative treatment options. We discussed the indications for surgery as well as the rehabilitation associated with surgery, need for physical therapy, and the time table for returns to normal activities of  daily living. We discussed the risks and benefits of surgery in detail, specifically risks of damage to surrounding neurovascular structures, CRPS, stiffness/arthrofibrosis, poor functional result and possible need in the future for revision surgery including hardware removal were discussed. We also discussed risk of bleeding and infection, wound healing complications. Despite the risks as explained to them, they wished to proceed with surgery to restore normal anatomy, function and improve pain. She expressed understanding and agreement with the treatment plan and understand that no guarantee of outcome is implied.     _________________________________________________________________      Sveta Marleny العراقي will be scheduled for the following procedure(s):     Left ulnar nerve decompression      Post-Op Medications to be prescribed:   Percocet 5/325mg Take 1-2 tablets every 4-6 hours PRN pain #28  Zofran 4mg oral disintegrating tablets every 8 hours PRN nausea/vomiting   Motrin 600 mg TID PRN     DME/Bracing: none  Post-op Physical Therapy to begin: none, may continue PT for shoulder NWB    Medical Clearance: No, obtained previously  Hx of DVT,PE, anesthetic complications: Hx PE    Additional notes/concerns:  Stop Eliquis 3 days before surgery and resume postop day 1    Follow up: 2 weeks post op  X-rays next visit: none    All questions were answered and patient is agreeable to the above plan.

## 2024-08-03 DIAGNOSIS — Z71.89 COMPLEX CARE COORDINATION: ICD-10-CM

## 2024-08-12 DIAGNOSIS — G89.18 ACUTE POST-OPERATIVE PAIN: ICD-10-CM

## 2024-08-12 DIAGNOSIS — G56.22 CUBITAL TUNNEL SYNDROME ON LEFT: Primary | ICD-10-CM

## 2024-08-12 DIAGNOSIS — G56.22 CUBITAL TUNNEL SYNDROME ON LEFT: ICD-10-CM

## 2024-08-12 RX ORDER — IBUPROFEN 600 MG/1
600 TABLET ORAL 3 TIMES DAILY
Qty: 60 TABLET | Refills: 0 | Status: SHIPPED | OUTPATIENT
Start: 2024-08-12

## 2024-08-12 RX ORDER — ONDANSETRON 4 MG/1
4 TABLET, ORALLY DISINTEGRATING ORAL 2 TIMES DAILY
Qty: 20 TABLET | Refills: 0 | Status: SHIPPED | OUTPATIENT
Start: 2024-08-12 | End: 2024-08-12 | Stop reason: SDUPTHER

## 2024-08-12 RX ORDER — ONDANSETRON 4 MG/1
4 TABLET, ORALLY DISINTEGRATING ORAL 2 TIMES DAILY
Qty: 20 TABLET | Refills: 0 | Status: SHIPPED | OUTPATIENT
Start: 2024-08-12

## 2024-08-12 RX ORDER — OXYCODONE AND ACETAMINOPHEN 5; 325 MG/1; MG/1
1 TABLET ORAL EVERY 4 HOURS PRN
Qty: 28 EACH | Refills: 0 | Status: SHIPPED | OUTPATIENT
Start: 2024-08-12

## 2024-08-12 RX ORDER — IBUPROFEN 600 MG/1
600 TABLET ORAL 3 TIMES DAILY
Qty: 60 TABLET | Refills: 0 | Status: SHIPPED | OUTPATIENT
Start: 2024-08-12 | End: 2024-08-12 | Stop reason: SDUPTHER

## 2024-08-12 RX ORDER — OXYCODONE AND ACETAMINOPHEN 5; 325 MG/1; MG/1
1 TABLET ORAL EVERY 4 HOURS PRN
Qty: 28 EACH | Refills: 0 | Status: SHIPPED | OUTPATIENT
Start: 2024-08-12 | End: 2024-08-12 | Stop reason: SDUPTHER

## 2024-08-27 ENCOUNTER — OFFICE VISIT (OUTPATIENT)
Dept: ORTHOPEDICS | Facility: CLINIC | Age: 74
End: 2024-08-27
Payer: MEDICARE

## 2024-08-27 VITALS
DIASTOLIC BLOOD PRESSURE: 71 MMHG | BODY MASS INDEX: 34.46 KG/M2 | HEART RATE: 61 BPM | SYSTOLIC BLOOD PRESSURE: 129 MMHG | WEIGHT: 254.06 LBS

## 2024-08-27 DIAGNOSIS — Z98.890 S/P CUBITAL TUNNEL RELEASE: Primary | ICD-10-CM

## 2024-08-27 PROCEDURE — 99999 PR PBB SHADOW E&M-EST. PATIENT-LVL III: CPT | Mod: PBBFAC,,,

## 2024-08-27 PROCEDURE — 99213 OFFICE O/P EST LOW 20 MIN: CPT | Mod: PBBFAC,PN

## 2024-08-27 PROCEDURE — 99024 POSTOP FOLLOW-UP VISIT: CPT | Mod: POP,,,

## 2024-08-27 NOTE — PROGRESS NOTES
Post-op Note    HPI    Sveta العراقي is here 2 weeks s/p the following procedure:     8/12/2024  Left ulnar nerve decompression    Overall doing well. Pain controlled on current regimen. She is not in physical therapy.  States no improvement with surgery regarding her symptoms.. Denies any chest pain or shortness of breathe. Denies any drainage from the incision. Denies any fevers, chills or paresthesias.     DVT Prophylaxis:  None      Physical Exam:     Patient is alert and oriented no acute distress.   Assistive Device:  None    Left elbow: Suture tags removed Incision(s) are well healed.  There is no evidence of dehiscence.  There is no induration erythema or signs of infection.  Appropriate soft tissue swelling.  Compartments are soft and compressible.  Warm well-perfused extremity.    Imaging:     I have personally reviewed the following imaging and these are an interpretation of my findings:     None    Assessment    Sveta العراقي is 2 weeks Post-op     Plan:    Overall doing as expected.  We discussed expectations of surgery and postoperative course.  Discussed it can take up to 1 year to feel improvement status post cubital tunnel release.  However if she was not experiencing any relief at next appointment, may consider referral to [facility] as she did have evidence of C7/C8 neuropathy in her neck which could be contributing to her symptoms    Pain: Continued postoperative pain regimen -- ibuprofen/Tylenol p.r.n.  PT/OT: Continue/Initiate physical therapy (weight bearing status:  Cup of coffee x2 weeks then may increase as tolerated), range of motion as tolerated     Follow-up: 4 weeks   X-rays next visit: none    In 24 hours, you may shower without covering your incision.  Do not submerge your incision for another 2 weeks.  If steri strips applied, they can get wet, remove in 48-72 hours if not falling off on their own. Do not submerge incision for another 2 weeks. You may start to do a scar  massage with vitamin-E oil/cocoa butter to your incisions. Please inform the clinic if you experience any bleeding or discharge, warmth, or redness.

## 2024-09-12 DIAGNOSIS — J45.20 MILD INTERMITTENT ASTHMA WITHOUT COMPLICATION: ICD-10-CM

## 2024-09-12 RX ORDER — MONTELUKAST SODIUM 10 MG/1
10 TABLET ORAL NIGHTLY
Qty: 90 TABLET | Refills: 2 | Status: SHIPPED | OUTPATIENT
Start: 2024-09-12

## 2024-09-12 NOTE — TELEPHONE ENCOUNTER
Sveta العراقي  is requesting a refill authorization.  Brief Assessment and Rationale for Refill:  Approve     Medication Therapy Plan:         Comments:     Note composed:2:16 PM 09/12/2024

## 2024-09-12 NOTE — TELEPHONE ENCOUNTER
No care due was identified.  [facility] Embedded Care Due Messages. Reference number: [id number].   9/12/2024 2:01:47 PM CDT

## 2024-09-12 NOTE — TELEPHONE ENCOUNTER
----- Message from Cole Luevano sent at 9/12/2024 12:01 PM CDT -----  Contact: [facility] [place] [phone]  Requesting an RX refill or new RX.    Is this a refill or new RX: refill    RX name and strength (copy/paste from chart):  montelukast (SINGULAIR) 10 mg tablet    Is this a 30 day or 90 day RX:     Pharmacy name and phone # (copy/paste from chart):    [facility] - [address] [address]  Phone: [phone] Fax: [phone]

## 2024-09-19 ENCOUNTER — PATIENT MESSAGE (OUTPATIENT)
Dept: PRIMARY CARE CLINIC | Facility: CLINIC | Age: 74
End: 2024-09-19
Payer: MEDICARE

## 2024-09-30 ENCOUNTER — TELEPHONE (OUTPATIENT)
Dept: NEUROLOGY | Facility: CLINIC | Age: 74
End: 2024-09-30
Payer: MEDICARE

## 2024-09-30 NOTE — TELEPHONE ENCOUNTER
Called patient to schedule EMG study that was ordered back in July.  Patient stated she had a study already and she already had surgery in the elbow.  Will verify with ortho staff if this study is needed.

## 2024-10-09 ENCOUNTER — OFFICE VISIT (OUTPATIENT)
Dept: PRIMARY CARE CLINIC | Facility: CLINIC | Age: 74
End: 2024-10-09
Payer: MEDICARE

## 2024-10-09 VITALS
RESPIRATION RATE: 18 BRPM | BODY MASS INDEX: 34.08 KG/M2 | HEART RATE: 60 BPM | HEIGHT: 72 IN | OXYGEN SATURATION: 99 % | TEMPERATURE: 97 F | WEIGHT: 251.63 LBS | DIASTOLIC BLOOD PRESSURE: 78 MMHG | SYSTOLIC BLOOD PRESSURE: 138 MMHG

## 2024-10-09 DIAGNOSIS — M85.89 OSTEOPENIA OF MULTIPLE SITES: ICD-10-CM

## 2024-10-09 DIAGNOSIS — E78.5 HYPERLIPIDEMIA, UNSPECIFIED HYPERLIPIDEMIA TYPE: ICD-10-CM

## 2024-10-09 DIAGNOSIS — I10 ESSENTIAL HYPERTENSION, BENIGN: Primary | ICD-10-CM

## 2024-10-09 DIAGNOSIS — I48.0 PAROXYSMAL ATRIAL FIBRILLATION: ICD-10-CM

## 2024-10-09 DIAGNOSIS — N18.32 CHRONIC RENAL IMPAIRMENT, STAGE 3B: ICD-10-CM

## 2024-10-09 DIAGNOSIS — M51.369 DDD (DEGENERATIVE DISC DISEASE), LUMBAR: ICD-10-CM

## 2024-10-09 PROCEDURE — 99214 OFFICE O/P EST MOD 30 MIN: CPT | Mod: S$PBB,,, | Performed by: FAMILY MEDICINE

## 2024-10-09 PROCEDURE — 99999 PR PBB SHADOW E&M-EST. PATIENT-LVL IV: CPT | Mod: PBBFAC,,, | Performed by: FAMILY MEDICINE

## 2024-10-09 PROCEDURE — 99214 OFFICE O/P EST MOD 30 MIN: CPT | Mod: PBBFAC,PN | Performed by: FAMILY MEDICINE

## 2024-10-09 PROCEDURE — G2211 COMPLEX E/M VISIT ADD ON: HCPCS | Mod: S$PBB,,, | Performed by: FAMILY MEDICINE

## 2024-10-09 RX ORDER — TRAMADOL HYDROCHLORIDE 50 MG/1
50 TABLET ORAL
Qty: 90 TABLET | Refills: 1 | Status: SHIPPED | OUTPATIENT
Start: 2024-10-09

## 2024-10-09 NOTE — PROGRESS NOTES
Subjective:           Patient ID: Sveta العراقي   Age:  74 y.o.  Sex: female     Chief Complaint:   Follow-up (6 month/reg check up)      History of Present Illness:    Sveta العراقي is a 74 y.o. female who presents today with a chief complaint of Follow-up (6 month/reg check up)  .    Pt states she had shoulder surgery this year which left her with some numbness and pain in her 4th and 5th digits. She had ulnar decompression sx without any relief of sxs. She is following with ortho and has completed OT.     She reports her back pain has been the same and is well managed with her Tramadol which she would like a new script for at todays visit.     She got her flu shot yesterday    Review of Systems   Constitutional:  Negative for fatigue, fever and unexpected weight change.   HENT:  Negative for congestion, rhinorrhea and sore throat.    Respiratory:  Negative for cough and shortness of breath.    Cardiovascular:  Negative for chest pain and palpitations.   Gastrointestinal:  Negative for abdominal pain.   Musculoskeletal:  Positive for back pain.   Neurological:  Positive for numbness.           Objective:        Vitals:    10/09/24 0842   BP: 138/78   BP Location: Left arm   Patient Position: Sitting   Pulse: 60   Resp: 18   Temp: 97.4 °F (36.3 °C)   TempSrc: Temporal   SpO2: 99%   Weight: 114.1 kg (251 lb 10.5 oz)   Height: 6' (1.829 m)       Body mass index is 34.13 kg/m².      Physical Exam  Vitals reviewed.   Constitutional:       General: She is not in acute distress.     Appearance: Normal appearance.   HENT:      Head: Normocephalic and atraumatic.      Right Ear: Tympanic membrane and ear canal normal.      Left Ear: Tympanic membrane and ear canal normal.      Mouth/Throat:      Mouth: Mucous membranes are moist.      Pharynx: Oropharynx is clear.   Eyes:      Extraocular Movements: Extraocular movements intact.      Pupils: Pupils are equal, round, and reactive to light.   Cardiovascular:       Rate and Rhythm: Normal rate and regular rhythm.      Pulses: Normal pulses.      Heart sounds: Normal heart sounds.   Pulmonary:      Effort: Pulmonary effort is normal. No respiratory distress.      Breath sounds: Normal breath sounds.   Abdominal:      General: Abdomen is flat.      Palpations: Abdomen is soft.   Musculoskeletal:         General: Tenderness (supraspinous tenderness to lumbar spine around L4) present.      Cervical back: Normal range of motion and neck supple.   Skin:     General: Skin is warm and dry.   Neurological:      General: No focal deficit present.      Mental Status: She is alert and oriented to person, place, and time.      Sensory: Sensory deficit (numbness to right ring and pinky fingers) present.      Motor: No weakness.           Past Medical History:   Diagnosis Date    A-fib     Abnormal stress test 2017    Anticoagulant long-term use     Anxiety     Asthma     Chest pain 2018    Cholelithiasis     Chronic back pain     Chronic neck pain     Depression     Diverticulosis     Encounter for blood transfusion     GERD (gastroesophageal reflux disease)     Hernia     Hyperlipidemia 2017    Hypertension     Osteoarthritis     knee and back pain    Osteoporosis     Pneumonia     history    PTSD (post-traumatic stress disorder)     Pulmonary embolism 1999    post fall    Sleep apnea     could not tolerate c pap    Stroke     TIA (transient ischemic attack) 2017    no residual       Outpatient Encounter Medications as of 10/9/2024   Medication Sig Dispense Refill    albuterol (PROVENTIL) 2.5 mg /3 mL (0.083 %) nebulizer solution Take 3 mLs (2.5 mg total) by nebulization every 4 (four) hours as needed for Wheezing or Shortness of Breath. Rescue 150 mL 3    albuterol (PROVENTIL/VENTOLIN HFA) 90 mcg/actuation inhaler Inhale 2 puffs into the lungs every 4 (four) hours as needed for Wheezing or Shortness of Breath. Rescue 8 g 2    alendronate (FOSAMAX) 35 MG tablet TAKE 1 TABLET EVERY 7 DAYS  12 tablet 3    atorvastatin (LIPITOR) 20 MG tablet Take 1 tablet by mouth once daily.      cyclobenzaprine (FLEXERIL) 10 MG tablet TAKE 1 TABLET DAILY AS NEEDED FOR MUSCLE SPASMS 90 tablet 1    KLOR-CON 8 8 mEq TbSR TAKE 1 TABLET DAILY 90 tablet 3    lidocaine-prilocaine (EMLA) cream Apply topically 3 (three) times daily as needed.      lisinopriL 10 MG tablet Take 10 mg by mouth.      metoprolol succinate (TOPROL-XL) 100 MG 24 hr tablet Take 100 mg by mouth once daily.      montelukast (SINGULAIR) 10 mg tablet Take 1 tablet (10 mg total) by mouth every evening. 90 tablet 2    primidone (MYSOLINE) 50 MG Tab Take 50 mg by mouth every evening.      sertraline (ZOLOFT) 100 MG tablet TAKE 1 TABLET DAILY 90 tablet 3    traMADoL (ULTRAM) 50 mg tablet Take 1 tablet (50 mg total) by mouth every 24 hours as needed for Pain. 90 tablet 1    traZODone (DESYREL) 100 MG tablet TAKE 1 TABLET DAILY AT BEDTIME 90 tablet 3    triamcinolone acetonide 0.1% (KENALOG) 0.1 % cream Apply 80 g topically daily as needed.      XARELTO 15 mg Tab TAKE 1 TABLET DAILY 90 tablet 3    [DISCONTINUED] calcium carbonate-vit D3-min 600 mg calcium- 400 unit Tab Take 1 tablet by mouth 2 (two) times daily. 180 tablet 3    [DISCONTINUED] gabapentin (NEURONTIN) 100 MG capsule Take 1 capsule (100 mg total) by mouth 3 (three) times daily. for 2 days (Patient not taking: Reported on 8/6/2024) 6 capsule 0    [DISCONTINUED] ibuprofen (ADVIL,MOTRIN) 600 MG tablet Take 1 tablet (600 mg total) by mouth 3 (three) times daily. 60 tablet 0    [DISCONTINUED] montelukast (SINGULAIR) 10 mg tablet Take 1 tablet (10 mg total) by mouth every evening. 90 tablet 3    [DISCONTINUED] ondansetron (ZOFRAN-ODT) 4 MG TbDL Take 1 tablet (4 mg total) by mouth 2 (two) times daily. 20 tablet 0    [DISCONTINUED] oxyCODONE-acetaminophen (PERCOCET) 5-325 mg per tablet Take 1 tablet by mouth every 4 (four) hours as needed for Pain. 28 each 0     No facility-administered encounter  medications on file as of 10/9/2024.          Assessment:       No diagnosis found.       Plan:       There are no diagnoses linked to this encounter.          Encounter for Annual Exam  - DEXA scan    Degenerative disc disease  - Tramadol 50mg, 1 tablet every 24 hours PRN    Ulnar nerve compression  - ortho following s/p cubital tunnel release and shoulder sx  - continue home OT plan    Note written by Fernanda Hope MS4    Essential hypertension, benign    Osteopenia of multiple sites  -     DXA Bone Density Axial Skeleton 1 or more sites; Future; Expected date: 10/16/2024    DDD (degenerative disc disease), lumbar  -     traMADoL (ULTRAM) 50 mg tablet; Take 1 tablet (50 mg total) by mouth every 24 hours as needed for Pain.  Dispense: 90 tablet; Refill: 1    Paroxysmal atrial fibrillation    Chronic renal impairment, stage 3b  -     CBC Auto Differential; Future; Expected date: 04/09/2025  -     Microalbumin/Creatinine Ratio, Urine; Future; Expected date: 04/09/2025    Hyperlipidemia, unspecified hyperlipidemia type  -     Comprehensive Metabolic Panel; Future; Expected date: 04/09/2025  -     Lipid Panel; Future; Expected date: 04/09/2025       I hereby acknowledge that I am relying upon documentation authored by a medical student working under my supervision and further I hereby attest that I have verified the student documentation or findings by personally re-performing the physical exam and medical decision making activities of the Evaluation and Management service to be billed.  Kash Colin

## 2024-11-13 DIAGNOSIS — M85.80 OSTEOPENIA, UNSPECIFIED LOCATION: ICD-10-CM

## 2024-11-13 DIAGNOSIS — F51.01 PRIMARY INSOMNIA: ICD-10-CM

## 2024-11-13 RX ORDER — POTASSIUM CHLORIDE 600 MG/1
8 TABLET, FILM COATED, EXTENDED RELEASE ORAL
Qty: 90 TABLET | Refills: 1 | Status: SHIPPED | OUTPATIENT
Start: 2024-11-13

## 2024-11-13 RX ORDER — SERTRALINE HYDROCHLORIDE 100 MG/1
TABLET, FILM COATED ORAL
Qty: 90 TABLET | Refills: 3 | Status: SHIPPED | OUTPATIENT
Start: 2024-11-13

## 2024-11-13 RX ORDER — TRAZODONE HYDROCHLORIDE 100 MG/1
TABLET ORAL
Qty: 90 TABLET | Refills: 3 | Status: SHIPPED | OUTPATIENT
Start: 2024-11-13

## 2024-11-13 NOTE — TELEPHONE ENCOUNTER
Refill Routing Note   Medication(s) are not appropriate for processing by [facility] for the following reason(s):        Outside of protocol  Non-participating provider    [facility] action(s):  Route             Appointments  past 12m or future 3m with PCP    Date Provider   Last Visit   Visit date not found Arpita Hickey NP   Next Visit   Visit date not found Arpita Hickey, NP   ED visits in past 90 days: 0        Note composed:3:28 PM 11/13/2024

## 2024-11-13 NOTE — TELEPHONE ENCOUNTER
Refill Routing Note   Medication(s) are not appropriate for processing by [facility] for the following reason(s):        Non-participating provider    [facility] action(s):  Defer  Approve             Appointments  past 12m or future 3m with PCP    Date Provider   Last Visit   10/9/2024 Kash Colin MD   Next Visit   4/9/2025 Kash Colin MD   ED visits in past 90 days: 0        Note composed:3:27 PM 11/13/2024

## 2024-11-13 NOTE — TELEPHONE ENCOUNTER
No care due was identified.  [facility] Embedded Care Due Messages. Reference number: [id number].   11/13/2024 3:13:45 PM CST

## 2024-11-14 RX ORDER — RIVAROXABAN 15 MG/1
15 TABLET, FILM COATED ORAL
Qty: 90 TABLET | Refills: 3 | Status: SHIPPED | OUTPATIENT
Start: 2024-11-14

## 2024-11-14 RX ORDER — ALENDRONATE SODIUM TABLET 35 MG/1
TABLET ORAL
Qty: 12 TABLET | Refills: 3 | Status: SHIPPED | OUTPATIENT
Start: 2024-11-14

## 2024-12-06 NOTE — TELEPHONE ENCOUNTER
No care due was identified.  [facility] Embedded Care Due Messages. Reference number: [id number].   12/06/2024 2:23:40 PM CST

## 2024-12-08 NOTE — TELEPHONE ENCOUNTER
Refill Routing Note   Medication(s) are not appropriate for processing by [facility] for the following reason(s):        ED/Hospital Visit since last OV with provider    [facility] action(s):  Defer             Appointments  past 12m or future 3m with PCP    Date Provider   Last Visit   10/9/2024 Kash Colin MD   Next Visit   4/9/2025 Kash Colin MD   ED visits in past 90 days: 1        Note composed:8:48 PM 12/07/2024

## 2024-12-09 RX ORDER — POTASSIUM CHLORIDE 600 MG/1
8 TABLET, FILM COATED, EXTENDED RELEASE ORAL
Qty: 90 TABLET | Refills: 1 | OUTPATIENT
Start: 2024-12-09

## 2025-01-13 DIAGNOSIS — Z00.00 ENCOUNTER FOR MEDICARE ANNUAL WELLNESS EXAM: ICD-10-CM

## 2025-04-11 ENCOUNTER — OFFICE VISIT (OUTPATIENT)
Dept: PRIMARY CARE CLINIC | Facility: CLINIC | Age: 75
End: 2025-04-11
Payer: MEDICARE

## 2025-04-11 VITALS
SYSTOLIC BLOOD PRESSURE: 134 MMHG | HEART RATE: 80 BPM | WEIGHT: 258.19 LBS | DIASTOLIC BLOOD PRESSURE: 74 MMHG | TEMPERATURE: 97 F | OXYGEN SATURATION: 96 % | BODY MASS INDEX: 34.97 KG/M2 | HEIGHT: 72 IN | RESPIRATION RATE: 18 BRPM

## 2025-04-11 DIAGNOSIS — I10 ESSENTIAL HYPERTENSION, BENIGN: ICD-10-CM

## 2025-04-11 DIAGNOSIS — I69.851 HEMIPLEGIA AND HEMIPARESIS FOLLOWING OTHER CEREBROVASCULAR DISEASE AFFECTING RIGHT DOMINANT SIDE: ICD-10-CM

## 2025-04-11 DIAGNOSIS — E66.01 SEVERE OBESITY (BMI 35.0-39.9) WITH COMORBIDITY: ICD-10-CM

## 2025-04-11 DIAGNOSIS — E78.5 HYPERLIPIDEMIA, UNSPECIFIED HYPERLIPIDEMIA TYPE: Primary | ICD-10-CM

## 2025-04-11 DIAGNOSIS — I48.0 PAROXYSMAL ATRIAL FIBRILLATION: ICD-10-CM

## 2025-04-11 DIAGNOSIS — Z12.31 ENCOUNTER FOR SCREENING MAMMOGRAM FOR BREAST CANCER: ICD-10-CM

## 2025-04-11 DIAGNOSIS — I70.0 AORTIC ATHEROSCLEROSIS: ICD-10-CM

## 2025-04-11 DIAGNOSIS — N18.32 CHRONIC RENAL IMPAIRMENT, STAGE 3B: ICD-10-CM

## 2025-04-11 PROCEDURE — 99215 OFFICE O/P EST HI 40 MIN: CPT | Mod: PBBFAC,PN | Performed by: FAMILY MEDICINE

## 2025-04-11 PROCEDURE — 99999 PR PBB SHADOW E&M-EST. PATIENT-LVL V: CPT | Mod: PBBFAC,,, | Performed by: FAMILY MEDICINE

## 2025-04-11 RX ORDER — METOPROLOL SUCCINATE 100 MG/1
100 TABLET, EXTENDED RELEASE ORAL DAILY
Qty: 30 TABLET | Refills: 0 | Status: SHIPPED | OUTPATIENT
Start: 2025-04-11 | End: 2025-05-11

## 2025-04-11 RX ORDER — FUROSEMIDE 40 MG/1
40 TABLET ORAL DAILY PRN
Qty: 90 TABLET | Refills: 0 | Status: SHIPPED | OUTPATIENT
Start: 2025-04-11

## 2025-04-11 RX ORDER — METOPROLOL SUCCINATE 100 MG/1
100 TABLET, EXTENDED RELEASE ORAL DAILY
Qty: 90 TABLET | Refills: 3 | Status: SHIPPED | OUTPATIENT
Start: 2025-04-11

## 2025-04-11 NOTE — PROGRESS NOTES
Assessment:       1. Hyperlipidemia, unspecified hyperlipidemia type    2. Encounter for screening mammogram for breast cancer    3. 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 Chronic renal impairment, stage 3b    5. Aortic atherosclerosis    6. Severe obesity (BMI 35.0-39.9) with comorbidity    7. Essential hypertension, benign    8. 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  Dispense: 30 tablet; Refill: 0  -     metoprolol succinate (TOPROL-XL) 100 MG 24 hr tablet; Take 1 tablet (100 mg total) by mouth once daily.  Dispense: 90 tablet; Refill: 3  -     furosemide (LASIX) 40 MG tablet; Take 1 tablet (40 mg total) by mouth daily as needed (swelling).  Dispense: 90 tablet; Refill: 0    Paroxysmal atrial fibrillation  -     metoprolol succinate (TOPROL-XL) 100 MG 24 hr tablet; Take 1 tablet (100 mg total) by mouth once daily.  Dispense: 30 tablet; Refill: 0  -     metoprolol succinate (TOPROL-XL) 100 MG 24 hr tablet; Take 1 tablet (100 mg total) by mouth once daily.  Dispense:  90 tablet; Refill: 3      Assessment & Plan    - Assessed recent bout of diverticulitis, which required ER visit and antibiotic treatment.  - Reviewed history of a-fib and current anticoagulation therapy.  - Evaluated BP management, noting recent lapse in Toprol medication.  - Considered report of occasional foot swelling and previous use of furosemide.  - Noted neurologist assessment of tremors being under control.  - Acknowledged recent fall without injury.    DIVERTICULITIS:   Monitored the patient's recent bout of diverticulitis, which occurred two weeks ago and resulted in an ER visit due to severe pain affecting ambulation.   Noted the patient's previous episode of diverticulitis in the 1980s.   Reviewed colonoscopy results, which revealed the presence of colonic diverticula.   Evaluated CT scan results, which showed acute diverticulitis in the sigmoid colon with the presence of foreign material causing infection.   Assessed the follow-up CT scan W Contrast performed at the [facility] after a week of antibiotic therapy when symptoms worsened.   Acknowledged the severity of the recent diverticulitis episode compared to the previous one from the 1980s.   Confirmed completion of antibiotic course for the diverticulitis.    ATRIAL FIBRILLATION:   Inquired about recent issues with atrial fibrillation.   Confirmed continued use of Xarelto for atrial fibrillation management.   Noted ongoing care with cardiologist, Dr. Luan Whitaker, at the [facility] for cardiac condition management.    HYPERTENSION:   Refilled Toprol (metoprolol): 30-day supply to local [facility] pharmacy and 90-day supply to [facility].   Noted the patient has been without blood pressure medication (Toprol) for 2 weeks.   Acknowledged the need to refill the blood pressure medication.   Prescribed a 30-day supply of Toprol to be filled at [facility] and a 90-day supply to be sent to [facility].    TREMOR:   Monitored the patient's tremors,  which are reported to be well-controlled.    LOCALIZED EDEMA:   Refilled Lasix (furosemide): 90-day supply to [facility] for as-needed use.   Noted patient reports of occasional pedal edema.   Inquired about potential congestive heart failure.   Confirmed patient has been using leftover furosemide (Lasix) as needed for foot swelling.   Agreed to refill the prescription.   Prescribed a 90-day supply of Lasix to be sent to [facility].    FALL:   Noted one recent fall reported by the patient without injury.    LONG-TERM USE OF ANTICOAGULANTS:   Confirmed ongoing use of Xarelto, an anticoagulant.    HISTORY OF DIGESTIVE SYSTEM DISEASE:   Noted patient's history of diverticulitis in the 1980s and the presence of colonic diverticula identified during a colonoscopy.       Medication List with Changes/Refills   New Medications    METOPROLOL SUCCINATE (TOPROL-XL) 100 MG 24 HR TABLET    Take 1 tablet (100 mg total) by mouth once daily.   Current Medications    ALBUTEROL (PROVENTIL) 2.5 MG /3 ML (0.083 %) NEBULIZER SOLUTION    Take 3 mLs (2.5 mg total) by nebulization every 4 (four) hours as needed for Wheezing or Shortness of Breath. Rescue    ALBUTEROL (PROVENTIL/VENTOLIN HFA) 90 MCG/ACTUATION INHALER    Inhale 2 puffs into the lungs every 4 (four) hours as needed for Wheezing or Shortness of Breath. Rescue    ALENDRONATE (FOSAMAX) 35 MG TABLET    TAKE 1 TABLET EVERY 7 DAYS    ATORVASTATIN (LIPITOR) 20 MG TABLET    Take 1 tablet by mouth once daily.    CYCLOBENZAPRINE (FLEXERIL) 10 MG TABLET    TAKE 1 TABLET DAILY AS NEEDED FOR MUSCLE SPASMS    LIDOCAINE-PRILOCAINE (EMLA) CREAM    Apply topically 3 (three) times daily as needed.    LISINOPRIL 10 MG TABLET    Take 10 mg by mouth.    MONTELUKAST (SINGULAIR) 10 MG TABLET    Take 1 tablet (10 mg total) by mouth every evening.    POTASSIUM CHLORIDE (KLOR-CON 8) 8 MEQ TBSR    TAKE 1 TABLET DAILY    PRIMIDONE (MYSOLINE) 50 MG TAB    Take 50 mg by mouth every evening.     SERTRALINE (ZOLOFT) 100 MG TABLET    TAKE 1 TABLET DAILY    TRAMADOL (ULTRAM) 50 MG TABLET    Take 1 tablet (50 mg total) by mouth every 24 hours as needed for Pain.    TRAZODONE (DESYREL) 100 MG TABLET    TAKE 1 TABLET DAILY AT BEDTIME    TRIAMCINOLONE ACETONIDE 0.1% (KENALOG) 0.1 % CREAM    Apply 80 g topically daily as needed.    XARELTO 15 MG TAB    TAKE 1 TABLET DAILY   Changed and/or Refilled Medications    Modified Medication Previous Medication    FUROSEMIDE (LASIX) 40 MG TABLET furosemide (LASIX) 40 MG tablet       Take 1 tablet (40 mg total) by mouth daily as needed (swelling).    TAKE 1 TABLET BY MOUTH EVERY DAY    METOPROLOL SUCCINATE (TOPROL-XL) 100 MG 24 HR TABLET metoprolol succinate (TOPROL-XL) 100 MG 24 hr tablet       Take 1 tablet (100 mg total) by mouth once daily.    Take 100 mg by mouth once daily.   Discontinued Medications    AMOXICILLIN-CLAVULANATE 875-125MG (AUGMENTIN) 875-125 MG PER TABLET    Take 1 tablet by mouth 2 (two) times daily.    HYDROCODONE-ACETAMINOPHEN (NORCO) 5-325 MG PER TABLET    Take 1 tablet by mouth every 6 (six) hours as needed for Pain.    NAPROXEN (NAPROSYN) 500 MG TABLET    Take 1 tablet (500 mg total) by mouth 2 (two) times daily with meals.    POTASSIUM CHLORIDE (KLOR-CON) 10 MEQ TBSR    Take 1 tablet (10 mEq total) by mouth once daily.         Subjective:    Patient ID: Sveta العراقي is a 74 y.o. female.  Chief Complaint: Follow-up (6 month)    HPI  History of Present Illness    HPI:  Patient presents for a regular six-month checkup and to discuss a recent bout of diverticulitis. Patient had a bout of diverticulitis approximately 2 weeks ago, leading to an emergency room visit. She had severe pain that impaired her ability to walk. She completed a course of antibiotics for the condition. Patient had diverticulitis once before in the 1980s. During a previous colonoscopy, she was informed of having pouches in the colon. A recent CT scan revealed the presence of  nuts or similar material that had become infected in these pouches.    Patient initially sought care at [facility], where she was diagnosed with acute diverticulitis of the sigmoid colon. After about a week of taking antibiotics, she had a severe exacerbation of pain, impairing her ability to stand or drive. This led to another hospital visit at [facility], where she received a CT scan W Contrast.    Patient reports occasional swelling in her feet, for which she has been using leftover furosemide (Lasix) as needed. She has only 5 pills left.    Patient has been out of her blood pressure medication, Toprol, for a few weeks due to issues with prescription refills between her pharmacy and doctor's office.    Patient was evaluated by a neurologist who indicated that her tremors are well-controlled. She had one fall recently but without injury.    Patient denies recent issues with atrial fibrillation. Patient denies having congestive heart failure.    MEDICATIONS:  Patient is on Xarelto for atrial fibrillation. She has been out of Toprol, her blood pressure medication, for a few weeks. She is taking Lasix (furosemide) as needed for foot swelling, with about 5 pills remaining. Patient discontinued Toprol due to running out of medication and started taking Lasix occasionally for foot swelling, using leftover medication.    MEDICAL HISTORY:  Patient has a history of diverticulitis, with her first episode occurring in the 1980s and a recent episode 2 weeks ago. She also has a history of atrial fibrillation (A fib), hypertension, and tremors.    SOCIAL HISTORY:  Occupation: Delivers for [employer]      ROS:  Cardiovascular: -palpitations, +lower extremity edema  Gastrointestinal: +abdominal pain  Neurological: +tremors       Review of Systems    Objective:      Vitals:    04/11/25 1211   BP: 134/74   BP Location: Left arm   Patient Position: Sitting   Pulse: 80   Resp: 18   Temp: 97 °F (36.1 °C)   TempSrc:  Temporal   SpO2: 96%   Weight: 117.1 kg (258 lb 2.5 oz)   Height: 6' (1.829 m)     BP Readings from Last 5 Encounters:   04/11/25 134/74   03/21/25 (!) 106/54   11/25/24 137/69   10/09/24 138/78   08/27/24 129/71     Wt Readings from Last 5 Encounters:   04/11/25 117.1 kg (258 lb 2.5 oz)   03/21/25 114.8 kg (253 lb 1.4 oz)   11/25/24 113.4 kg (250 lb)   10/09/24 114.1 kg (251 lb 10.5 oz)   08/27/24 115.2 kg (254 lb 1.3 oz)     Physical Exam  Physical Exam    General: Well-developed. Well-nourished. No acute distress.  Eyes: EOMI. Sclerae anicteric.  HENT: Normocephalic. Atraumatic. Nares patent. Moist oral mucosa.  Cardiovascular: Regular rate. Regular rhythm. No murmurs. No rubs. No gallops. Normal S1, S2.  Respiratory: Normal respiratory effort. Clear to auscultation bilaterally. No rales. No rhonchi. No wheezing.  Musculoskeletal: No  obvious deformity.  Extremities: No lower extremity edema.  Neurological: Alert & oriented x3. No slurred speech. Normal gait.  Psychiatric: Normal mood. Normal affect. Good insight. Good judgment.  Skin: Warm. Dry. No rash.  IMAGING:  Patient underwent a CT Abdomen 2 weeks ago, which revealed acute diverticulitis with sigmoid colon involvement. Nuts or similar material were seen in the pouches. A follow-up CT Abdomen W Contrast was performed approximately 1 week after the initial CT at [facility].               Lab Results   Component Value Date    WBC 9.07 03/21/2025    HGB 12.2 03/21/2025    HCT 36.6 (L) 03/21/2025     03/21/2025    CHOL 168 04/09/2024    TRIG 53 04/09/2024    HDL 76 (H) 04/09/2024    ALT 10 03/26/2025    AST 16 03/26/2025     03/27/2025    K 4.3 03/27/2025     03/21/2025    CREATININE 0.94 03/27/2025    BUN 30.0 (H) 03/27/2025    CO2 23 (L) 03/27/2025    TSH 2.343 04/09/2024    INR 1.2 06/14/2024    HGBA1C 5.3 04/09/2024      This note was generated with the assistance of ambient listening technology. Verbal consent was obtained by  the patient and accompanying visitor(s) for the recording of patient appointment to facilitate this note. I attest to having reviewed and edited the generated note for accuracy, though some syntax or spelling errors may persist. Please contact the author of this note for any clarification.

## 2025-05-12 RX ORDER — POTASSIUM CHLORIDE 600 MG/1
8 TABLET, FILM COATED, EXTENDED RELEASE ORAL
Qty: 90 TABLET | Refills: 3 | Status: SHIPPED | OUTPATIENT
Start: 2025-05-12

## 2025-05-12 NOTE — TELEPHONE ENCOUNTER
Refill Decision Note   Sveta العراقي  is requesting a refill authorization.    Brief Assessment and Rationale for Refill:   Approve       Medication Therapy Plan:         Comments:     Note composed:5:03 PM 05/12/2025

## 2025-05-12 NOTE — TELEPHONE ENCOUNTER
No care due was identified.  [facility] Embedded Care Due Messages. Reference number: [id number].   5/12/2025 2:19:51 AM CDT

## 2025-05-13 DIAGNOSIS — J45.20 MILD INTERMITTENT ASTHMA WITHOUT COMPLICATION: ICD-10-CM

## 2025-05-13 RX ORDER — ALBUTEROL SULFATE 90 UG/1
INHALANT RESPIRATORY (INHALATION)
Qty: 25.5 G | Refills: 3 | Status: SHIPPED | OUTPATIENT
Start: 2025-05-13

## 2025-05-13 NOTE — TELEPHONE ENCOUNTER
Refill Decision Note   Sveta العراقي  is requesting a refill authorization.  Brief Assessment and Rationale for Refill:  Approve     Medication Therapy Plan:         Comments:     Note composed:3:31 PM 05/13/2025

## 2025-05-13 NOTE — TELEPHONE ENCOUNTER
No care due was identified.  [facility] Embedded Care Due Messages. Reference number: [id number].   5/13/2025 1:57:48 PM CDT

## 2025-06-03 ENCOUNTER — HOSPITAL ENCOUNTER (OUTPATIENT)
Dept: RADIOLOGY | Facility: HOSPITAL | Age: 75
Discharge: HOME | End: 2025-06-03
Attending: FAMILY MEDICINE
Payer: MEDICARE

## 2025-06-03 DIAGNOSIS — Z12.31 ENCOUNTER FOR SCREENING MAMMOGRAM FOR BREAST CANCER: ICD-10-CM

## 2025-06-03 PROCEDURE — 77063 BREAST TOMOSYNTHESIS BI: CPT | Mod: 26,,, | Performed by: RADIOLOGY

## 2025-06-03 PROCEDURE — 77063 BREAST TOMOSYNTHESIS BI: CPT | Mod: TC

## 2025-06-03 PROCEDURE — 77067 SCR MAMMO BI INCL CAD: CPT | Mod: 26,,, | Performed by: RADIOLOGY

## 2025-06-04 ENCOUNTER — RESULTS FOLLOW-UP (OUTPATIENT)
Dept: PRIMARY CARE CLINIC | Facility: CLINIC | Age: 75
End: 2025-06-04

## 2025-06-17 DIAGNOSIS — I10 ESSENTIAL HYPERTENSION, BENIGN: ICD-10-CM

## 2025-06-17 RX ORDER — FUROSEMIDE 40 MG/1
TABLET ORAL
Qty: 90 TABLET | Refills: 3 | Status: SHIPPED | OUTPATIENT
Start: 2025-06-17

## 2025-06-17 NOTE — TELEPHONE ENCOUNTER
No care due was identified.  [facility] Embedded Care Due Messages. Reference number: [id number].   6/17/2025 2:19:04 AM CDT

## 2025-06-17 NOTE — TELEPHONE ENCOUNTER
Refill Routing Note   Medication(s) are not appropriate for processing by [facility] for the following reason(s):        Outside of protocol    [facility] action(s):  Route        Medication Therapy Plan: prn use of diuretics      Appointments  past 12m or future 3m with PCP    Date Provider   Last Visit   4/11/2025 Kash Colin MD   Next Visit   10/13/2025 Kash Colin MD   ED visits in past 90 days: 1        Note composed:6:27 AM 06/17/2025

## 2025-06-30 DIAGNOSIS — J45.20 MILD INTERMITTENT ASTHMA WITHOUT COMPLICATION: ICD-10-CM

## 2025-06-30 RX ORDER — MONTELUKAST SODIUM 10 MG/1
10 TABLET ORAL NIGHTLY
Qty: 90 TABLET | Refills: 3 | Status: SHIPPED | OUTPATIENT
Start: 2025-06-30

## 2025-06-30 NOTE — TELEPHONE ENCOUNTER
No care due was identified.  [facility] Embedded Care Due Messages. Reference number: [id number].   6/30/2025 2:48:11 AM CDT

## 2025-06-30 NOTE — TELEPHONE ENCOUNTER
Refill Decision Note   Sveta العراقي  is requesting a refill authorization.  Brief Assessment and Rationale for Refill:  Approve     Medication Therapy Plan:         Comments:     Note composed:2:20 PM 06/30/2025

## 2025-08-31 PROBLEM — I67.9 CEREBRAL HYPOPERFUSION: Status: ACTIVE | Noted: 2025-08-31

## 2025-08-31 PROBLEM — F32.A DEPRESSION: Status: ACTIVE | Noted: 2025-08-31

## 2025-08-31 PROBLEM — R00.2 PALPITATIONS: Status: RESOLVED | Noted: 2025-08-31 | Resolved: 2025-08-31

## 2025-08-31 PROBLEM — R00.2 PALPITATIONS: Status: ACTIVE | Noted: 2025-08-31

## 2025-08-31 PROBLEM — R55 PRE-SYNCOPE: Status: ACTIVE | Noted: 2025-08-31

## 2025-09-02 ENCOUNTER — TELEPHONE (OUTPATIENT)
Dept: PRIMARY CARE CLINIC | Facility: CLINIC | Age: 75
End: 2025-09-02
Payer: MEDICARE

## 2025-09-02 PROBLEM — R55 PRE-SYNCOPE: Status: RESOLVED | Noted: 2025-08-31 | Resolved: 2025-09-02

## 2025-09-05 ENCOUNTER — PATIENT OUTREACH (OUTPATIENT)
Dept: ADMINISTRATIVE | Facility: CLINIC | Age: 75
End: 2025-09-05
Payer: MEDICARE

## (undated) DEVICE — GLOVE SENSICARE PI GRN 8.5

## (undated) DEVICE — MANIFOLD 4 PORT

## (undated) DEVICE — Device

## (undated) DEVICE — STRAP OR TABLE 5IN X 72IN

## (undated) DEVICE — SLEEVE SCD EXPRESS KNEE MEDIUM

## (undated) DEVICE — GOWN POLY REINF BRTH SLV XL

## (undated) DEVICE — PAD GROUNDING DISPER ELECTRODE

## (undated) DEVICE — CANNULA DRY DOC 7 X 85

## (undated) DEVICE — WRAP SHLDR HIP ACCU THRM PACK

## (undated) DEVICE — APPLICATOR CHLORAPREP ORN 26ML

## (undated) DEVICE — COVER SURG TABLE BACK 44X76IN

## (undated) DEVICE — BLADE SURG CARBON STEEL SZ11

## (undated) DEVICE — MAT SURGICAL ECOSUCTIONER

## (undated) DEVICE — Y-KNOT PRO RC ANCHOR, TWO RIBBONS
Type: IMPLANTABLE DEVICE | Site: SHOULDER | Status: NON-FUNCTIONAL
Brand: Y-KNOT

## (undated) DEVICE — DRAPE STERI U-SHAPED 47X51IN

## (undated) DEVICE — BURR OVAL CUTTING 6 MM

## (undated) DEVICE — TUBE SET INFLOW/OUTFLOW

## (undated) DEVICE — DRAPE THREE-QTR REINF 53X77IN

## (undated) DEVICE — DRESSING XEROFORM NONADH 1X8IN

## (undated) DEVICE — DRAPE INVISISHIELD TOWEL SMALL

## (undated) DEVICE — GOWN POLY REINF X-LONG XL

## (undated) DEVICE — SOL NACL IRR 3000ML

## (undated) DEVICE — MAT QUICK 40X30 FLOOR FLUID LF

## (undated) DEVICE — SPONGE BULKEE II ABSRB 6X6.75

## (undated) DEVICE — SYR 30CC LUER LOCK

## (undated) DEVICE — TOWEL OR DISP STRL BLUE 4/PK

## (undated) DEVICE — DRAPE STERI INSTRUMENT 1018

## (undated) DEVICE — PACK SET UP 190 OMC-NS

## (undated) DEVICE — NDL SPINAL 18GX3.5 SPINOCAN

## (undated) DEVICE — PROBE ARTHO ENERGY 90 DEG

## (undated) DEVICE — ALCOHOL 70% ANTISEPTIC ISO 4OZ

## (undated) DEVICE — GLOVE SENSICARE PI ALOE 8

## (undated) DEVICE — TUBING SUC UNIV W/CONN 12FT

## (undated) DEVICE — PAD ABDOMINAL STERILE 8X10IN

## (undated) DEVICE — PACK SIRUS BASIC V SURG STRL

## (undated) DEVICE — KIT ASSISTARM SH KN STRL

## (undated) DEVICE — SUT ETHILON 3-0 FS-1 30

## (undated) DEVICE — TAPE MEDIPORE 4IN X 2YDS

## (undated) DEVICE — SHAVER ULTRAFFR 4.2MM

## (undated) DEVICE — NDL SPECTRUM AUTOPASS

## (undated) DEVICE — DRAPE ORTH SPLIT 77X108IN

## (undated) DEVICE — GLOVE SENSICARE PI GRN 8

## (undated) DEVICE — PACK SHOULDER DRAPE POUCH

## (undated) DEVICE — DRAPE INCISE IOBAN 2 23X17IN

## (undated) DEVICE — KIT NERVE BLOCK PREP BAPTIST

## (undated) DEVICE — CONNECTOR TUBING STR 5 IN 1

## (undated) DEVICE — CANNULA RF 18G 100MM 10MM TIP